# Patient Record
Sex: MALE | Race: OTHER | HISPANIC OR LATINO
[De-identification: names, ages, dates, MRNs, and addresses within clinical notes are randomized per-mention and may not be internally consistent; named-entity substitution may affect disease eponyms.]

---

## 2019-03-27 ENCOUNTER — RESULT REVIEW (OUTPATIENT)
Age: 54
End: 2019-03-27

## 2019-04-23 PROBLEM — Z00.00 ENCOUNTER FOR PREVENTIVE HEALTH EXAMINATION: Status: ACTIVE | Noted: 2019-04-23

## 2019-04-29 ENCOUNTER — APPOINTMENT (OUTPATIENT)
Dept: UROLOGY | Facility: CLINIC | Age: 54
End: 2019-04-29
Payer: COMMERCIAL

## 2019-04-29 VITALS
SYSTOLIC BLOOD PRESSURE: 151 MMHG | HEART RATE: 89 BPM | BODY MASS INDEX: 34.07 KG/M2 | HEIGHT: 69 IN | RESPIRATION RATE: 14 BRPM | DIASTOLIC BLOOD PRESSURE: 102 MMHG | WEIGHT: 230 LBS | OXYGEN SATURATION: 98 %

## 2019-04-29 DIAGNOSIS — R31.29 OTHER MICROSCOPIC HEMATURIA: ICD-10-CM

## 2019-04-29 DIAGNOSIS — N13.8 BENIGN PROSTATIC HYPERPLASIA WITH LOWER URINARY TRACT SYMPMS: ICD-10-CM

## 2019-04-29 DIAGNOSIS — N40.1 BENIGN PROSTATIC HYPERPLASIA WITH LOWER URINARY TRACT SYMPMS: ICD-10-CM

## 2019-04-29 DIAGNOSIS — E78.00 PURE HYPERCHOLESTEROLEMIA, UNSPECIFIED: ICD-10-CM

## 2019-04-29 DIAGNOSIS — F17.200 NICOTINE DEPENDENCE, UNSPECIFIED, UNCOMPLICATED: ICD-10-CM

## 2019-04-29 PROCEDURE — 99204 OFFICE O/P NEW MOD 45 MIN: CPT | Mod: 25

## 2019-04-29 PROCEDURE — 51798 US URINE CAPACITY MEASURE: CPT

## 2019-04-29 RX ORDER — ATORVASTATIN CALCIUM 40 MG/1
40 TABLET, FILM COATED ORAL
Refills: 0 | Status: ACTIVE | COMMUNITY

## 2019-04-29 NOTE — PHYSICAL EXAM
[General Appearance - Well Developed] : well developed [Normal Appearance] : normal appearance [General Appearance - In No Acute Distress] : no acute distress [FreeTextEntry1] : normal peripheral circulation  [] : no respiratory distress [Abdomen Soft] : soft [Abdomen Tenderness] : non-tender [Abdomen Mass (___ Cm)] : no abdominal mass palpated [Costovertebral Angle Tenderness] : no ~M costovertebral angle tenderness [Urethral Meatus] : meatus normal [Penis Abnormality] : normal uncircumcised penis [Scrotum] : the scrotum was normal [Epididymis] : the epididymides were normal [Testes Tenderness] : no tenderness of the testes [Testes Mass (___cm)] : there were no testicular masses [Prostate Tenderness] : the prostate was not tender [No Prostate Nodules] : no prostate nodules [Prostate Size ___ gm] : prostate size [unfilled] gm [Normal Station and Gait] : the gait and station were normal for the patient's age [Skin Color & Pigmentation] : normal skin color and pigmentation [No Focal Deficits] : no focal deficits [Oriented To Time, Place, And Person] : oriented to person, place, and time [No Palpable Adenopathy] : no palpable adenopathy

## 2019-04-29 NOTE — REVIEW OF SYSTEMS
[both] : pain during and after intercourse [denies] : denies pain with orgasm [base] : pain in base of penis [Blood in urine that you can see] : blood visible in urine [Told you have blood in urine on a urine test] : told blood was present in a urine test [Negative] : Heme/Lymph

## 2019-04-29 NOTE — LETTER BODY
[Dear  ___] : Dear  [unfilled], [Consult Letter:] : I had the pleasure of evaluating your patient, [unfilled]. [( Thank you for referring [unfilled] for consultation for _____ )] : Thank you for referring [unfilled] for consultation for [unfilled] [Please see my note below.] : Please see my note below. [Consult Closing:] : Thank you very much for allowing me to participate in the care of this patient.  If you have any questions, please do not hesitate to contact me. [Sincerely,] : Sincerely, [FreeTextEntry3] : Victoriano Lara MD\par  of Urology\par [facility]\par \par Offices:\par [facility] @\par [address], [place] [address]\par and\par [address], [place] [address], [address]\par and\par [address] [address]\par \par TEL: [phone]\par FAX: [phone]

## 2019-04-29 NOTE — HISTORY OF PRESENT ILLNESS
[FreeTextEntry1] : 52 yo male presents for Microhematuria. \par Recently had urine test and was told has microscopic hematuria. \par Denies gross hematuria, no history of kidney stones or recurrent urinary tract infections. \par Smoker- 0.5- 1 PPD for 15 years. \par Occupational exposure- no. \par Reports reasonable stream, urinates every few hours or so during the day. No nocturia.\par Denies hesitancy, straining, intermittency, urgency, incontinence, sense of incomplete emptying.\par Denies dysuria, lower abdominal or flank pain, fever, chills or rigors.\par Normal erections and libido. \par No family history of Prostate cancer. \par \par

## 2019-04-29 NOTE — ASSESSMENT
[FreeTextEntry1] : Reviewed outside records.\par \par \par Benign Prostatic Hyperplasia:\par No bothersome lower urinary tract symptoms and minimal post void residual. \par \par Microhematuria:\par Discussed the differential diagnosis of hematuria including benign and malignant pathology- including but not limited to nephrolithiasis, bladder stone, urinary tract infection, glomerular disease, renal cancer, bladder cancer, prostate cancer. We also discussed the chance that workup will not reveal a source for the bleeding. The patient understands that the hematuria could be from an upper tract (kidney or ureter) or lower tract (bladder, urethra, prostate) and that workup includes imaging and direct visualization of all of these.\par \par Will proceed with work up with Urinalysis with microscopy, Urine culture, Urine cytology, CT Urogram and Cystoscopy. \par \par Return to office after CT scan.

## 2019-04-30 LAB
APPEARANCE: CLEAR
BACTERIA: NEGATIVE
BILIRUBIN URINE: NEGATIVE
BLOOD URINE: NEGATIVE
COLOR: NORMAL
GLUCOSE QUALITATIVE U: NEGATIVE
HYALINE CASTS: 0 /LPF
KETONES URINE: NEGATIVE
LEUKOCYTE ESTERASE URINE: NEGATIVE
MICROSCOPIC-UA: NORMAL
NITRITE URINE: NEGATIVE
PH URINE: 6.5
PROTEIN URINE: NEGATIVE
RED BLOOD CELLS URINE: 3 /HPF
SPECIFIC GRAVITY URINE: 1.01
SQUAMOUS EPITHELIAL CELLS: 0 /HPF
UROBILINOGEN URINE: NORMAL
WHITE BLOOD CELLS URINE: 0 /HPF

## 2019-05-01 LAB
BACTERIA UR CULT: NORMAL
URINE CYTOLOGY: NORMAL

## 2020-10-30 PROBLEM — Z00.00 ENCOUNTER FOR PREVENTIVE HEALTH EXAMINATION: Noted: 2020-10-30

## 2021-01-07 ENCOUNTER — NON-APPOINTMENT (OUTPATIENT)
Age: 56
End: 2021-01-07

## 2021-01-07 ENCOUNTER — APPOINTMENT (OUTPATIENT)
Dept: ELECTROPHYSIOLOGY | Facility: CLINIC | Age: 56
End: 2021-01-07
Payer: COMMERCIAL

## 2021-01-07 VITALS
BODY MASS INDEX: 34.8 KG/M2 | HEART RATE: 106 BPM | SYSTOLIC BLOOD PRESSURE: 146 MMHG | DIASTOLIC BLOOD PRESSURE: 78 MMHG | OXYGEN SATURATION: 97 % | HEIGHT: 69 IN | WEIGHT: 235 LBS | TEMPERATURE: 97.1 F

## 2021-01-07 DIAGNOSIS — I47.2 VENTRICULAR TACHYCARDIA: ICD-10-CM

## 2021-01-07 PROCEDURE — 99072 ADDL SUPL MATRL&STAF TM PHE: CPT

## 2021-01-07 PROCEDURE — 93000 ELECTROCARDIOGRAM COMPLETE: CPT

## 2021-01-07 PROCEDURE — 99244 OFF/OP CNSLTJ NEW/EST MOD 40: CPT

## 2021-01-07 RX ORDER — METFORMIN HYDROCHLORIDE 500 MG/1
500 TABLET, COATED ORAL
Qty: 60 | Refills: 0 | Status: ACTIVE | COMMUNITY

## 2021-01-07 NOTE — REVIEW OF SYSTEMS
[Dizziness] : no dizziness [Convulsions] : no convulsions [Confusion] : no confusion was observed [Anxiety] : no anxiety [Easy Bleeding] : no tendency for easy bleeding [Easy Bruising] : no tendency for easy bruising [Negative] : Integumentary

## 2021-01-07 NOTE — DISCUSSION/SUMMARY
[FreeTextEntry1] : 55 year old gentleman with history of DM, HLD, obesity presenting for evaluation of NSVT. He has been generally asymptomatic, and noted to have sinus tachycardia, and brief NSVT during Holter monitoring. \par In the absence of structural heart disease or CAD, would treat the asymptomatic NSVT conservatively, but beta blockade may be useful for arrhythmia suppression, particularly in light of his sinus tachycardia. \par Will need to follow-up TTE and stress test (from [facility]) to confirm absence of CAD/structural heart disease. \par In addition, we discussed the importance of treatment of sleep apnea, which is likely present. He will need follow-up for his recent sleep study. \par -start Toprol 50gm qd, adjust as tolerated. \par -f/u TTE, stress test from [facility].\par -f/u sleep study\par -further cardiology follow-up with Dr. Guthrie, and EP follow-up as needed. \par

## 2021-01-07 NOTE — PHYSICAL EXAM
[General Appearance - Well Developed] : well developed [Well Groomed] : well groomed [General Appearance - Well Nourished] : well nourished [General Appearance - In No Acute Distress] : no acute distress [FreeTextEntry1] : overweight [Normal Conjunctiva] : the conjunctiva exhibited no abnormalities [Normal Oral Mucosa] : normal oral mucosa [Normal Jugular Venous V Waves Present] : normal jugular venous V waves present [Heart Rate And Rhythm] : heart rate and rhythm were normal [Heart Sounds] : normal S1 and S2 [Murmurs] : no murmurs present [Edema] : no peripheral edema present [] : no respiratory distress [Respiration, Rhythm And Depth] : normal respiratory rhythm and effort [Auscultation Breath Sounds / Voice Sounds] : lungs were clear to auscultation bilaterally [Abdomen Soft] : soft [Abdomen Tenderness] : non-tender [Abnormal Walk] : normal gait [Nail Clubbing] : no clubbing of the fingernails [Cyanosis, Localized] : no localized cyanosis [Oriented To Time, Place, And Person] : oriented to person, place, and time [No Anxiety] : not feeling anxious

## 2021-01-07 NOTE — HISTORY OF PRESENT ILLNESS
[FreeTextEntry1] : 55 year old gentleman with history of DM, HLD, obesity presenting for evaluation of NSVT. \par He has been noted to have sinus tachycardia on evaluation but has denied palpitation. He feels generally well and also denies lightheadedness, chest pain, syncope or dyspnea.\par Holter monitor performed 10/13/20 revealed sinus rhythm with average HR 93 bpm (range  bpm) with rare ventricular ectopy, and 1 run of NSVT lasting 4 beats at 171 bpm. \par A TTE was performed, but results are not currently available. \par He does report episodes of apnea and snoring at night, he recently had a sleep study but does not know the results. \par ECG reveals sinus tachycardia at 107 bpm, with normal conduction intervals\par \par Current meds include metformin, Chantix, Flomax\par

## 2021-05-12 ENCOUNTER — TRANSCRIPTION ENCOUNTER (OUTPATIENT)
Age: 56
End: 2021-05-12

## 2021-06-03 ENCOUNTER — TRANSCRIPTION ENCOUNTER (OUTPATIENT)
Age: 56
End: 2021-06-03

## 2021-09-21 DIAGNOSIS — Z01.818 ENCOUNTER FOR OTHER PREPROCEDURAL EXAMINATION: ICD-10-CM

## 2021-09-21 PROBLEM — Z00.00 ENCOUNTER FOR PREVENTIVE HEALTH EXAMINATION: Noted: 2021-09-21

## 2021-09-24 ENCOUNTER — APPOINTMENT (OUTPATIENT)
Dept: DISASTER EMERGENCY | Facility: CLINIC | Age: 56
End: 2021-09-24

## 2021-09-26 ENCOUNTER — TRANSCRIPTION ENCOUNTER (OUTPATIENT)
Age: 56
End: 2021-09-26

## 2021-09-26 LAB — SARS-COV-2 N GENE NPH QL NAA+PROBE: NOT DETECTED

## 2021-09-27 ENCOUNTER — OUTPATIENT (OUTPATIENT)
Dept: OUTPATIENT SERVICES | Facility: HOSPITAL | Age: 56
LOS: 1 days | End: 2021-09-27
Payer: COMMERCIAL

## 2021-09-27 DIAGNOSIS — M54.16 RADICULOPATHY, LUMBAR REGION: ICD-10-CM

## 2021-09-27 LAB — GLUCOSE BLDC GLUCOMTR-MCNC: 194 MG/DL — HIGH (ref 70–99)

## 2021-09-27 PROCEDURE — 64484 NJX AA&/STRD TFRM EPI L/S EA: CPT

## 2021-09-27 PROCEDURE — 76000 FLUOROSCOPY <1 HR PHYS/QHP: CPT

## 2021-09-27 PROCEDURE — 82962 GLUCOSE BLOOD TEST: CPT

## 2021-09-27 PROCEDURE — 64483 NJX AA&/STRD TFRM EPI L/S 1: CPT | Mod: LT

## 2021-10-22 ENCOUNTER — APPOINTMENT (OUTPATIENT)
Dept: DISASTER EMERGENCY | Facility: CLINIC | Age: 56
End: 2021-10-22

## 2021-10-23 LAB — SARS-COV-2 N GENE NPH QL NAA+PROBE: NOT DETECTED

## 2021-10-24 ENCOUNTER — TRANSCRIPTION ENCOUNTER (OUTPATIENT)
Age: 56
End: 2021-10-24

## 2021-10-25 ENCOUNTER — OUTPATIENT (OUTPATIENT)
Dept: OUTPATIENT SERVICES | Facility: HOSPITAL | Age: 56
LOS: 1 days | End: 2021-10-25
Payer: MEDICAID

## 2021-10-25 DIAGNOSIS — M54.16 RADICULOPATHY, LUMBAR REGION: ICD-10-CM

## 2021-10-25 LAB — GLUCOSE BLDC GLUCOMTR-MCNC: 187 MG/DL — HIGH (ref 70–99)

## 2021-10-25 PROCEDURE — 82962 GLUCOSE BLOOD TEST: CPT

## 2021-10-25 PROCEDURE — 76000 FLUOROSCOPY <1 HR PHYS/QHP: CPT

## 2021-10-25 PROCEDURE — 64484 NJX AA&/STRD TFRM EPI L/S EA: CPT

## 2021-10-25 PROCEDURE — 64483 NJX AA&/STRD TFRM EPI L/S 1: CPT | Mod: LT

## 2022-12-06 ENCOUNTER — APPOINTMENT (OUTPATIENT)
Dept: NEUROLOGY | Facility: CLINIC | Age: 57
End: 2022-12-06

## 2022-12-06 VITALS
WEIGHT: 235 LBS | HEIGHT: 69 IN | DIASTOLIC BLOOD PRESSURE: 98 MMHG | SYSTOLIC BLOOD PRESSURE: 150 MMHG | BODY MASS INDEX: 34.8 KG/M2

## 2022-12-06 DIAGNOSIS — Z86.39 PERSONAL HISTORY OF OTHER ENDOCRINE, NUTRITIONAL AND METABOLIC DISEASE: ICD-10-CM

## 2022-12-06 DIAGNOSIS — Z86.79 PERSONAL HISTORY OF OTHER DISEASES OF THE CIRCULATORY SYSTEM: ICD-10-CM

## 2022-12-06 DIAGNOSIS — E11.9 TYPE 2 DIABETES MELLITUS W/OUT COMPLICATIONS: ICD-10-CM

## 2022-12-06 PROCEDURE — 99204 OFFICE O/P NEW MOD 45 MIN: CPT

## 2022-12-06 RX ORDER — EMPAGLIFLOZIN 25 MG/1
25 TABLET, FILM COATED ORAL
Qty: 90 | Refills: 0 | Status: ACTIVE | COMMUNITY
Start: 2022-09-19

## 2022-12-06 RX ORDER — METFORMIN HYDROCHLORIDE 500 MG/1
500 TABLET, COATED ORAL
Refills: 0 | Status: COMPLETED | COMMUNITY
End: 2022-12-06

## 2022-12-06 RX ORDER — TAMSULOSIN HYDROCHLORIDE 0.4 MG/1
0.4 CAPSULE ORAL
Qty: 90 | Refills: 0 | Status: ACTIVE | COMMUNITY
Start: 2022-09-19

## 2022-12-06 RX ORDER — LOSARTAN POTASSIUM 25 MG/1
25 TABLET, FILM COATED ORAL
Qty: 90 | Refills: 0 | Status: ACTIVE | COMMUNITY
Start: 2022-09-19

## 2022-12-06 NOTE — PHYSICAL EXAM
[General Appearance - Alert] : alert [General Appearance - In No Acute Distress] : in no acute distress [Oriented To Time, Place, And Person] : oriented to person, place, and time [Affect] : the affect was normal [Memory Recent] : recent memory was not impaired [Memory Remote] : remote memory was not impaired [Cranial Nerves Optic (II)] : visual acuity intact bilaterally,  visual fields full to confrontation, pupils equal round and reactive to light [Cranial Nerves Oculomotor (III)] : extraocular motion intact [Cranial Nerves Trigeminal (V)] : facial sensation intact symmetrically [Cranial Nerves Facial (VII)] : face symmetrical [Cranial Nerves Vestibulocochlear (VIII)] : hearing was intact bilaterally [Cranial Nerves Glossopharyngeal (IX)] : tongue and palate midline [Cranial Nerves Accessory (XI - Cranial And Spinal)] : head turning and shoulder shrug symmetric [Cranial Nerves Hypoglossal (XII)] : there was no tongue deviation with protrusion [Sensation Vibration Decrease] : vibration was intact [Abnormal Walk] : normal gait [2+] : Ankle jerk left 2+ [Optic Disc Abnormality] : the optic disc were normal in size and color [Dysarthria] : no dysarthria [Aphasia] : no dysphasia/aphasia [Romberg's Sign] : Romberg's sign was negtive [Coordination - Dysmetria Impaired Finger-to-Nose Bilateral] : not present [Plantar Reflex Right Only] : normal on the right [Plantar Reflex Left Only] : normal on the left [FreeTextEntry6] : There is decreased tone in the right arm throughout.

## 2022-12-06 NOTE — CONSULT LETTER
[Dear  ___] : Dear  [unfilled], [Courtesy Letter:] : I had the pleasure of seeing your patient, [unfilled], in my office today. [Please see my note below.] : Please see my note below. [Consult Closing:] : Thank you very much for allowing me to participate in the care of this patient.  If you have any questions, please do not hesitate to contact me. [Sincerely,] : Sincerely, [DrAnna  ___] : Dr. STEVENS [FreeTextEntry3] : Luis Miller MD.

## 2022-12-06 NOTE — DATA REVIEWED
[de-identified] : X-ray of the right shoulder was performed on 11/28/2022 at [facility]\par The study demonstrated a fracture of the right greater tuberosity.

## 2022-12-06 NOTE — HISTORY OF PRESENT ILLNESS
[FreeTextEntry1] : I saw this patient in the office today.\par \par On 11/22/2022 he fell and injured his right shoulder.\par He went to [facility] where he was told it was dislocated and had a manipulative procedure.\par The pain increased and he ultimately went to the emergency room at [facility].\par X-ray demonstrated fracture and he was referred to an orthopedist.\par He reports that he has had inability to move the right arm since the fall.\par His orthopedist referred him here for evaluation for the nerve damage prior to any surgery.

## 2022-12-06 NOTE — ASSESSMENT
[FreeTextEntry1] : This is a 56 year-old man who sustained an injury to the right shoulder.\par He has had essentially no movement in the right arm since the injury.\par \par Findings to suggest injury to the brachial plexus.\par I will obtain EMG and nerve conduction studies to assess the right upper extremity further.\par If he requires surgery, I would recommend that it be performed without waiting for the results of the EMG.\par \par He will need aggressive physical therapy after the surgery.\par \par Given the degree of weakness, the overall prognosis for motor recovery is guarded.\par \par I will see him back after the EMG testing.

## 2023-01-09 ENCOUNTER — APPOINTMENT (OUTPATIENT)
Dept: NEUROLOGY | Facility: CLINIC | Age: 58
End: 2023-01-09
Payer: MEDICAID

## 2023-01-09 PROCEDURE — 95886 MUSC TEST DONE W/N TEST COMP: CPT

## 2023-01-09 PROCEDURE — 95909 NRV CNDJ TST 5-6 STUDIES: CPT

## 2023-01-10 ENCOUNTER — NON-APPOINTMENT (OUTPATIENT)
Age: 58
End: 2023-01-10

## 2023-02-15 ENCOUNTER — APPOINTMENT (OUTPATIENT)
Dept: NEUROSURGERY | Facility: CLINIC | Age: 58
End: 2023-02-15
Payer: MEDICAID

## 2023-02-15 VITALS
OXYGEN SATURATION: 97 % | WEIGHT: 230 LBS | BODY MASS INDEX: 34.07 KG/M2 | TEMPERATURE: 98.6 F | HEIGHT: 69 IN | SYSTOLIC BLOOD PRESSURE: 149 MMHG | DIASTOLIC BLOOD PRESSURE: 84 MMHG | HEART RATE: 119 BPM

## 2023-02-15 DIAGNOSIS — G54.0 BRACHIAL PLEXUS DISORDERS: ICD-10-CM

## 2023-02-15 PROCEDURE — 99204 OFFICE O/P NEW MOD 45 MIN: CPT

## 2023-02-16 PROBLEM — G54.0 BRACHIAL PLEXOPATHY: Status: ACTIVE | Noted: 2022-12-06

## 2023-02-16 RX ORDER — GABAPENTIN 300 MG/1
300 CAPSULE ORAL 3 TIMES DAILY
Qty: 90 | Refills: 2 | Status: ACTIVE | COMMUNITY
Start: 2023-02-16 | End: 1900-01-01

## 2023-02-16 NOTE — DATA REVIEWED
[de-identified] : EMG: widespread denervation of the right arm consistent with a diffuse brachial plexopathy

## 2023-02-16 NOTE — HISTORY OF PRESENT ILLNESS
[< 3 months] : less than 3 months [FreeTextEntry1] : right arm pain and swelling [de-identified] : JAVIER SIMS is a 57 year old male who presents for neurosurgical evaluation of right arm pain and swelling. He presents with his right arm in a sling. On 11/22/2022 he fell and injured his right shoulder. He went to [facility] where he was told it was dislocated and had a manipulative procedure. The pain increased and he went to the emergency room at [facility]. X-ray demonstrated fracture and he was referred to an orthopedist. His orthopedist referred him to Neurology for the nerve damage prior to any surgery. He reports that he has had inability to move the right arm since the fall, he has done PT with minimal recovery of movement to right arm. Right hand started swelling this past week. He is in constant pain and is having difficulty sleeping. He is not taking any medications for pain. \par

## 2023-02-16 NOTE — ASSESSMENT
[FreeTextEntry1] : Patient with above history and no imaging brought to office. EMG showed widespread denervation of the right arm consistent with a diffuse brachial plexopathy. Referred to Dr. Krishnamurthy for consultation. Gabapentin sent to pharmacy, advised to follow up with pain management. \par \par \par Plan:\par f/u with specialist\par f/u PRN\par Patient knows to call the office if there are any new or worsening symptoms. \par All questions and concerns answered and addressed in detail to patient's complete satisfaction. Patient verbalized understanding and agreed to plan.\par \par

## 2023-02-16 NOTE — PHYSICAL EXAM
[General Appearance - Alert] : alert [Oriented To Time, Place, And Person] : oriented to person, place, and time [Impaired Insight] : insight and judgment were intact [Affect] : the affect was normal [No Visual Abnormalities] : no visible abnormailities [No Tenderness to Palpation] : no spine tenderness on palpation [Normal] : normal [Sclera] : the sclera and conjunctiva were normal [Neck Appearance] : the appearance of the neck was normal [] : no respiratory distress [Involuntary Movements] : no involuntary movements were seen [FreeTextEntry6] : R [FreeTextEntry7] : decreased light touch and pin sensation through right upper extremity  [FreeTextEntry1] : decreased strength right arm 2/5, pitting edema right hand, arm in sling

## 2023-05-09 ENCOUNTER — APPOINTMENT (OUTPATIENT)
Dept: NEUROLOGY | Facility: CLINIC | Age: 58
End: 2023-05-09

## 2024-02-11 ENCOUNTER — NON-APPOINTMENT (OUTPATIENT)
Age: 59
End: 2024-02-11

## 2024-02-12 ENCOUNTER — APPOINTMENT (OUTPATIENT)
Dept: OTOLARYNGOLOGY | Facility: CLINIC | Age: 59
End: 2024-02-12
Payer: MEDICAID

## 2024-02-12 VITALS
OXYGEN SATURATION: 96 % | BODY MASS INDEX: 31.4 KG/M2 | HEART RATE: 108 BPM | HEIGHT: 69 IN | SYSTOLIC BLOOD PRESSURE: 107 MMHG | WEIGHT: 212 LBS | RESPIRATION RATE: 16 BRPM | DIASTOLIC BLOOD PRESSURE: 74 MMHG

## 2024-02-12 DIAGNOSIS — Z87.898 PERSONAL HISTORY OF OTHER SPECIFIED CONDITIONS: ICD-10-CM

## 2024-02-12 DIAGNOSIS — Z86.718 PERSONAL HISTORY OF OTHER VENOUS THROMBOSIS AND EMBOLISM: ICD-10-CM

## 2024-02-12 PROCEDURE — 31231 NASAL ENDOSCOPY DX: CPT | Mod: 52

## 2024-02-12 PROCEDURE — 99204 OFFICE O/P NEW MOD 45 MIN: CPT | Mod: 25

## 2024-02-12 RX ORDER — POTASSIUM CHLORIDE 10 MEQ
10 CAPSULE, EXTENDED RELEASE ORAL
Refills: 0 | Status: ACTIVE | COMMUNITY

## 2024-02-12 RX ORDER — METOPROLOL SUCCINATE 50 MG/1
50 TABLET, EXTENDED RELEASE ORAL DAILY
Qty: 30 | Refills: 2 | Status: COMPLETED | COMMUNITY
Start: 2021-01-07 | End: 2024-02-12

## 2024-02-12 RX ORDER — HYDROCHLOROTHIAZIDE 12.5 MG/1
12.5 TABLET ORAL
Refills: 0 | Status: COMPLETED | COMMUNITY
End: 2024-02-12

## 2024-02-12 RX ORDER — FUROSEMIDE 40 MG/1
40 TABLET ORAL
Refills: 0 | Status: ACTIVE | COMMUNITY

## 2024-02-14 ENCOUNTER — NON-APPOINTMENT (OUTPATIENT)
Age: 59
End: 2024-02-14

## 2024-02-15 ENCOUNTER — OUTPATIENT (OUTPATIENT)
Dept: OUTPATIENT SERVICES | Facility: HOSPITAL | Age: 59
LOS: 1 days | Discharge: HOME | End: 2024-02-15
Payer: MEDICAID

## 2024-02-15 ENCOUNTER — OUTPATIENT (OUTPATIENT)
Dept: OUTPATIENT SERVICES | Facility: HOSPITAL | Age: 59
LOS: 1 days | Discharge: HOME | End: 2024-02-15

## 2024-02-15 DIAGNOSIS — C11.9 MALIGNANT NEOPLASM OF NASOPHARYNX, UNSPECIFIED: ICD-10-CM

## 2024-02-16 ENCOUNTER — RESULT REVIEW (OUTPATIENT)
Age: 59
End: 2024-02-16

## 2024-02-16 ENCOUNTER — APPOINTMENT (OUTPATIENT)
Dept: HEMATOLOGY ONCOLOGY | Facility: CLINIC | Age: 59
End: 2024-02-16
Payer: MEDICAID

## 2024-02-16 VITALS
OXYGEN SATURATION: 97 % | HEART RATE: 113 BPM | HEIGHT: 69 IN | DIASTOLIC BLOOD PRESSURE: 73 MMHG | BODY MASS INDEX: 31.25 KG/M2 | SYSTOLIC BLOOD PRESSURE: 109 MMHG | WEIGHT: 211.01 LBS

## 2024-02-16 LAB
BASOPHILS # BLD AUTO: 0 K/UL — SIGNIFICANT CHANGE UP (ref 0–0.2)
BASOPHILS NFR BLD AUTO: 0.6 % — SIGNIFICANT CHANGE UP (ref 0–2)
EOSINOPHIL # BLD AUTO: 0.1 K/UL — SIGNIFICANT CHANGE UP (ref 0–0.5)
EOSINOPHIL NFR BLD AUTO: 1.1 % — SIGNIFICANT CHANGE UP (ref 0–6)
HCT VFR BLD CALC: 41.8 % — SIGNIFICANT CHANGE UP (ref 39–50)
HGB BLD-MCNC: 13.6 G/DL — SIGNIFICANT CHANGE UP (ref 13–17)
LYMPHOCYTES # BLD AUTO: 0.8 K/UL — LOW (ref 1–3.3)
LYMPHOCYTES # BLD AUTO: 15.7 % — SIGNIFICANT CHANGE UP (ref 13–44)
MCHC RBC-ENTMCNC: 28.5 PG — SIGNIFICANT CHANGE UP (ref 27–34)
MCHC RBC-ENTMCNC: 32.6 G/DL — SIGNIFICANT CHANGE UP (ref 32–36)
MCV RBC AUTO: 87.6 FL — SIGNIFICANT CHANGE UP (ref 80–100)
MONOCYTES # BLD AUTO: 0.4 K/UL — SIGNIFICANT CHANGE UP (ref 0–0.9)
MONOCYTES NFR BLD AUTO: 6.7 % — SIGNIFICANT CHANGE UP (ref 2–14)
NEUTROPHILS # BLD AUTO: 4.1 K/UL — SIGNIFICANT CHANGE UP (ref 1.8–7.4)
NEUTROPHILS NFR BLD AUTO: 76 % — SIGNIFICANT CHANGE UP (ref 43–77)
PLATELET # BLD AUTO: 206 K/UL — SIGNIFICANT CHANGE UP (ref 150–400)
RBC # BLD: 4.77 M/UL — SIGNIFICANT CHANGE UP (ref 4.2–5.8)
RBC # FLD: 15.1 % — HIGH (ref 10.3–14.5)
WBC # BLD: 5.4 K/UL — SIGNIFICANT CHANGE UP (ref 3.8–10.5)
WBC # FLD AUTO: 5.4 K/UL — SIGNIFICANT CHANGE UP (ref 3.8–10.5)

## 2024-02-16 PROCEDURE — 99205 OFFICE O/P NEW HI 60 MIN: CPT

## 2024-02-16 NOTE — PHYSICAL EXAM
[Fully active, able to carry on all pre-disease performance without restriction] : Status 0 - Fully active, able to carry on all pre-disease performance without restriction [Normal] : affect appropriate [de-identified] : cervical lymphadenopathy

## 2024-02-16 NOTE — REVIEW OF SYSTEMS
[Patient Intake Form Reviewed] : Patient intake form was reviewed [Fever] : no fever [Recent Change In Weight] : ~T recent weight change [Eye Pain] : no eye pain [Vision Problems] : no vision problems [Dysphagia] : no dysphagia [Loss of Hearing] : no loss of hearing [Hoarseness] : no hoarseness [Odynophagia] : no odynophagia [Chest Pain] : no chest pain [Lower Ext Edema] : no lower extremity edema [Shortness Of Breath] : no shortness of breath [Cough] : no cough [SOB on Exertion] : no shortness of breath during exertion [Abdominal Pain] : no abdominal pain [Vomiting] : no vomiting [Constipation] : no constipation [Dysuria] : no dysuria [Joint Pain] : no joint pain [Skin Rash] : no skin rash [Easy Bleeding] : no tendency for easy bleeding [Easy Bruising] : no tendency for easy bruising [Swollen Glands] : no swollen glands

## 2024-02-16 NOTE — HISTORY OF PRESENT ILLNESS
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  He sees ENT, Dr. Ibanez for chronic nasopharyngitis. CT on 12/21/23 at  showed soft tissue thickening in nasopharynx and cervical lymph nodes. Pt had a left neck level II FNA on 1/5/24 which showed metastatic nasopharyngeal carcinoma.  MRI at  on 1/30/24 showed metastatic adenopathy involving jugular chains bilaterally with bulky L palatine tonsil.  He saw Dr. Avila Diaz on 2/12/24 and is scheduled to see Dr. Anisha roberts week.  He is scheduled for PET scan on 02/26/24.  He feels ok and is eating a normal diet currently.

## 2024-02-16 NOTE — ASSESSMENT
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  It is EBV positive on path report. Discussed treatment of localized nasopharyngeal carcinoma with likely induction chemo therapy followed by chemoRT. Will await PET scan to further characterize and stage and he will follow up for final treatment recs.  If distant mets, would discss chemo-immunotherapy. WIll follow up on 02/29/24 to discuss the results.

## 2024-02-20 ENCOUNTER — APPOINTMENT (OUTPATIENT)
Dept: RADIATION ONCOLOGY | Facility: CLINIC | Age: 59
End: 2024-02-20
Payer: MEDICAID

## 2024-02-20 ENCOUNTER — RESULT REVIEW (OUTPATIENT)
Age: 59
End: 2024-02-20

## 2024-02-20 ENCOUNTER — NON-APPOINTMENT (OUTPATIENT)
Age: 59
End: 2024-02-20

## 2024-02-20 VITALS
WEIGHT: 212 LBS | HEART RATE: 105 BPM | RESPIRATION RATE: 16 BRPM | DIASTOLIC BLOOD PRESSURE: 87 MMHG | BODY MASS INDEX: 31.4 KG/M2 | OXYGEN SATURATION: 96 % | HEIGHT: 69 IN | SYSTOLIC BLOOD PRESSURE: 138 MMHG

## 2024-02-20 DIAGNOSIS — Z63.5 DISRUPTION OF FAMILY BY SEPARATION AND DIVORCE: ICD-10-CM

## 2024-02-20 PROCEDURE — 99205 OFFICE O/P NEW HI 60 MIN: CPT

## 2024-02-20 PROCEDURE — T1013A: CUSTOM

## 2024-02-20 SDOH — SOCIAL STABILITY - SOCIAL INSECURITY: DISRUPTION OF FAMILY BY SEPARATION AND DIVORCE: Z63.5

## 2024-02-20 NOTE — REASON FOR VISIT
[Time Spent: ____ minutes] : Total time spent using  services: [unfilled] minutes. The patient's primary language is not English thus required  services. [Interpreters_IDNumber] : [id number] [Interpreters_FullName] : Alok [TWNoteComboBox1] : Fijian

## 2024-02-20 NOTE — HISTORY OF PRESENT ILLNESS
[FreeTextEntry1] : 58-year-old gentleman presents today for consultation regarding radiation therapy for nasopharyngeal carcinoma.  His past medical history is significant for cigarette tobacco use, half pack for 15 years.  Former drinker, quit several months ago.  He presented to Dr. Ibanez for evaluation of chronic nasopharyngitis.    12/21/23 CT neck showed soft tissue thickening within the nasopharynx measuring up to 1.6 cm. Pathologically enlarged left greater than right cervical lymph nodes, measuring up to 3.9 cm.  1/5/24 needle biopsy left cervical level 2 lymph node showed metastatic nonkeratinizing nasopharyngeal carcinoma, undifferentiated.   1/30/24 MRI face orbits showed metastatic adenopathy involving the jugular chains bilaterally with a bulky left palatine tonsil with no deep infiltrating mass.   He met with Dr. Avila Diaz on 2/12/24 and Dr. Andrew Frey on 2/16/24.  PET/CT is scheduled for 2/26/24.  He reports noting the left neck mass approximately 2 years ago.  Nontender, without other symptoms or bother.  He reports no significant nasal discharge, change in sense of smell or taste.  No otalgia.  No unintentional weight loss.   He has a history of DVT, and is on Eliquis. 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  He is alone in this [place].

## 2024-02-20 NOTE — PHYSICAL EXAM
[Normal] : oriented to person, place and time, the affect was normal, the mood was normal and not anxious [Sclera] : the sclera and conjunctiva were normal [Extraocular Movements] : extraocular movements were intact [Outer Ear] : the ears and nose were normal in appearance [No Spine Tenderness] : no tenderness to palpation of the vertebral spine [Skin Color & Pigmentation] : normal skin color and pigmentation [de-identified] : 4 cm left level II adenopathy; smaller adenopathy right level II [de-identified] : decreased grasp strength right hand 3rd-5th digits

## 2024-02-20 NOTE — REVIEW OF SYSTEMS
[Negative] : Allergic/Immunologic [Dysphagia: Grade 0] : Dysphagia: Grade 0 [Edema Limbs: Grade 0] : Edema Limbs: Grade 0  [Fatigue: Grade 0] : Fatigue: Grade 0 [Localized Edema: Grade 2 - Moderate localized edema and intervention indicated; limiting instrumental ADL] : Localized Edema: Grade 2 - Moderate localized edema and intervention indicated; limiting instrumental ADL [Neck Edema: Grade 2 - Moderate neck edema; slight obliteration of anatomic landmarks; limiting instrumental ADL] : Neck Edema: Grade 2 - Moderate neck edema; slight obliteration of anatomic landmarks; limiting instrumental ADL [Xerostomia: Grade 0] : Xerostomia: Grade 0 [Dysgeusia: Grade 0] : Dysgeusia: Grade 0

## 2024-02-20 NOTE — END OF VISIT
History of present illness:  89-year-old female with a history of osteoarthritis, especially of the knees.  She also has chondrocalcinosis.  She had previously been followed by Dr. Eng.  I saw her for the 1st time 1 year ago.  She was complaining of pain in both knees but did not feel she was ready for an injection.  She was using Voltaren gel and I told her to use it more frequently.  She also had been taking Tylenol.  She comes in now because of increased aching in her knees for the past week.  It is worse on the right than on the left.  She has noticed some swelling in the knee.  There is no erythema or increased warmth accompanying the swelling.  The pain is worse at the end of the day.  It occasionally wakes her up at night.  She denies any increase morning pain or morning stiffness.  The pain does not radiate down the leg.  It does interfere with activity.  She has been using a walker when she is outside the house and a cane at home.  She has had some pain in the shoulder but no other peripheral joint complaints.  She has had no other new medical problems.  Her diabetes has been stable.    Physical examination:  Musculoskeletal:  Shoulders have pain on range of motion but no tenderness to palpation.  Elbows and wrists are unremarkable.  She has soft tissue swelling of the right 2nd and 3rd PIP and bony hypertrophy.  There is no erythema or increased warmth.  She has marked bony hypertrophy of both knees.  She has pain on range of motion of both knees.  There is no effusion.  Ankles and feet are unremarkable.    Assessment:  She is having a flare of her osteoarthritis    Plans:  I elected to inject 1 knee at a time so she does not get too much cortisone.  I did the right knee today because it is the worst.  Depending on how she response to the injection, she may need to come in and see me next week for me to do the left knee.  She will otherwise return to see me as needed.  
[Time Spent: ___ minutes] : I have spent [unfilled] minutes of time on the encounter.

## 2024-02-20 NOTE — VITALS
[Maximal Pain Intensity: 0/10] : 0/10 [Least Pain Intensity: 0/10] : 0/10 [90: Able to carry normal activity; minor signs or symptoms of disease.] : 90: Able to carry normal activity; minor signs or symptoms of disease.  [Date: ____________] : Patient's last distress assessment performed on [unfilled]. [2 - Distress Level] : Distress Level: 2 [Patient given social work contact information and resource sheet] : Patient was given social work contact information and resource sheet

## 2024-02-22 ENCOUNTER — NON-APPOINTMENT (OUTPATIENT)
Age: 59
End: 2024-02-22

## 2024-02-23 ENCOUNTER — APPOINTMENT (OUTPATIENT)
Dept: NUCLEAR MEDICINE | Facility: CLINIC | Age: 59
End: 2024-02-23

## 2024-02-26 ENCOUNTER — APPOINTMENT (OUTPATIENT)
Dept: NUCLEAR MEDICINE | Facility: CLINIC | Age: 59
End: 2024-02-26
Payer: MEDICAID

## 2024-02-26 ENCOUNTER — OUTPATIENT (OUTPATIENT)
Dept: OUTPATIENT SERVICES | Facility: HOSPITAL | Age: 59
LOS: 1 days | End: 2024-02-26

## 2024-02-26 DIAGNOSIS — C11.9 MALIGNANT NEOPLASM OF NASOPHARYNX, UNSPECIFIED: ICD-10-CM

## 2024-02-26 PROCEDURE — 78815 PET IMAGE W/CT SKULL-THIGH: CPT | Mod: 26,PI

## 2024-02-26 RX ORDER — DIAZEPAM 5 MG/1
5 TABLET ORAL
Qty: 2 | Refills: 0 | Status: ACTIVE | COMMUNITY
Start: 2024-02-22 | End: 1900-01-01

## 2024-02-28 ENCOUNTER — NON-APPOINTMENT (OUTPATIENT)
Age: 59
End: 2024-02-28

## 2024-03-07 ENCOUNTER — APPOINTMENT (OUTPATIENT)
Dept: HEMATOLOGY ONCOLOGY | Facility: CLINIC | Age: 59
End: 2024-03-07
Payer: MEDICAID

## 2024-03-07 VITALS
SYSTOLIC BLOOD PRESSURE: 118 MMHG | HEIGHT: 69 IN | DIASTOLIC BLOOD PRESSURE: 86 MMHG | WEIGHT: 215.38 LBS | TEMPERATURE: 97.9 F | BODY MASS INDEX: 31.9 KG/M2 | OXYGEN SATURATION: 97 % | HEART RATE: 107 BPM

## 2024-03-07 PROCEDURE — 99215 OFFICE O/P EST HI 40 MIN: CPT

## 2024-03-07 RX ORDER — ONDANSETRON 8 MG/1
8 TABLET, ORALLY DISINTEGRATING ORAL EVERY 8 HOURS
Qty: 90 | Refills: 3 | Status: ACTIVE | COMMUNITY
Start: 2024-03-07 | End: 1900-01-01

## 2024-03-07 RX ORDER — OLANZAPINE 5 MG/1
5 TABLET, FILM COATED ORAL
Qty: 30 | Refills: 0 | Status: ACTIVE | COMMUNITY
Start: 2024-03-07 | End: 1900-01-01

## 2024-03-07 NOTE — ASSESSMENT
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  It is EBV positive on path report. Discussed treatment of localized nasopharyngeal carcinoma with likely induction chemo therapy followed by chemoRT. Will await PET scan to further characterize and stage and he will follow up for final treatment recs.  If distant mets, would discss chemo-immunotherapy. WIll follow up on 02/29/24 to discuss the results.   03/07/24: Mr Jenkins returns for follow up. His PET scan from 02/26/24 shows DG-avid soft tissue thickening in nasopharynx corresponds to known malignancy. Mild, symmetric hypermetabolism in the palatine tonsils is nonspecific. Further evaluation may be performed with direct visualization.  FDG-avid bilateral level II, left level III, and left retropharyngeal lymph node metastases.  FDG-avid consolidation at left lung base is indeterminate. Differential diagnosis includes infectious, inflammatory, and neoplastic etiologies. A 1 month follow-up with dedicated CT of chest is recommended for further evaluation. Increased FDG uptake within the iliopsoas anterior to the right femoral head and adjacent to the gluteus medius insertion on the right greater trochanter, likely inflammatory. -Given significant bilateral mari metastases, will plan for 3 cycles of induction chemotherapy with gemcitabine cisplatin followed by chemoRT with cisplatin. Return to initiate chemotherapy

## 2024-03-07 NOTE — REASON FOR VISIT
[Follow-Up Visit] : a follow-up [Initial Consultation] : an initial consultation [FreeTextEntry2] : nasopharyngeal carcinoma Calm/Appropriate

## 2024-03-07 NOTE — REVIEW OF SYSTEMS
[Patient Intake Form Reviewed] : Patient intake form was reviewed [Recent Change In Weight] : ~T recent weight change [Fever] : no fever [Eye Pain] : no eye pain [Vision Problems] : no vision problems [Dysphagia] : no dysphagia [Loss of Hearing] : no loss of hearing [Hoarseness] : no hoarseness [Odynophagia] : no odynophagia [Chest Pain] : no chest pain [Lower Ext Edema] : no lower extremity edema [Shortness Of Breath] : no shortness of breath [Cough] : no cough [SOB on Exertion] : no shortness of breath during exertion [Abdominal Pain] : no abdominal pain [Vomiting] : no vomiting [Constipation] : no constipation [Dysuria] : no dysuria [Joint Pain] : no joint pain [Skin Rash] : no skin rash [Easy Bleeding] : no tendency for easy bleeding [Easy Bruising] : no tendency for easy bruising [Swollen Glands] : no swollen glands

## 2024-03-07 NOTE — HISTORY OF PRESENT ILLNESS
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  He sees ENT, Dr. Ibanez for chronic nasopharyngitis. CT on 12/21/23 at  showed soft tissue thickening in nasopharynx and cervical lymph nodes. Pt had a left neck level II FNA on 1/5/24 which showed metastatic nasopharyngeal carcinoma.  MRI at  on 1/30/24 showed metastatic adenopathy involving jugular chains bilaterally with bulky L palatine tonsil.  He saw Dr. Avila Diaz on 2/12/24 and is scheduled to see Dr. Anisha roberts week.  He is scheduled for PET scan on 02/26/24.  He feels ok and is eating a normal diet currently.   [de-identified] : 03/07/24: Mr Jenkins returns for follow up. His PET scan from 02/26/24 shows DG-avid soft tissue thickening in nasopharynx corresponds to known malignancy. Mild, symmetric hypermetabolism in the palatine tonsils is nonspecific. Further evaluation may be performed with direct visualization.  FDG-avid bilateral level II, left level III, and left retropharyngeal lymph node metastases.  FDG-avid consolidation at left lung base is indeterminate. Differential diagnosis includes infectious, inflammatory, and neoplastic etiologies. A 1 month follow-up with dedicated CT of chest is recommended for further evaluation. Increased FDG uptake within the iliopsoas anterior to the right femoral head and adjacent to the gluteus medius insertion on the right greater trochanter, likely inflammatory.   5. Focus of mildly increased radiotracer activity in right molar region of the maxilla likely is related to dental disease. Please correlate clinically.

## 2024-03-08 ENCOUNTER — NON-APPOINTMENT (OUTPATIENT)
Age: 59
End: 2024-03-08

## 2024-03-08 ENCOUNTER — APPOINTMENT (OUTPATIENT)
Dept: OTOLARYNGOLOGY | Facility: CLINIC | Age: 59
End: 2024-03-08
Payer: MEDICAID

## 2024-03-08 VITALS — OXYGEN SATURATION: 99 % | HEART RATE: 114 BPM | DIASTOLIC BLOOD PRESSURE: 60 MMHG | SYSTOLIC BLOOD PRESSURE: 92 MMHG

## 2024-03-08 VITALS — TEMPERATURE: 99 F

## 2024-03-08 DIAGNOSIS — R91.1 SOLITARY PULMONARY NODULE: ICD-10-CM

## 2024-03-08 PROCEDURE — 31231 NASAL ENDOSCOPY DX: CPT

## 2024-03-08 PROCEDURE — 99214 OFFICE O/P EST MOD 30 MIN: CPT | Mod: 25

## 2024-03-08 NOTE — PHYSICAL EXAM
[Nasal Endoscopy Performed] : nasal endoscopy was performed, see procedure section for findings [de-identified] : Approx. 3 cm L. level II LN, firm, mobile, no TTP. [Normal] : no rashes [FreeTextEntry1] : No concerning lesions in the OC/OPx on inspection or palpation.

## 2024-03-08 NOTE — DATA REVIEWED
[de-identified] : PET/CT Neck independently interpreted - findings of NPC with bilateral LAD, avid left lung consolidation.

## 2024-03-08 NOTE — DATA REVIEWED
[de-identified] : MRI independently interpreted - thickening along Waldeyer's ring, specially along the NPx with bilateral pathologic LAD.

## 2024-03-08 NOTE — HISTORY OF PRESENT ILLNESS
[de-identified] : Referred by Dr. Ibanez for chronic nasopharyngitis. CT on 12/21/23 at  showed soft tissue thickening in nasopharynx and cervical lymph nodes.   Pt had Left neck Level II FNA on 1/5/24 which showed metastatic nasopharyngeal carcinoma.  MRI at  on 1/30/24 showed metastatic adenopathy involving jugular chains bilaterally with bulky L palatine tonsil.  Pt is eating well, denies pain and SOB.  PT has hx of DVT and is on Eliquis. The PCP Dr. Katie Cantor manages the Eliquis.  Pt smokes 1/2 ppd for 15 years.  Former drinker.

## 2024-03-08 NOTE — PROCEDURE
[Mass] : a mass [Flexible Endoscope] : examined with the flexible endoscope [None] : none [FreeTextEntry6] : Fullness in the NPx bilaterally consistent with primary lesion in the NPx.  No obviously concerning lesions in the OPx, HPx or larynx.  VC are mobile, airway patent. [Serial Number: ___] : Serial Number: [unfilled]

## 2024-03-08 NOTE — HISTORY OF PRESENT ILLNESS
[de-identified] : 57 y/o M presents for follow up for chronic nasopharyngitis Recently completed PET/CT FDG done 02/26/24  History of DVT, on Eliquis  History of Left neck Level II FNA on 1/5/24 which showed metastatic nasopharyngeal carcinoma.  MRI at  on 1/30/24 showed metastatic adenopathy involving jugular chains bilaterally with bulky L palatine tonsil.    Pt is eating well, denies pain and SOB.    Denies unintentional weight loss Reports anterior rhinorrhea starting last night. No fevers reported.   Pt smokes 1/2 ppd for 15 years -- reports smoking less that 1/2 ppd today. Former drinker.

## 2024-03-08 NOTE — PROCEDURE
[Flexible Endoscope] : examined with the flexible endoscope [None] : none [Serial Number: ___] : Serial Number: [unfilled] [FreeTextEntry6] : Fullness in the NPx bilaterally consistent with primary lesion in the NPx.  No lesions in the NC, no drainage. [de-identified] : NPC

## 2024-03-08 NOTE — PHYSICAL EXAM
[de-identified] : Approx. 3 cm L. level II LN, firm, mobile, no TTP. [Nasal Endoscopy Performed] : nasal endoscopy was performed, see procedure section for findings [FreeTextEntry1] : No concerning lesions in the OC/OPx on inspection or palpation. [Normal] : orientation to person, place, and time: normal

## 2024-03-18 ENCOUNTER — APPOINTMENT (OUTPATIENT)
Dept: OTOLARYNGOLOGY | Facility: CLINIC | Age: 59
End: 2024-03-18

## 2024-03-18 ENCOUNTER — NON-APPOINTMENT (OUTPATIENT)
Age: 59
End: 2024-03-18

## 2024-03-19 ENCOUNTER — NON-APPOINTMENT (OUTPATIENT)
Age: 59
End: 2024-03-19

## 2024-03-21 LAB
ALBUMIN SERPL ELPH-MCNC: 4.4 G/DL
ALP BLD-CCNC: 103 U/L
ALT SERPL-CCNC: 28 U/L
ANION GAP SERPL CALC-SCNC: 11 MMOL/L
APTT BLD: 37 SEC
AST SERPL-CCNC: 26 U/L
BILIRUB SERPL-MCNC: 0.3 MG/DL
BUN SERPL-MCNC: 9 MG/DL
CALCIUM SERPL-MCNC: 9.7 MG/DL
CHLORIDE SERPL-SCNC: 101 MMOL/L
CO2 SERPL-SCNC: 28 MMOL/L
CREAT SERPL-MCNC: 0.48 MG/DL
EGFR: 120 ML/MIN/1.73M2
GLUCOSE SERPL-MCNC: 132 MG/DL
HAV IGM SER QL: NONREACTIVE
HBV CORE IGG+IGM SER QL: NONREACTIVE
HBV CORE IGM SER QL: NONREACTIVE
HBV SURFACE AG SER QL: NONREACTIVE
HCV AB SER QL: NONREACTIVE
HCV S/CO RATIO: 0.18 S/CO
INR PPP: 1.1 RATIO
MAGNESIUM SERPL-MCNC: 1.7 MG/DL
POTASSIUM SERPL-SCNC: 4.4 MMOL/L
PROT SERPL-MCNC: 8.8 G/DL
PT BLD: 12.5 SEC
SODIUM SERPL-SCNC: 140 MMOL/L

## 2024-03-22 ENCOUNTER — RESULT REVIEW (OUTPATIENT)
Age: 59
End: 2024-03-22

## 2024-03-22 ENCOUNTER — NON-APPOINTMENT (OUTPATIENT)
Age: 59
End: 2024-03-22

## 2024-03-22 ENCOUNTER — APPOINTMENT (OUTPATIENT)
Dept: HEMATOLOGY ONCOLOGY | Facility: CLINIC | Age: 59
End: 2024-03-22

## 2024-03-22 LAB
BASOPHILS # BLD AUTO: 0 K/UL — SIGNIFICANT CHANGE UP (ref 0–0.2)
BASOPHILS NFR BLD AUTO: 0.6 % — SIGNIFICANT CHANGE UP (ref 0–2)
EOSINOPHIL # BLD AUTO: 0.1 K/UL — SIGNIFICANT CHANGE UP (ref 0–0.5)
EOSINOPHIL NFR BLD AUTO: 1.9 % — SIGNIFICANT CHANGE UP (ref 0–6)
HCT VFR BLD CALC: 40.3 % — SIGNIFICANT CHANGE UP (ref 39–50)
HGB BLD-MCNC: 13.3 G/DL — SIGNIFICANT CHANGE UP (ref 13–17)
LYMPHOCYTES # BLD AUTO: 1.4 K/UL — SIGNIFICANT CHANGE UP (ref 1–3.3)
LYMPHOCYTES # BLD AUTO: 23.6 % — SIGNIFICANT CHANGE UP (ref 13–44)
MCHC RBC-ENTMCNC: 29.1 PG — SIGNIFICANT CHANGE UP (ref 27–34)
MCHC RBC-ENTMCNC: 33.1 G/DL — SIGNIFICANT CHANGE UP (ref 32–36)
MCV RBC AUTO: 88 FL — SIGNIFICANT CHANGE UP (ref 80–100)
MONOCYTES # BLD AUTO: 0.4 K/UL — SIGNIFICANT CHANGE UP (ref 0–0.9)
MONOCYTES NFR BLD AUTO: 6.2 % — SIGNIFICANT CHANGE UP (ref 2–14)
NEUTROPHILS # BLD AUTO: 4.1 K/UL — SIGNIFICANT CHANGE UP (ref 1.8–7.4)
NEUTROPHILS NFR BLD AUTO: 67.6 % — SIGNIFICANT CHANGE UP (ref 43–77)
PLATELET # BLD AUTO: 194 K/UL — SIGNIFICANT CHANGE UP (ref 150–400)
RBC # BLD: 4.58 M/UL — SIGNIFICANT CHANGE UP (ref 4.2–5.8)
RBC # FLD: 13.9 % — SIGNIFICANT CHANGE UP (ref 10.3–14.5)
WBC # BLD: 6 K/UL — SIGNIFICANT CHANGE UP (ref 3.8–10.5)
WBC # FLD AUTO: 6 K/UL — SIGNIFICANT CHANGE UP (ref 3.8–10.5)

## 2024-03-25 ENCOUNTER — RESULT REVIEW (OUTPATIENT)
Age: 59
End: 2024-03-25

## 2024-03-25 ENCOUNTER — APPOINTMENT (OUTPATIENT)
Age: 59
End: 2024-03-25

## 2024-03-25 LAB
BASOPHILS # BLD AUTO: 0 K/UL — SIGNIFICANT CHANGE UP (ref 0–0.2)
BASOPHILS NFR BLD AUTO: 0.6 % — SIGNIFICANT CHANGE UP (ref 0–2)
EOSINOPHIL # BLD AUTO: 0.1 K/UL — SIGNIFICANT CHANGE UP (ref 0–0.5)
EOSINOPHIL NFR BLD AUTO: 1.4 % — SIGNIFICANT CHANGE UP (ref 0–6)
HCT VFR BLD CALC: 40 % — SIGNIFICANT CHANGE UP (ref 39–50)
HGB BLD-MCNC: 13.1 G/DL — SIGNIFICANT CHANGE UP (ref 13–17)
LYMPHOCYTES # BLD AUTO: 0.8 K/UL — LOW (ref 1–3.3)
LYMPHOCYTES # BLD AUTO: 20.5 % — SIGNIFICANT CHANGE UP (ref 13–44)
MCHC RBC-ENTMCNC: 28.9 PG — SIGNIFICANT CHANGE UP (ref 27–34)
MCHC RBC-ENTMCNC: 32.7 G/DL — SIGNIFICANT CHANGE UP (ref 32–36)
MCV RBC AUTO: 88.4 FL — SIGNIFICANT CHANGE UP (ref 80–100)
MONOCYTES # BLD AUTO: 0.3 K/UL — SIGNIFICANT CHANGE UP (ref 0–0.9)
MONOCYTES NFR BLD AUTO: 6.7 % — SIGNIFICANT CHANGE UP (ref 2–14)
NEUTROPHILS # BLD AUTO: 2.8 K/UL — SIGNIFICANT CHANGE UP (ref 1.8–7.4)
NEUTROPHILS NFR BLD AUTO: 70.8 % — SIGNIFICANT CHANGE UP (ref 43–77)
PLATELET # BLD AUTO: 199 K/UL — SIGNIFICANT CHANGE UP (ref 150–400)
RBC # BLD: 4.53 M/UL — SIGNIFICANT CHANGE UP (ref 4.2–5.8)
RBC # FLD: 14.3 % — SIGNIFICANT CHANGE UP (ref 10.3–14.5)
WBC # BLD: 4 K/UL — SIGNIFICANT CHANGE UP (ref 3.8–10.5)
WBC # FLD AUTO: 4 K/UL — SIGNIFICANT CHANGE UP (ref 3.8–10.5)

## 2024-03-26 ENCOUNTER — NON-APPOINTMENT (OUTPATIENT)
Age: 59
End: 2024-03-26

## 2024-03-26 ENCOUNTER — APPOINTMENT (OUTPATIENT)
Age: 59
End: 2024-03-26

## 2024-03-26 ENCOUNTER — APPOINTMENT (OUTPATIENT)
Dept: CT IMAGING | Facility: CLINIC | Age: 59
End: 2024-03-26
Payer: MEDICAID

## 2024-03-26 DIAGNOSIS — R11.2 NAUSEA WITH VOMITING, UNSPECIFIED: ICD-10-CM

## 2024-03-26 DIAGNOSIS — Z51.11 ENCOUNTER FOR ANTINEOPLASTIC CHEMOTHERAPY: ICD-10-CM

## 2024-03-26 PROCEDURE — 71260 CT THORAX DX C+: CPT

## 2024-03-27 DIAGNOSIS — E86.0 DEHYDRATION: ICD-10-CM

## 2024-03-29 ENCOUNTER — RESULT REVIEW (OUTPATIENT)
Age: 59
End: 2024-03-29

## 2024-03-29 ENCOUNTER — APPOINTMENT (OUTPATIENT)
Dept: HEMATOLOGY ONCOLOGY | Facility: CLINIC | Age: 59
End: 2024-03-29

## 2024-03-29 LAB
BASOPHILS # BLD AUTO: 0 K/UL — SIGNIFICANT CHANGE UP (ref 0–0.2)
BASOPHILS NFR BLD AUTO: 0.3 % — SIGNIFICANT CHANGE UP (ref 0–2)
EOSINOPHIL # BLD AUTO: 0 K/UL — SIGNIFICANT CHANGE UP (ref 0–0.5)
EOSINOPHIL NFR BLD AUTO: 0.8 % — SIGNIFICANT CHANGE UP (ref 0–6)
HCT VFR BLD CALC: 40 % — SIGNIFICANT CHANGE UP (ref 39–50)
HGB BLD-MCNC: 13.1 G/DL — SIGNIFICANT CHANGE UP (ref 13–17)
LYMPHOCYTES # BLD AUTO: 1 K/UL — SIGNIFICANT CHANGE UP (ref 1–3.3)
LYMPHOCYTES # BLD AUTO: 18.3 % — SIGNIFICANT CHANGE UP (ref 13–44)
MCHC RBC-ENTMCNC: 28.9 PG — SIGNIFICANT CHANGE UP (ref 27–34)
MCHC RBC-ENTMCNC: 32.8 G/DL — SIGNIFICANT CHANGE UP (ref 32–36)
MCV RBC AUTO: 88 FL — SIGNIFICANT CHANGE UP (ref 80–100)
MONOCYTES # BLD AUTO: 0 K/UL — SIGNIFICANT CHANGE UP (ref 0–0.9)
MONOCYTES NFR BLD AUTO: 0.6 % — LOW (ref 2–14)
NEUTROPHILS # BLD AUTO: 4.2 K/UL — SIGNIFICANT CHANGE UP (ref 1.8–7.4)
NEUTROPHILS NFR BLD AUTO: 80 % — HIGH (ref 43–77)
PLATELET # BLD AUTO: 159 K/UL — SIGNIFICANT CHANGE UP (ref 150–400)
RBC # BLD: 4.54 M/UL — SIGNIFICANT CHANGE UP (ref 4.2–5.8)
RBC # FLD: 14.1 % — SIGNIFICANT CHANGE UP (ref 10.3–14.5)
WBC # BLD: 5.2 K/UL — SIGNIFICANT CHANGE UP (ref 3.8–10.5)
WBC # FLD AUTO: 5.2 K/UL — SIGNIFICANT CHANGE UP (ref 3.8–10.5)

## 2024-04-01 ENCOUNTER — APPOINTMENT (OUTPATIENT)
Age: 59
End: 2024-04-01

## 2024-04-01 LAB
ALBUMIN SERPL ELPH-MCNC: 4.1 G/DL
ALBUMIN SERPL ELPH-MCNC: 4.1 G/DL
ALP BLD-CCNC: 84 U/L
ALP BLD-CCNC: 97 U/L
ALT SERPL-CCNC: 16 U/L
ALT SERPL-CCNC: 34 U/L
ANION GAP SERPL CALC-SCNC: 11 MMOL/L
ANION GAP SERPL CALC-SCNC: 13 MMOL/L
AST SERPL-CCNC: 20 U/L
AST SERPL-CCNC: 25 U/L
BILIRUB SERPL-MCNC: 0.4 MG/DL
BILIRUB SERPL-MCNC: <0.2 MG/DL
BUN SERPL-MCNC: 15 MG/DL
BUN SERPL-MCNC: 9 MG/DL
CALCIUM SERPL-MCNC: 9.3 MG/DL
CALCIUM SERPL-MCNC: 9.8 MG/DL
CHLORIDE SERPL-SCNC: 101 MMOL/L
CHLORIDE SERPL-SCNC: 93 MMOL/L
CO2 SERPL-SCNC: 27 MMOL/L
CO2 SERPL-SCNC: 28 MMOL/L
CREAT SERPL-MCNC: 0.41 MG/DL
CREAT SERPL-MCNC: 0.46 MG/DL
EGFR: 122 ML/MIN/1.73M2
EGFR: 126 ML/MIN/1.73M2
GLUCOSE SERPL-MCNC: 153 MG/DL
GLUCOSE SERPL-MCNC: 184 MG/DL
MAGNESIUM SERPL-MCNC: 1.5 MG/DL
MAGNESIUM SERPL-MCNC: 1.8 MG/DL
POTASSIUM SERPL-SCNC: 4.2 MMOL/L
POTASSIUM SERPL-SCNC: 4.2 MMOL/L
PROT SERPL-MCNC: 7.9 G/DL
PROT SERPL-MCNC: 8.2 G/DL
SODIUM SERPL-SCNC: 134 MMOL/L
SODIUM SERPL-SCNC: 139 MMOL/L

## 2024-04-02 ENCOUNTER — APPOINTMENT (OUTPATIENT)
Age: 59
End: 2024-04-02

## 2024-04-03 ENCOUNTER — NON-APPOINTMENT (OUTPATIENT)
Age: 59
End: 2024-04-03

## 2024-04-05 ENCOUNTER — APPOINTMENT (OUTPATIENT)
Dept: HEMATOLOGY ONCOLOGY | Facility: CLINIC | Age: 59
End: 2024-04-05

## 2024-04-05 ENCOUNTER — RESULT REVIEW (OUTPATIENT)
Age: 59
End: 2024-04-05

## 2024-04-05 LAB
ANISOCYTOSIS BLD QL: SLIGHT — SIGNIFICANT CHANGE UP
BASOPHILS # BLD AUTO: 0 K/UL — SIGNIFICANT CHANGE UP (ref 0–0.2)
BASOPHILS NFR BLD AUTO: 0.3 % — SIGNIFICANT CHANGE UP (ref 0–2)
EOSINOPHIL # BLD AUTO: 0.1 K/UL — SIGNIFICANT CHANGE UP (ref 0–0.5)
EOSINOPHIL NFR BLD AUTO: 1.5 % — SIGNIFICANT CHANGE UP (ref 0–6)
HCT VFR BLD CALC: 35.8 % — LOW (ref 39–50)
HGB BLD-MCNC: 12.3 G/DL — LOW (ref 13–17)
LG PLATELETS BLD QL AUTO: SLIGHT — SIGNIFICANT CHANGE UP
LYMPHOCYTES # BLD AUTO: 0.8 K/UL — LOW (ref 1–3.3)
LYMPHOCYTES # BLD AUTO: 22 % — SIGNIFICANT CHANGE UP (ref 13–44)
MCHC RBC-ENTMCNC: 29.7 PG — SIGNIFICANT CHANGE UP (ref 27–34)
MCHC RBC-ENTMCNC: 34.4 G/DL — SIGNIFICANT CHANGE UP (ref 32–36)
MCV RBC AUTO: 86.6 FL — SIGNIFICANT CHANGE UP (ref 80–100)
MICROCYTES BLD QL: SLIGHT — SIGNIFICANT CHANGE UP
MONOCYTES # BLD AUTO: 0 K/UL — SIGNIFICANT CHANGE UP (ref 0–0.9)
MONOCYTES NFR BLD AUTO: 0.5 % — LOW (ref 2–14)
NEUTROPHILS # BLD AUTO: 2.8 K/UL — SIGNIFICANT CHANGE UP (ref 1.8–7.4)
NEUTROPHILS NFR BLD AUTO: 75.7 % — SIGNIFICANT CHANGE UP (ref 43–77)
PLAT MORPH BLD: NORMAL — SIGNIFICANT CHANGE UP
PLATELET # BLD AUTO: 61 K/UL — LOW (ref 150–400)
PLATELET COUNT - ESTIMATE: ABNORMAL
RBC # BLD: 4.14 M/UL — LOW (ref 4.2–5.8)
RBC # FLD: 12.9 % — SIGNIFICANT CHANGE UP (ref 10.3–14.5)
RBC BLD AUTO: NORMAL — SIGNIFICANT CHANGE UP
SPHEROCYTES BLD QL SMEAR: SLIGHT — SIGNIFICANT CHANGE UP
WBC # BLD: 3.7 K/UL — LOW (ref 3.8–10.5)
WBC # FLD AUTO: 3.7 K/UL — LOW (ref 3.8–10.5)

## 2024-04-12 ENCOUNTER — RESULT REVIEW (OUTPATIENT)
Age: 59
End: 2024-04-12

## 2024-04-12 ENCOUNTER — APPOINTMENT (OUTPATIENT)
Dept: HEMATOLOGY ONCOLOGY | Facility: CLINIC | Age: 59
End: 2024-04-12

## 2024-04-12 ENCOUNTER — OUTPATIENT (OUTPATIENT)
Dept: OUTPATIENT SERVICES | Facility: HOSPITAL | Age: 59
LOS: 1 days | Discharge: HOME | End: 2024-04-12

## 2024-04-12 ENCOUNTER — OUTPATIENT (OUTPATIENT)
Dept: OUTPATIENT SERVICES | Facility: HOSPITAL | Age: 59
LOS: 1 days | End: 2024-04-12
Payer: MEDICAID

## 2024-04-12 DIAGNOSIS — C11.9 MALIGNANT NEOPLASM OF NASOPHARYNX, UNSPECIFIED: ICD-10-CM

## 2024-04-12 LAB
ABO RH CONFIRMATION: SIGNIFICANT CHANGE UP
ANISOCYTOSIS BLD QL: SLIGHT — SIGNIFICANT CHANGE UP
BASOPHILS # BLD AUTO: 0 K/UL — SIGNIFICANT CHANGE UP (ref 0–0.2)
BASOPHILS NFR BLD AUTO: 0.7 % — SIGNIFICANT CHANGE UP (ref 0–2)
BLD GP AB SCN SERPL QL: SIGNIFICANT CHANGE UP
EOSINOPHIL # BLD AUTO: 0 K/UL — SIGNIFICANT CHANGE UP (ref 0–0.5)
EOSINOPHIL NFR BLD AUTO: 1.6 % — SIGNIFICANT CHANGE UP (ref 0–6)
HCT VFR BLD CALC: 29.5 % — LOW (ref 39–50)
HGB BLD-MCNC: 10 G/DL — LOW (ref 13–17)
HYPOCHROMIA BLD QL: SLIGHT — SIGNIFICANT CHANGE UP
LG PLATELETS BLD QL AUTO: SLIGHT — SIGNIFICANT CHANGE UP
LYMPHOCYTES # BLD AUTO: 0.6 K/UL — LOW (ref 1–3.3)
LYMPHOCYTES # BLD AUTO: 60 % — HIGH (ref 13–44)
MCHC RBC-ENTMCNC: 29.3 PG — SIGNIFICANT CHANGE UP (ref 27–34)
MCHC RBC-ENTMCNC: 34.1 G/DL — SIGNIFICANT CHANGE UP (ref 32–36)
MCV RBC AUTO: 86.2 FL — SIGNIFICANT CHANGE UP (ref 80–100)
MICROCYTES BLD QL: SLIGHT — SIGNIFICANT CHANGE UP
MONOCYTES # BLD AUTO: 0 K/UL — SIGNIFICANT CHANGE UP (ref 0–0.9)
MONOCYTES NFR BLD AUTO: 3 % — SIGNIFICANT CHANGE UP (ref 2–14)
NEUTROPHILS # BLD AUTO: 0.5 K/UL — LOW (ref 1.8–7.4)
NEUTROPHILS NFR BLD AUTO: 37 % — LOW (ref 43–77)
PLAT MORPH BLD: NORMAL — SIGNIFICANT CHANGE UP
PLATELET # BLD AUTO: 22 K/UL — LOW (ref 150–400)
RBC # BLD: 3.42 M/UL — LOW (ref 4.2–5.8)
RBC # FLD: 12.1 % — SIGNIFICANT CHANGE UP (ref 10.3–14.5)
RBC BLD AUTO: SIGNIFICANT CHANGE UP
WBC # BLD: 1.2 K/UL — LOW (ref 3.8–10.5)
WBC # FLD AUTO: 1.2 K/UL — LOW (ref 3.8–10.5)

## 2024-04-12 RX ORDER — APIXABAN 5 MG/1
5 TABLET, FILM COATED ORAL
Qty: 60 | Refills: 4 | Status: ACTIVE | COMMUNITY
Start: 1900-01-01 | End: 1900-01-01

## 2024-04-15 ENCOUNTER — RESULT REVIEW (OUTPATIENT)
Age: 59
End: 2024-04-15

## 2024-04-15 ENCOUNTER — APPOINTMENT (OUTPATIENT)
Age: 59
End: 2024-04-15

## 2024-04-15 DIAGNOSIS — C11.9 MALIGNANT NEOPLASM OF NASOPHARYNX, UNSPECIFIED: ICD-10-CM

## 2024-04-15 LAB
ANISOCYTOSIS BLD QL: SLIGHT — SIGNIFICANT CHANGE UP
BASOPHILS # BLD AUTO: 0 K/UL — SIGNIFICANT CHANGE UP (ref 0–0.2)
EOSINOPHIL # BLD AUTO: 0 K/UL — SIGNIFICANT CHANGE UP (ref 0–0.5)
HCT VFR BLD CALC: 30.6 % — LOW (ref 39–50)
HGB BLD-MCNC: 10.4 G/DL — LOW (ref 13–17)
HYPOCHROMIA BLD QL: SLIGHT — SIGNIFICANT CHANGE UP
LYMPHOCYTES # BLD AUTO: 0.5 K/UL — LOW (ref 1–3.3)
LYMPHOCYTES # BLD AUTO: 64 % — HIGH (ref 13–44)
MCHC RBC-ENTMCNC: 29.2 PG — SIGNIFICANT CHANGE UP (ref 27–34)
MCHC RBC-ENTMCNC: 33.9 G/DL — SIGNIFICANT CHANGE UP (ref 32–36)
MCV RBC AUTO: 86.2 FL — SIGNIFICANT CHANGE UP (ref 80–100)
MONOCYTES # BLD AUTO: 0.2 K/UL — SIGNIFICANT CHANGE UP (ref 0–0.9)
MONOCYTES NFR BLD AUTO: 15 % — HIGH (ref 2–14)
NEUTROPHILS # BLD AUTO: 0.1 K/UL — LOW (ref 1.8–7.4)
NEUTROPHILS NFR BLD AUTO: 19 % — LOW (ref 43–77)
PLAT MORPH BLD: NORMAL — SIGNIFICANT CHANGE UP
PLATELET # BLD AUTO: 107 K/UL — LOW (ref 150–400)
POIKILOCYTOSIS BLD QL AUTO: SLIGHT — SIGNIFICANT CHANGE UP
POLYCHROMASIA BLD QL SMEAR: SLIGHT — SIGNIFICANT CHANGE UP
RBC # BLD: 3.55 M/UL — LOW (ref 4.2–5.8)
RBC # FLD: 12.6 % — SIGNIFICANT CHANGE UP (ref 10.3–14.5)
RBC BLD AUTO: SIGNIFICANT CHANGE UP
VARIANT LYMPHS # BLD: 2 % — SIGNIFICANT CHANGE UP (ref 0–6)
WBC # BLD: 0.8 K/UL — CRITICAL LOW (ref 3.8–10.5)
WBC # FLD AUTO: 0.8 K/UL — CRITICAL LOW (ref 3.8–10.5)

## 2024-04-15 PROCEDURE — 36415 COLL VENOUS BLD VENIPUNCTURE: CPT

## 2024-04-15 PROCEDURE — 86901 BLOOD TYPING SEROLOGIC RH(D): CPT

## 2024-04-15 PROCEDURE — 86850 RBC ANTIBODY SCREEN: CPT

## 2024-04-15 PROCEDURE — P9100: CPT

## 2024-04-15 PROCEDURE — 86900 BLOOD TYPING SEROLOGIC ABO: CPT

## 2024-04-15 PROCEDURE — P9037: CPT

## 2024-04-16 ENCOUNTER — APPOINTMENT (OUTPATIENT)
Dept: HEMATOLOGY ONCOLOGY | Facility: CLINIC | Age: 59
End: 2024-04-16
Payer: MEDICAID

## 2024-04-16 ENCOUNTER — APPOINTMENT (OUTPATIENT)
Age: 59
End: 2024-04-16

## 2024-04-16 VITALS
HEIGHT: 69 IN | TEMPERATURE: 99.1 F | HEART RATE: 94 BPM | SYSTOLIC BLOOD PRESSURE: 118 MMHG | DIASTOLIC BLOOD PRESSURE: 74 MMHG | OXYGEN SATURATION: 97 % | WEIGHT: 219 LBS | BODY MASS INDEX: 32.44 KG/M2

## 2024-04-16 DIAGNOSIS — C11.9 MALIGNANT NEOPLASM OF NASOPHARYNX, UNSPECIFIED: ICD-10-CM

## 2024-04-16 LAB
ALBUMIN SERPL ELPH-MCNC: 4.1 G/DL — SIGNIFICANT CHANGE UP (ref 3.3–5)
ALP SERPL-CCNC: 96 U/L — SIGNIFICANT CHANGE UP (ref 40–120)
ALT FLD-CCNC: 16 U/L — SIGNIFICANT CHANGE UP (ref 10–45)
ANION GAP SERPL CALC-SCNC: 13 MMOL/L — SIGNIFICANT CHANGE UP (ref 5–17)
AST SERPL-CCNC: 24 U/L — SIGNIFICANT CHANGE UP (ref 10–40)
BILIRUB SERPL-MCNC: 0.3 MG/DL — SIGNIFICANT CHANGE UP (ref 0.2–1.2)
BUN SERPL-MCNC: 14 MG/DL — SIGNIFICANT CHANGE UP (ref 7–23)
CALCIUM SERPL-MCNC: 9.1 MG/DL — SIGNIFICANT CHANGE UP (ref 8.4–10.5)
CHLORIDE SERPL-SCNC: 95 MMOL/L — LOW (ref 96–108)
CO2 SERPL-SCNC: 25 MMOL/L — SIGNIFICANT CHANGE UP (ref 22–31)
CREAT SERPL-MCNC: 0.64 MG/DL — SIGNIFICANT CHANGE UP (ref 0.5–1.3)
EGFR: 110 ML/MIN/1.73M2 — SIGNIFICANT CHANGE UP
GLUCOSE SERPL-MCNC: 143 MG/DL — HIGH (ref 70–99)
POTASSIUM SERPL-MCNC: 4.4 MMOL/L — SIGNIFICANT CHANGE UP (ref 3.5–5.3)
POTASSIUM SERPL-SCNC: 4.4 MMOL/L — SIGNIFICANT CHANGE UP (ref 3.5–5.3)
PROT SERPL-MCNC: 8.3 G/DL — SIGNIFICANT CHANGE UP (ref 6–8.3)
SODIUM SERPL-SCNC: 133 MMOL/L — LOW (ref 135–145)

## 2024-04-16 PROCEDURE — 99215 OFFICE O/P EST HI 40 MIN: CPT

## 2024-04-16 PROCEDURE — 77470 SPECIAL RADIATION TREATMENT: CPT | Mod: 26

## 2024-04-16 NOTE — PHYSICAL EXAM
[Fully active, able to carry on all pre-disease performance without restriction] : Status 0 - Fully active, able to carry on all pre-disease performance without restriction [Normal] : affect appropriate [de-identified] : cervical lymphadenopathy

## 2024-04-16 NOTE — HISTORY OF PRESENT ILLNESS
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  He sees ENT, Dr. Ibanez for chronic nasopharyngitis. CT on 12/21/23 at  showed soft tissue thickening in nasopharynx and cervical lymph nodes. Pt had a left neck level II FNA on 1/5/24 which showed metastatic nasopharyngeal carcinoma.  MRI at  on 1/30/24 showed metastatic adenopathy involving jugular chains bilaterally with bulky L palatine tonsil.  He saw Dr. Avila Diaz on 2/12/24 and is scheduled to see Dr. Anisha roberts week.  He is scheduled for PET scan on 02/26/24.  He feels ok and is eating a normal diet currently.   [de-identified] : 03/07/24: Mr Jenkins returns for follow up. His PET scan from 02/26/24 shows DG-avid soft tissue thickening in nasopharynx corresponds to known malignancy. Mild, symmetric hypermetabolism in the palatine tonsils is nonspecific. Further evaluation may be performed with direct visualization.  FDG-avid bilateral level II, left level III, and left retropharyngeal lymph node metastases.  FDG-avid consolidation at left lung base is indeterminate. Differential diagnosis includes infectious, inflammatory, and neoplastic etiologies. A 1 month follow-up with dedicated CT of chest is recommended for further evaluation. Increased FDG uptake within the iliopsoas anterior to the right femoral head and adjacent to the gluteus medius insertion on the right greater trochanter, likely inflammatory.   5. Focus of mildly increased radiotracer activity in right molar region of the maxilla likely is related to dental disease. Please correlate clinically.  04/16/24: Mr Jenkins returns for follow up s/p 1 cycle of induction chemotherapy.  He had signficant hematologic toxicity associated with cycle 1 with thrombocytopenia to 22 requiring plt transfusion as well as Neutropenia with a WBC of 0.8.  He feels fatigued, but otherwise no specific complaints.

## 2024-04-16 NOTE — ASSESSMENT
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  It is EBV positive on path report. Discussed treatment of localized nasopharyngeal carcinoma with likely induction chemo therapy followed by chemoRT. Will await PET scan to further characterize and stage and he will follow up for final treatment recs.  If distant mets, would discss chemo-immunotherapy. WIll follow up on 02/29/24 to discuss the results.   03/07/24: Mr Jenkins returns for follow up. His PET scan from 02/26/24 shows DG-avid soft tissue thickening in nasopharynx corresponds to known malignancy. Mild, symmetric hypermetabolism in the palatine tonsils is nonspecific. Further evaluation may be performed with direct visualization.  FDG-avid bilateral level II, left level III, and left retropharyngeal lymph node metastases.  FDG-avid consolidation at left lung base is indeterminate. Differential diagnosis includes infectious, inflammatory, and neoplastic etiologies. A 1 month follow-up with dedicated CT of chest is recommended for further evaluation. Increased FDG uptake within the iliopsoas anterior to the right femoral head and adjacent to the gluteus medius insertion on the right greater trochanter, likely inflammatory. -Given significant bilateral mari metastases, will plan for 3 cycles of induction chemotherapy with gemcitabine cisplatin followed by chemoRT with cisplatin. Return to initiate chemotherapy  04/16/24: Mr Jenkins returns for follow up s/p 1 cycle of induction chemotherapy.  He had signficant hematologic toxicity associated with cycle 1 with thrombocytopenia to 22 requiring plt transfusion as well as Neutropenia with a WBC of 0.8.  He feels fatigued, but otherwise no specific complaints. -Given the signficant hematologic toxicity, I have recommended that we transition to chemoRT as continued induction may hinder his definitive treatment w CRT.  Will give neulasta tomorrow and he will see Dr. Lancaster to discuss initiation of CRT

## 2024-04-17 ENCOUNTER — APPOINTMENT (OUTPATIENT)
Dept: RADIATION ONCOLOGY | Facility: CLINIC | Age: 59
End: 2024-04-17
Payer: MEDICAID

## 2024-04-17 ENCOUNTER — APPOINTMENT (OUTPATIENT)
Age: 59
End: 2024-04-17

## 2024-04-17 VITALS
TEMPERATURE: 97 F | DIASTOLIC BLOOD PRESSURE: 71 MMHG | RESPIRATION RATE: 15 BRPM | SYSTOLIC BLOOD PRESSURE: 108 MMHG | OXYGEN SATURATION: 98 % | HEIGHT: 69 IN | BODY MASS INDEX: 31.55 KG/M2 | WEIGHT: 213 LBS | HEART RATE: 93 BPM

## 2024-04-17 PROCEDURE — 99215 OFFICE O/P EST HI 40 MIN: CPT | Mod: 25

## 2024-04-17 PROCEDURE — 77263 THER RADIOLOGY TX PLNG CPLX: CPT

## 2024-04-17 PROCEDURE — 77334 RADIATION TREATMENT AID(S): CPT | Mod: 26

## 2024-04-17 NOTE — REVIEW OF SYSTEMS
[Fatigue] : fatigue [Dysphagia] : dysphagia [Odynophagia] : odynophagia [Negative] : Allergic/Immunologic [Dysphagia: Grade 1 - Symptomatic, able to eat regular diet] : Dysphagia: Grade 1 - Symptomatic, able to eat regular diet [Edema Limbs: Grade 0] : Edema Limbs: Grade 0  [Fatigue: Grade 1 - Fatigue relieved by rest] : Fatigue: Grade 1 - Fatigue relieved by rest [Localized Edema: Grade 1 - Localized to dependent areas, no disability or functional impairment] : Localized Edema: Grade 1 - Localized to dependent areas, no disability or functional impairment [Neck Edema: Grade 1 - Asymptomatic localized neck edema] : Neck Edema: Grade 1 - Asymptomatic localized neck edema [Xerostomia: Grade 1 - Symptomatic (e.g., dry or thick saliva) without significant dietary alteration; unstimulated saliva flow >0.2 ml/min] : Xerostomia: Grade 1 - Symptomatic (e.g., dry or thick saliva) without significant dietary alteration; unstimulated saliva flow >0.2 ml/min [Dysgeusia: Grade 1- Altered taste but no change in diet] : Dysgeusia: Grade 1 - Altered taste but no change in diet

## 2024-04-17 NOTE — VITALS
[Maximal Pain Intensity: 7/10] : 7/10 [Least Pain Intensity: 6/10] : 6/10 [Pain Description/Quality: ___] : Pain description/quality: [unfilled] [Pain Duration: ___] : Pain duration: [unfilled] [Pain Location: ___] : Pain Location: [unfilled] [Pain Interferes with ADLs] : Pain interferes with activities of daily living. [NoTreatment Scheduled] : no treatment scheduled [80: Normal activity with effort; some signs or symptoms of disease.] : 80: Normal activity with effort; some signs or symptoms of disease.

## 2024-04-17 NOTE — PHYSICAL EXAM
[Extraocular Movements] : extraocular movements were intact [Outer Ear] : the ears and nose were normal in appearance [Normal Oral Cavity] : oral cavity was normal [Normal] : no focal deficits [de-identified] : Bilateral adenopathy

## 2024-04-17 NOTE — HISTORY OF PRESENT ILLNESS
[FreeTextEntry1] : 58-year-old gentleman returns today for further discussion regarding radiation therapy for nasopharyngeal carcinoma.  Recall, his past medical history is significant for cigarette tobacco use, half pack for 15 years.  Former drinker, quit several months ago.  He presented to Dr. Ibanez for evaluation of chronic nasopharyngitis.    12/21/23 CT neck showed soft tissue thickening within the nasopharynx measuring up to 1.6 cm. Pathologically enlarged left greater than right cervical lymph nodes, measuring up to 3.9 cm.  1/5/24 needle biopsy left cervical level 2 lymph node showed metastatic nonkeratinizing nasopharyngeal carcinoma, undifferentiated.   1/30/24 MRI face orbits showed metastatic adenopathy involving the jugular chains bilaterally with a bulky left palatine tonsil with no deep infiltrating mass.   He met with Dr. Avila Diaz on 2/12/24 and Dr. Andrew Fischer on 2/16/24.  Interval history: 2/26/24 PET/CT showed: 1. FDG-avid soft tissue thickening in nasopharynx corresponds to known malignancy. Mild, symmetric hypermetabolism in the palatine tonsils is nonspecific. Further evaluation may be performed with direct visualization. 2. FDG-avid bilateral level II, left level III, and left retropharyngeal lymph node metastases. 3. FDG-avid consolidation at left lung base is indeterminate. Differential diagnosis includes infectious, inflammatory, and neoplastic etiologies. A 1 month follow-up with dedicated CT of chest is recommended for further evaluation. 4. Increased FDG uptake within the iliopsoas anterior to the right femoral head and adjacent to the gluteus medius insertion on the right greater trochanter, likely inflammatory. 5. Focus of mildly increased radiotracer activity in right molar region of the maxilla likely is related to dental disease. Please correlate clinically.  He was started on neoadjuvant gemcitabine/cisplatin on 3/25/24.  3/26/24 CT chest showed interval decrease in size of left basilar consolidative opacity with residual 1.2 x 1 cm nodular opacity remaining.  He experienced significant hematologic toxicity following cycle 1 of induction chemotherapy, with neutropenia and thrombocytopenia to 22 requiring platelet transfusion.  Reports left neck mass, slight xerostomia/ dysgeusia, slight odynophagia, slight dysphagia,  throat pain and fatigue  No nasal discharge, change in sense of smell or taste, otalgia  or weight loss.    He has a history of DVT, and is on Eliquis. 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  He is alone in this [place].  Care team: [facility] Andrew Fischer PCP Katie Cantor

## 2024-04-18 DIAGNOSIS — Z51.89 ENCOUNTER FOR OTHER SPECIFIED AFTERCARE: ICD-10-CM

## 2024-04-19 ENCOUNTER — APPOINTMENT (OUTPATIENT)
Dept: HEMATOLOGY ONCOLOGY | Facility: CLINIC | Age: 59
End: 2024-04-19

## 2024-04-19 ENCOUNTER — RESULT REVIEW (OUTPATIENT)
Age: 59
End: 2024-04-19

## 2024-04-19 LAB
ANISOCYTOSIS BLD QL: SLIGHT — SIGNIFICANT CHANGE UP
BASOPHILS # BLD AUTO: 0.1 K/UL — SIGNIFICANT CHANGE UP (ref 0–0.2)
BLASTS # FLD: 2 % — HIGH (ref 0–0)
ELLIPTOCYTES BLD QL SMEAR: SLIGHT — SIGNIFICANT CHANGE UP
EOSINOPHIL # BLD AUTO: 0.1 K/UL — SIGNIFICANT CHANGE UP (ref 0–0.5)
EOSINOPHIL NFR BLD AUTO: 1 % — SIGNIFICANT CHANGE UP (ref 0–6)
HCT VFR BLD CALC: 32 % — LOW (ref 39–50)
HGB BLD-MCNC: 10.9 G/DL — LOW (ref 13–17)
LG PLATELETS BLD QL AUTO: SLIGHT — SIGNIFICANT CHANGE UP
LYMPHOCYTES # BLD AUTO: 0.8 K/UL — LOW (ref 1–3.3)
LYMPHOCYTES # BLD AUTO: 4 % — LOW (ref 13–44)
LYMPHOCYTES # SPEC AUTO: 1 % — HIGH (ref 0–0)
MACROCYTES BLD QL: SLIGHT — SIGNIFICANT CHANGE UP
MCHC RBC-ENTMCNC: 29.8 PG — SIGNIFICANT CHANGE UP (ref 27–34)
MCHC RBC-ENTMCNC: 34.2 G/DL — SIGNIFICANT CHANGE UP (ref 32–36)
MCV RBC AUTO: 87.4 FL — SIGNIFICANT CHANGE UP (ref 80–100)
METAMYELOCYTES # FLD: 4 % — HIGH (ref 0–0)
MICROCYTES BLD QL: SLIGHT — SIGNIFICANT CHANGE UP
MONOCYTES # BLD AUTO: 1.2 K/UL — HIGH (ref 0–0.9)
MONOCYTES NFR BLD AUTO: 8 % — SIGNIFICANT CHANGE UP (ref 2–14)
MYELOCYTES NFR BLD: 3 % — HIGH (ref 0–0)
NEUTROPHILS # BLD AUTO: 12.9 K/UL — HIGH (ref 1.8–7.4)
NEUTROPHILS NFR BLD AUTO: 74 % — SIGNIFICANT CHANGE UP (ref 43–77)
NEUTS BAND # BLD: 3 % — SIGNIFICANT CHANGE UP (ref 0–8)
NRBC # BLD: 1 /100 WBCS — HIGH (ref 0–0)
PLAT MORPH BLD: NORMAL — SIGNIFICANT CHANGE UP
PLATELET # BLD AUTO: 179 K/UL — SIGNIFICANT CHANGE UP (ref 150–400)
POIKILOCYTOSIS BLD QL AUTO: SLIGHT — SIGNIFICANT CHANGE UP
POLYCHROMASIA BLD QL SMEAR: SLIGHT — SIGNIFICANT CHANGE UP
RBC # BLD: 3.66 M/UL — LOW (ref 4.2–5.8)
RBC # FLD: 14.2 % — SIGNIFICANT CHANGE UP (ref 10.3–14.5)
RBC BLD AUTO: SIGNIFICANT CHANGE UP
SMUDGE CELLS # BLD: PRESENT — SIGNIFICANT CHANGE UP
TOXIC GRANULES BLD QL SMEAR: PRESENT — SIGNIFICANT CHANGE UP
WBC # BLD: 15.9 K/UL — HIGH (ref 3.8–10.5)
WBC # FLD AUTO: 15.9 K/UL — HIGH (ref 3.8–10.5)

## 2024-04-21 DIAGNOSIS — R11.2 NAUSEA WITH VOMITING, UNSPECIFIED: ICD-10-CM

## 2024-04-22 ENCOUNTER — APPOINTMENT (OUTPATIENT)
Age: 59
End: 2024-04-22

## 2024-04-22 ENCOUNTER — NON-APPOINTMENT (OUTPATIENT)
Age: 59
End: 2024-04-22

## 2024-04-23 ENCOUNTER — APPOINTMENT (OUTPATIENT)
Age: 59
End: 2024-04-23

## 2024-04-23 LAB — MANUAL DIF COMMENT BLD-IMP: SIGNIFICANT CHANGE UP

## 2024-04-29 ENCOUNTER — APPOINTMENT (OUTPATIENT)
Dept: OTOLARYNGOLOGY | Facility: CLINIC | Age: 59
End: 2024-04-29

## 2024-04-29 PROCEDURE — 77300 RADIATION THERAPY DOSE PLAN: CPT | Mod: 26

## 2024-04-29 PROCEDURE — 77338 DESIGN MLC DEVICE FOR IMRT: CPT | Mod: 26

## 2024-04-29 PROCEDURE — 77301 RADIOTHERAPY DOSE PLAN IMRT: CPT | Mod: 26

## 2024-04-29 NOTE — PROCEDURE
[None] : none [Flexible Endoscope] : examined with the flexible endoscope [Serial Number: ___] : Serial Number: [unfilled] [FreeTextEntry6] : Fullness in the NPx bilaterally consistent with primary lesion in the NPx.  No lesions in the NC, no drainage. [de-identified] : NPC

## 2024-04-29 NOTE — DATA REVIEWED
[de-identified] : PET/CT Neck independently interpreted - findings of NPC with bilateral LAD, avid left lung consolidation.

## 2024-04-29 NOTE — HISTORY OF PRESENT ILLNESS
[de-identified] : 59 y/o M  History of DVT, on Eliquis and here to f/u NPC.  PET/CT FDG done 02/26/24  History of Left neck Level II FNA on 1/5/24 which showed metastatic nasopharyngeal carcinoma.  MRI at  on 1/30/24 showed metastatic adenopathy involving jugular chains bilaterally with bulky L palatine tonsil.   Pt is on CRT with Olivia and Dr. Fischer.  last chest CT Interval decrease in size of left basilar consolidative opacity with residual 1.2 x 1 cm nodular opacity remaining. Recommend CT chest follow-up in 3 months to ensure complete clearing  Pt is eating well, denies pain and SOB.    Denies unintentional weight loss Reports anterior rhinorrhea starting last night. No fevers reported.   Pt smokes 1/2 ppd for 15 years -- reports smoking less that 1/2 ppd today. Former drinker.

## 2024-04-29 NOTE — PHYSICAL EXAM
[Nasal Endoscopy Performed] : nasal endoscopy was performed, see procedure section for findings [Normal] : no rashes [de-identified] : Approx. 3 cm L. level II LN, firm, mobile, no TTP. [FreeTextEntry1] : No concerning lesions in the OC/OPx on inspection or palpation.

## 2024-04-29 NOTE — HISTORY OF PRESENT ILLNESS
[de-identified] : 59 y/o M  History of DVT, on Eliquis and here to f/u NPC.  PET/CT FDG done 02/26/24  History of Left neck Level II FNA on 1/5/24 which showed metastatic nasopharyngeal carcinoma.  MRI at  on 1/30/24 showed metastatic adenopathy involving jugular chains bilaterally with bulky L palatine tonsil.   Pt is on CRT with Olivia and Dr. Fischer.  last chest CT Interval decrease in size of left basilar consolidative opacity with residual 1.2 x 1 cm nodular opacity remaining. Recommend CT chest follow-up in 3 months to ensure complete clearing  Pt is eating well, denies pain and SOB.    Denies unintentional weight loss Reports anterior rhinorrhea starting last night. No fevers reported.   Pt smokes 1/2 ppd for 15 years -- reports smoking less that 1/2 ppd today. Former drinker.

## 2024-04-29 NOTE — DATA REVIEWED
[de-identified] : PET/CT Neck independently interpreted - findings of NPC with bilateral LAD, avid left lung consolidation.

## 2024-04-29 NOTE — PHYSICAL EXAM
[Nasal Endoscopy Performed] : nasal endoscopy was performed, see procedure section for findings [Normal] : no rashes [de-identified] : Approx. 3 cm L. level II LN, firm, mobile, no TTP. [FreeTextEntry1] : No concerning lesions in the OC/OPx on inspection or palpation.

## 2024-04-29 NOTE — PROCEDURE
[None] : none [Flexible Endoscope] : examined with the flexible endoscope [Serial Number: ___] : Serial Number: [unfilled] [FreeTextEntry6] : Fullness in the NPx bilaterally consistent with primary lesion in the NPx.  No lesions in the NC, no drainage. [de-identified] : NPC

## 2024-05-03 ENCOUNTER — RESULT REVIEW (OUTPATIENT)
Age: 59
End: 2024-05-03

## 2024-05-03 ENCOUNTER — APPOINTMENT (OUTPATIENT)
Dept: HEMATOLOGY ONCOLOGY | Facility: CLINIC | Age: 59
End: 2024-05-03

## 2024-05-03 LAB
BASOPHILS # BLD AUTO: 0 K/UL — SIGNIFICANT CHANGE UP (ref 0–0.2)
BASOPHILS NFR BLD AUTO: 0.8 % — SIGNIFICANT CHANGE UP (ref 0–2)
EOSINOPHIL # BLD AUTO: 0 K/UL — SIGNIFICANT CHANGE UP (ref 0–0.5)
EOSINOPHIL NFR BLD AUTO: 0.2 % — SIGNIFICANT CHANGE UP (ref 0–6)
HCT VFR BLD CALC: 28.9 % — LOW (ref 39–50)
HGB BLD-MCNC: 9.9 G/DL — LOW (ref 13–17)
LYMPHOCYTES # BLD AUTO: 1.1 K/UL — SIGNIFICANT CHANGE UP (ref 1–3.3)
LYMPHOCYTES # BLD AUTO: 23.1 % — SIGNIFICANT CHANGE UP (ref 13–44)
MCHC RBC-ENTMCNC: 29.7 PG — SIGNIFICANT CHANGE UP (ref 27–34)
MCHC RBC-ENTMCNC: 34.3 G/DL — SIGNIFICANT CHANGE UP (ref 32–36)
MCV RBC AUTO: 86.6 FL — SIGNIFICANT CHANGE UP (ref 80–100)
MONOCYTES # BLD AUTO: 0.7 K/UL — SIGNIFICANT CHANGE UP (ref 0–0.9)
MONOCYTES NFR BLD AUTO: 15.1 % — HIGH (ref 2–14)
NEUTROPHILS # BLD AUTO: 2.8 K/UL — SIGNIFICANT CHANGE UP (ref 1.8–7.4)
NEUTROPHILS NFR BLD AUTO: 60.8 % — SIGNIFICANT CHANGE UP (ref 43–77)
PLATELET # BLD AUTO: 266 K/UL — SIGNIFICANT CHANGE UP (ref 150–400)
RBC # BLD: 3.34 M/UL — LOW (ref 4.2–5.8)
RBC # FLD: 14.7 % — HIGH (ref 10.3–14.5)
WBC # BLD: 4.6 K/UL — SIGNIFICANT CHANGE UP (ref 3.8–10.5)
WBC # FLD AUTO: 4.6 K/UL — SIGNIFICANT CHANGE UP (ref 3.8–10.5)

## 2024-05-05 ENCOUNTER — NON-APPOINTMENT (OUTPATIENT)
Age: 59
End: 2024-05-05

## 2024-05-06 ENCOUNTER — APPOINTMENT (OUTPATIENT)
Age: 59
End: 2024-05-06

## 2024-05-06 PROCEDURE — 77427 RADIATION TX MANAGEMENT X5: CPT

## 2024-05-06 PROCEDURE — 77387B: CUSTOM | Mod: 26

## 2024-05-07 ENCOUNTER — APPOINTMENT (OUTPATIENT)
Dept: RADIATION ONCOLOGY | Facility: CLINIC | Age: 59
End: 2024-05-07

## 2024-05-07 DIAGNOSIS — E86.0 DEHYDRATION: ICD-10-CM

## 2024-05-07 DIAGNOSIS — Z51.11 ENCOUNTER FOR ANTINEOPLASTIC CHEMOTHERAPY: ICD-10-CM

## 2024-05-07 PROCEDURE — 77387B: CUSTOM | Mod: 26

## 2024-05-08 PROCEDURE — 77387B: CUSTOM | Mod: 26

## 2024-05-09 ENCOUNTER — NON-APPOINTMENT (OUTPATIENT)
Age: 59
End: 2024-05-09

## 2024-05-09 VITALS
RESPIRATION RATE: 16 BRPM | WEIGHT: 220 LBS | HEIGHT: 69 IN | DIASTOLIC BLOOD PRESSURE: 92 MMHG | HEART RATE: 100 BPM | OXYGEN SATURATION: 98 % | BODY MASS INDEX: 32.58 KG/M2 | SYSTOLIC BLOOD PRESSURE: 152 MMHG | TEMPERATURE: 98.1 F

## 2024-05-09 PROCEDURE — 77387B: CUSTOM | Mod: 26

## 2024-05-09 NOTE — REVIEW OF SYSTEMS
[Dysphagia: Grade 0] : Dysphagia: Grade 0 [Edema Limbs: Grade 0] : Edema Limbs: Grade 0  [Fatigue: Grade 0] : Fatigue: Grade 0 [Localized Edema: Grade 0] : Localized Edema: Grade 0  [Neck Edema: Grade 0] : Neck Edema: Grade 0 [Xerostomia: Grade 1 - Symptomatic (e.g., dry or thick saliva) without significant dietary alteration; unstimulated saliva flow >0.2 ml/min] : Xerostomia: Grade 1 - Symptomatic (e.g., dry or thick saliva) without significant dietary alteration; unstimulated saliva flow >0.2 ml/min [Dysgeusia: Grade 0] : Dysgeusia: Grade 0 [Dyspnea: Grade 0] : Dyspnea: Grade 0 [Skin Hyperpigmentation: Grade 0] : Skin Hyperpigmentation: Grade 0

## 2024-05-09 NOTE — PHYSICAL EXAM
[Sclera] : the sclera and conjunctiva were normal [Extraocular Movements] : extraocular movements were intact [Outer Ear] : the ears and nose were normal in appearance [Normal Oral Cavity] : oral cavity was normal [Normal] : no respiratory distress, lungs were clear to auscultation bilaterally

## 2024-05-09 NOTE — DISEASE MANAGEMENT
[Clinical] : TNM Stage: c [III] : III [FreeTextEntry4] : NPC [TTNM] : 1 [NTNM] : 2 [MTNM] : 0 [de-identified] : 202 [de-identified] : 6774 [de-identified] : nasopharynx

## 2024-05-10 ENCOUNTER — RESULT REVIEW (OUTPATIENT)
Age: 59
End: 2024-05-10

## 2024-05-10 ENCOUNTER — APPOINTMENT (OUTPATIENT)
Dept: HEMATOLOGY ONCOLOGY | Facility: CLINIC | Age: 59
End: 2024-05-10

## 2024-05-10 ENCOUNTER — NON-APPOINTMENT (OUTPATIENT)
Age: 59
End: 2024-05-10

## 2024-05-10 LAB
BASOPHILS # BLD AUTO: 0 K/UL — SIGNIFICANT CHANGE UP (ref 0–0.2)
BASOPHILS NFR BLD AUTO: 0.2 % — SIGNIFICANT CHANGE UP (ref 0–2)
EOSINOPHIL # BLD AUTO: 0.1 K/UL — SIGNIFICANT CHANGE UP (ref 0–0.5)
EOSINOPHIL NFR BLD AUTO: 1 % — SIGNIFICANT CHANGE UP (ref 0–6)
HCT VFR BLD CALC: 29.8 % — LOW (ref 39–50)
HGB BLD-MCNC: 10.1 G/DL — LOW (ref 13–17)
LYMPHOCYTES # BLD AUTO: 0.8 K/UL — LOW (ref 1–3.3)
LYMPHOCYTES # BLD AUTO: 15.2 % — SIGNIFICANT CHANGE UP (ref 13–44)
MCHC RBC-ENTMCNC: 30.2 PG — SIGNIFICANT CHANGE UP (ref 27–34)
MCHC RBC-ENTMCNC: 34 G/DL — SIGNIFICANT CHANGE UP (ref 32–36)
MCV RBC AUTO: 89.1 FL — SIGNIFICANT CHANGE UP (ref 80–100)
MONOCYTES # BLD AUTO: 0.5 K/UL — SIGNIFICANT CHANGE UP (ref 0–0.9)
MONOCYTES NFR BLD AUTO: 9.2 % — SIGNIFICANT CHANGE UP (ref 2–14)
NEUTROPHILS # BLD AUTO: 3.9 K/UL — SIGNIFICANT CHANGE UP (ref 1.8–7.4)
NEUTROPHILS NFR BLD AUTO: 74.4 % — SIGNIFICANT CHANGE UP (ref 43–77)
PLATELET # BLD AUTO: 158 K/UL — SIGNIFICANT CHANGE UP (ref 150–400)
RBC # BLD: 3.35 M/UL — LOW (ref 4.2–5.8)
RBC # FLD: 15.6 % — HIGH (ref 10.3–14.5)
WBC # BLD: 5.3 K/UL — SIGNIFICANT CHANGE UP (ref 3.8–10.5)
WBC # FLD AUTO: 5.3 K/UL — SIGNIFICANT CHANGE UP (ref 3.8–10.5)

## 2024-05-10 PROCEDURE — 77387B: CUSTOM | Mod: 26

## 2024-05-10 NOTE — PHYSICAL EXAM
[Sclera] : the sclera and conjunctiva were normal [Extraocular Movements] : extraocular movements were intact [Outer Ear] : the ears and nose were normal in appearance [Normal Oral Cavity] : oral cavity was normal [Normal] : oriented to person, place and time, the affect was normal, the mood was normal and not anxious

## 2024-05-13 ENCOUNTER — APPOINTMENT (OUTPATIENT)
Age: 59
End: 2024-05-13

## 2024-05-13 ENCOUNTER — APPOINTMENT (OUTPATIENT)
Dept: HEMATOLOGY ONCOLOGY | Facility: CLINIC | Age: 59
End: 2024-05-13

## 2024-05-13 PROCEDURE — 77427 RADIATION TX MANAGEMENT X5: CPT

## 2024-05-13 PROCEDURE — 77387B: CUSTOM | Mod: 26

## 2024-05-14 PROCEDURE — 77387B: CUSTOM | Mod: 26

## 2024-05-15 PROCEDURE — 77387B: CUSTOM | Mod: 26

## 2024-05-16 PROCEDURE — 77014: CPT | Mod: 26

## 2024-05-17 ENCOUNTER — APPOINTMENT (OUTPATIENT)
Dept: HEMATOLOGY ONCOLOGY | Facility: CLINIC | Age: 59
End: 2024-05-17
Payer: MEDICAID

## 2024-05-17 ENCOUNTER — RESULT REVIEW (OUTPATIENT)
Age: 59
End: 2024-05-17

## 2024-05-17 ENCOUNTER — NON-APPOINTMENT (OUTPATIENT)
Age: 59
End: 2024-05-17

## 2024-05-17 VITALS
HEIGHT: 69 IN | SYSTOLIC BLOOD PRESSURE: 131 MMHG | HEART RATE: 107 BPM | WEIGHT: 216.06 LBS | DIASTOLIC BLOOD PRESSURE: 85 MMHG | BODY MASS INDEX: 32 KG/M2 | OXYGEN SATURATION: 99 %

## 2024-05-17 VITALS
HEART RATE: 106 BPM | DIASTOLIC BLOOD PRESSURE: 90 MMHG | SYSTOLIC BLOOD PRESSURE: 151 MMHG | HEIGHT: 69 IN | OXYGEN SATURATION: 96 % | WEIGHT: 217 LBS | BODY MASS INDEX: 32.14 KG/M2 | RESPIRATION RATE: 16 BRPM

## 2024-05-17 LAB
BASOPHILS # BLD AUTO: 0 K/UL — SIGNIFICANT CHANGE UP (ref 0–0.2)
BASOPHILS NFR BLD AUTO: 0.3 % — SIGNIFICANT CHANGE UP (ref 0–2)
EOSINOPHIL # BLD AUTO: 0.1 K/UL — SIGNIFICANT CHANGE UP (ref 0–0.5)
EOSINOPHIL NFR BLD AUTO: 1 % — SIGNIFICANT CHANGE UP (ref 0–6)
HCT VFR BLD CALC: 29.4 % — LOW (ref 39–50)
HGB BLD-MCNC: 9.9 G/DL — LOW (ref 13–17)
LYMPHOCYTES # BLD AUTO: 0.3 K/UL — LOW (ref 1–3.3)
LYMPHOCYTES # BLD AUTO: 4.8 % — LOW (ref 13–44)
MCHC RBC-ENTMCNC: 30.2 PG — SIGNIFICANT CHANGE UP (ref 27–34)
MCHC RBC-ENTMCNC: 33.6 G/DL — SIGNIFICANT CHANGE UP (ref 32–36)
MCV RBC AUTO: 89.9 FL — SIGNIFICANT CHANGE UP (ref 80–100)
MONOCYTES # BLD AUTO: 0.5 K/UL — SIGNIFICANT CHANGE UP (ref 0–0.9)
MONOCYTES NFR BLD AUTO: 7.5 % — SIGNIFICANT CHANGE UP (ref 2–14)
NEUTROPHILS # BLD AUTO: 5.4 K/UL — SIGNIFICANT CHANGE UP (ref 1.8–7.4)
NEUTROPHILS NFR BLD AUTO: 86.3 % — HIGH (ref 43–77)
PLATELET # BLD AUTO: 163 K/UL — SIGNIFICANT CHANGE UP (ref 150–400)
RBC # BLD: 3.27 M/UL — LOW (ref 4.2–5.8)
RBC # FLD: 15.6 % — HIGH (ref 10.3–14.5)
WBC # BLD: 6.3 K/UL — SIGNIFICANT CHANGE UP (ref 3.8–10.5)
WBC # FLD AUTO: 6.3 K/UL — SIGNIFICANT CHANGE UP (ref 3.8–10.5)

## 2024-05-17 PROCEDURE — 99214 OFFICE O/P EST MOD 30 MIN: CPT

## 2024-05-17 PROCEDURE — 77387B: CUSTOM | Mod: 26

## 2024-05-17 NOTE — DISEASE MANAGEMENT
[Clinical] : TNM Stage: c [III] : III [FreeTextEntry4] : NPC [TTNM] : 1 [NTNM] : 2 [MTNM] : 0 [de-identified] : 2000 [de-identified] : 2501 [de-identified] : nasopharynx

## 2024-05-17 NOTE — REVIEW OF SYSTEMS
[Dysphagia: Grade 0] : Dysphagia: Grade 0 [Edema Limbs: Grade 0] : Edema Limbs: Grade 0  [Fatigue: Grade 0] : Fatigue: Grade 0 [Localized Edema: Grade 0] : Localized Edema: Grade 0  [Neck Edema: Grade 0] : Neck Edema: Grade 0 [Xerostomia: Grade 1 - Symptomatic (e.g., dry or thick saliva) without significant dietary alteration; unstimulated saliva flow >0.2 ml/min] : Xerostomia: Grade 1 - Symptomatic (e.g., dry or thick saliva) without significant dietary alteration; unstimulated saliva flow >0.2 ml/min [Dysgeusia: Grade 0] : Dysgeusia: Grade 0 [Dyspnea: Grade 0] : Dyspnea: Grade 0 [Skin Hyperpigmentation: Grade 0] : Skin Hyperpigmentation: Grade 0 [Dermatitis Radiation: Grade 0] : Dermatitis Radiation: Grade 0

## 2024-05-18 LAB
ALBUMIN SERPL ELPH-MCNC: 3.9 G/DL
ALBUMIN SERPL ELPH-MCNC: 3.9 G/DL
ALBUMIN SERPL ELPH-MCNC: 4 G/DL
ALP BLD-CCNC: 101 U/L
ALP BLD-CCNC: 104 U/L
ALP BLD-CCNC: 113 U/L
ALP BLD-CCNC: 127 U/L
ALP BLD-CCNC: 93 U/L
ALT SERPL-CCNC: 11 U/L
ALT SERPL-CCNC: 12 U/L
ALT SERPL-CCNC: 18 U/L
ALT SERPL-CCNC: 19 U/L
ALT SERPL-CCNC: 20 U/L
ANION GAP SERPL CALC-SCNC: 10 MMOL/L
ANION GAP SERPL CALC-SCNC: 11 MMOL/L
ANION GAP SERPL CALC-SCNC: 11 MMOL/L
ANION GAP SERPL CALC-SCNC: 12 MMOL/L
ANION GAP SERPL CALC-SCNC: 14 MMOL/L
AST SERPL-CCNC: 13 U/L
AST SERPL-CCNC: 15 U/L
AST SERPL-CCNC: 16 U/L
AST SERPL-CCNC: 24 U/L
AST SERPL-CCNC: 28 U/L
BILIRUB SERPL-MCNC: 0.2 MG/DL
BILIRUB SERPL-MCNC: 0.3 MG/DL
BILIRUB SERPL-MCNC: <0.2 MG/DL
BUN SERPL-MCNC: 10 MG/DL
BUN SERPL-MCNC: 11 MG/DL
BUN SERPL-MCNC: 12 MG/DL
BUN SERPL-MCNC: 12 MG/DL
BUN SERPL-MCNC: 9 MG/DL
CALCIUM SERPL-MCNC: 8.8 MG/DL
CALCIUM SERPL-MCNC: 8.8 MG/DL
CALCIUM SERPL-MCNC: 9.4 MG/DL
CALCIUM SERPL-MCNC: 9.5 MG/DL
CALCIUM SERPL-MCNC: 9.5 MG/DL
CHLORIDE SERPL-SCNC: 100 MMOL/L
CHLORIDE SERPL-SCNC: 102 MMOL/L
CHLORIDE SERPL-SCNC: 95 MMOL/L
CHLORIDE SERPL-SCNC: 98 MMOL/L
CHLORIDE SERPL-SCNC: 99 MMOL/L
CO2 SERPL-SCNC: 25 MMOL/L
CO2 SERPL-SCNC: 25 MMOL/L
CO2 SERPL-SCNC: 27 MMOL/L
CO2 SERPL-SCNC: 27 MMOL/L
CO2 SERPL-SCNC: 28 MMOL/L
CREAT SERPL-MCNC: 0.49 MG/DL
CREAT SERPL-MCNC: 0.56 MG/DL
CREAT SERPL-MCNC: 0.57 MG/DL
CREAT SERPL-MCNC: 0.69 MG/DL
CREAT SERPL-MCNC: 0.72 MG/DL
EGFR: 107 ML/MIN/1.73M2
EGFR: 108 ML/MIN/1.73M2
EGFR: 114 ML/MIN/1.73M2
EGFR: 115 ML/MIN/1.73M2
EGFR: 120 ML/MIN/1.73M2
GLUCOSE SERPL-MCNC: 124 MG/DL
GLUCOSE SERPL-MCNC: 135 MG/DL
GLUCOSE SERPL-MCNC: 141 MG/DL
GLUCOSE SERPL-MCNC: 182 MG/DL
GLUCOSE SERPL-MCNC: 214 MG/DL
MAGNESIUM SERPL-MCNC: 0.8 MG/DL
MAGNESIUM SERPL-MCNC: 1.1 MG/DL
MAGNESIUM SERPL-MCNC: 1.4 MG/DL
MAGNESIUM SERPL-MCNC: 1.5 MG/DL
MAGNESIUM SERPL-MCNC: 1.6 MG/DL
POTASSIUM SERPL-SCNC: 4.2 MMOL/L
POTASSIUM SERPL-SCNC: 4.3 MMOL/L
POTASSIUM SERPL-SCNC: 4.3 MMOL/L
POTASSIUM SERPL-SCNC: 4.5 MMOL/L
POTASSIUM SERPL-SCNC: 5.3 MMOL/L
PROT SERPL-MCNC: 7.4 G/DL
PROT SERPL-MCNC: 7.9 G/DL
PROT SERPL-MCNC: 7.9 G/DL
PROT SERPL-MCNC: 8.1 G/DL
PROT SERPL-MCNC: 8.2 G/DL
SODIUM SERPL-SCNC: 134 MMOL/L
SODIUM SERPL-SCNC: 135 MMOL/L
SODIUM SERPL-SCNC: 138 MMOL/L
SODIUM SERPL-SCNC: 138 MMOL/L
SODIUM SERPL-SCNC: 140 MMOL/L

## 2024-05-18 RX ORDER — OMEGA-3/DHA/EPA/FISH OIL 300-1000MG
400 CAPSULE ORAL DAILY
Qty: 5 | Refills: 0 | Status: ACTIVE | COMMUNITY
Start: 2024-05-18 | End: 1900-01-01

## 2024-05-20 ENCOUNTER — APPOINTMENT (OUTPATIENT)
Age: 59
End: 2024-05-20

## 2024-05-20 ENCOUNTER — RESULT REVIEW (OUTPATIENT)
Age: 59
End: 2024-05-20

## 2024-05-20 PROCEDURE — 77387B: CUSTOM | Mod: 26

## 2024-05-20 PROCEDURE — 77427 RADIATION TX MANAGEMENT X5: CPT

## 2024-05-20 RX ORDER — SUCRALFATE 1 G/10ML
1 SUSPENSION ORAL 4 TIMES DAILY
Qty: 1 | Refills: 9 | Status: ACTIVE | COMMUNITY
Start: 2024-05-20 | End: 1900-01-01

## 2024-05-20 NOTE — REVIEW OF SYSTEMS
Patient resting in bed and chair throughout night.  A&Ox3, VSS, denies any SOB.  Patient had complaints of pain, prn pain medication given and effective.  Patient to d/c home today with RADHA drain.  Will continue to monitor.     [Patient Intake Form Reviewed] : Patient intake form was reviewed [Recent Change In Weight] : ~T recent weight change [Fever] : no fever [Eye Pain] : no eye pain [Vision Problems] : no vision problems [Dysphagia] : no dysphagia [Loss of Hearing] : no loss of hearing [Hoarseness] : no hoarseness [Odynophagia] : no odynophagia [Chest Pain] : no chest pain [Lower Ext Edema] : no lower extremity edema [Shortness Of Breath] : no shortness of breath [Cough] : no cough [SOB on Exertion] : no shortness of breath during exertion [Abdominal Pain] : no abdominal pain [Vomiting] : no vomiting [Constipation] : no constipation [Dysuria] : no dysuria [Joint Pain] : no joint pain [Skin Rash] : no skin rash [Easy Bleeding] : no tendency for easy bleeding [Easy Bruising] : no tendency for easy bruising [Swollen Glands] : no swollen glands

## 2024-05-20 NOTE — HISTORY OF PRESENT ILLNESS
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  He sees ENT, Dr. Ibanez for chronic nasopharyngitis. CT on 12/21/23 at  showed soft tissue thickening in nasopharynx and cervical lymph nodes. Pt had a left neck level II FNA on 1/5/24 which showed metastatic nasopharyngeal carcinoma.  MRI at  on 1/30/24 showed metastatic adenopathy involving jugular chains bilaterally with bulky L palatine tonsil.  He saw Dr. Avila Diaz on 2/12/24 and is scheduled to see Dr. Anisha roberts week.  He is scheduled for PET scan on 02/26/24.  He feels ok and is eating a normal diet currently.   [de-identified] : Patient presents on C2D5 CRT with weekly Cisplatin for metastatic nasopharyngeal carcinoma.  He is s/p just one cycle of induction chemotherapy with gemcitabine and cisplatin which was discontinued for significant hematologic toxicity, thrombocytopenia to 22 requiring PLT transfusion as well as neutropenia with a WBC of 0.8. + Intermittent LUE pain/neuropathy, seems like cervical radiculopathy. + Constipation. + Mild odynophagia, no dysphagia. + Xerostomia. + Mild tinnitus, R ear only. No N/V. No erythema around neck. No thick oral secretions.

## 2024-05-20 NOTE — ASSESSMENT
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  It is EBV positive on path report.  -Dr Fischer discussed treatment of localized nasopharyngeal carcinoma with induction chemo therapy followed by chemoRT.  -PET from 02/26/24 shows FDG-avid soft tissue thickening in nasopharynx corresponds to known malignancy. Mild, symmetric hypermetabolism in the palatine tonsils is nonspecific. Further evaluation may be performed with direct visualization.  FDG-avid bilateral level II, left level III, and left retropharyngeal lymph node metastases.  FDG-avid consolidation at left lung base is indeterminate. Differential diagnosis includes infectious, inflammatory, and neoplastic etiologies. Increased FDG uptake within the iliopsoas anterior to the right femoral head and adjacent to the gluteus medius insertion on the right greater trochanter, likely inflammatory. -Given significant bilateral mari metastases, initial plan was for 3 cycles of induction chemotherapy with gemcitabine and cisplatin followed by chemoRT with cisplatin. -unfortunately he had significant hematologic toxicity associated with cycle 1 with thrombocytopenia to 22 requiring PLT transfusion as well as neutropenia with a WBC of 0.8.   -Given the signficant hematologic toxicity, Dr. Fischer recommended that we transition to chemoRT as continued induction may hinder his definitive treatment w CRT.   -received neulasta x 1 and started CRT with weekly Cisplatin on 5/6/24 -today is C2D5, expected side effects all mild so far -referred to Dr. Perez for what sounds like cervical radiculopathy  -RTC Qday for RT, Qweek for Cisplatin and Q10-14 days for MD/PA follow up

## 2024-05-20 NOTE — REASON FOR VISIT
[Follow-Up Visit] : a follow-up [Initial Consultation] : an initial consultation [FreeTextEntry2] : nasopharyngeal carcinoma

## 2024-05-20 NOTE — PHYSICAL EXAM
[Fully active, able to carry on all pre-disease performance without restriction] : Status 0 - Fully active, able to carry on all pre-disease performance without restriction [Normal] : affect appropriate [de-identified] : cervical lymphadenopathy

## 2024-05-21 ENCOUNTER — RESULT REVIEW (OUTPATIENT)
Age: 59
End: 2024-05-21

## 2024-05-21 ENCOUNTER — APPOINTMENT (OUTPATIENT)
Dept: HEMATOLOGY ONCOLOGY | Facility: CLINIC | Age: 59
End: 2024-05-21

## 2024-05-21 ENCOUNTER — APPOINTMENT (OUTPATIENT)
Age: 59
End: 2024-05-21

## 2024-05-21 LAB
ALBUMIN SERPL ELPH-MCNC: 3.9 G/DL — SIGNIFICANT CHANGE UP (ref 3.3–5)
ALP SERPL-CCNC: 81 U/L — SIGNIFICANT CHANGE UP (ref 40–120)
ALT FLD-CCNC: 15 U/L — SIGNIFICANT CHANGE UP (ref 10–45)
ANION GAP SERPL CALC-SCNC: 14 MMOL/L — SIGNIFICANT CHANGE UP (ref 5–17)
AST SERPL-CCNC: 30 U/L — SIGNIFICANT CHANGE UP (ref 10–40)
BILIRUB SERPL-MCNC: 0.4 MG/DL — SIGNIFICANT CHANGE UP (ref 0.2–1.2)
BUN SERPL-MCNC: 15 MG/DL — SIGNIFICANT CHANGE UP (ref 7–23)
CALCIUM SERPL-MCNC: 9.3 MG/DL — SIGNIFICANT CHANGE UP (ref 8.4–10.5)
CHLORIDE SERPL-SCNC: 93 MMOL/L — LOW (ref 96–108)
CO2 SERPL-SCNC: 26 MMOL/L — SIGNIFICANT CHANGE UP (ref 22–31)
CREAT SERPL-MCNC: 0.63 MG/DL — SIGNIFICANT CHANGE UP (ref 0.5–1.3)
EGFR: 111 ML/MIN/1.73M2 — SIGNIFICANT CHANGE UP
GLUCOSE SERPL-MCNC: 193 MG/DL — HIGH (ref 70–99)
POTASSIUM SERPL-MCNC: 4.5 MMOL/L — SIGNIFICANT CHANGE UP (ref 3.5–5.3)
POTASSIUM SERPL-SCNC: 4.5 MMOL/L — SIGNIFICANT CHANGE UP (ref 3.5–5.3)
PROT SERPL-MCNC: 8.3 G/DL — SIGNIFICANT CHANGE UP (ref 6–8.3)
SODIUM SERPL-SCNC: 133 MMOL/L — LOW (ref 135–145)

## 2024-05-21 PROCEDURE — 77387B: CUSTOM | Mod: 26

## 2024-05-22 ENCOUNTER — NON-APPOINTMENT (OUTPATIENT)
Age: 59
End: 2024-05-22

## 2024-05-22 VITALS
HEIGHT: 69 IN | BODY MASS INDEX: 31.7 KG/M2 | WEIGHT: 214 LBS | OXYGEN SATURATION: 99 % | RESPIRATION RATE: 16 BRPM | TEMPERATURE: 97.4 F | SYSTOLIC BLOOD PRESSURE: 133 MMHG | HEART RATE: 98 BPM | DIASTOLIC BLOOD PRESSURE: 92 MMHG

## 2024-05-22 LAB
ALBUMIN SERPL ELPH-MCNC: 3.8 G/DL — SIGNIFICANT CHANGE UP (ref 3.3–5)
ALP SERPL-CCNC: 72 U/L — SIGNIFICANT CHANGE UP (ref 40–120)
ALT FLD-CCNC: 12 U/L — SIGNIFICANT CHANGE UP (ref 10–45)
ANION GAP SERPL CALC-SCNC: 12 MMOL/L — SIGNIFICANT CHANGE UP (ref 5–17)
AST SERPL-CCNC: 14 U/L — SIGNIFICANT CHANGE UP (ref 10–40)
BILIRUB SERPL-MCNC: 0.2 MG/DL — SIGNIFICANT CHANGE UP (ref 0.2–1.2)
BUN SERPL-MCNC: 24 MG/DL — HIGH (ref 7–23)
CALCIUM SERPL-MCNC: 9.1 MG/DL — SIGNIFICANT CHANGE UP (ref 8.4–10.5)
CHLORIDE SERPL-SCNC: 100 MMOL/L — SIGNIFICANT CHANGE UP (ref 96–108)
CO2 SERPL-SCNC: 26 MMOL/L — SIGNIFICANT CHANGE UP (ref 22–31)
CREAT SERPL-MCNC: 0.7 MG/DL — SIGNIFICANT CHANGE UP (ref 0.5–1.3)
EGFR: 107 ML/MIN/1.73M2 — SIGNIFICANT CHANGE UP
GLUCOSE SERPL-MCNC: 179 MG/DL — HIGH (ref 70–99)
MAGNESIUM SERPL-MCNC: 1.5 MG/DL — LOW (ref 1.6–2.6)
POTASSIUM SERPL-MCNC: 4.8 MMOL/L — SIGNIFICANT CHANGE UP (ref 3.5–5.3)
POTASSIUM SERPL-SCNC: 4.8 MMOL/L — SIGNIFICANT CHANGE UP (ref 3.5–5.3)
PROT SERPL-MCNC: 7.8 G/DL — SIGNIFICANT CHANGE UP (ref 6–8.3)
SODIUM SERPL-SCNC: 138 MMOL/L — SIGNIFICANT CHANGE UP (ref 135–145)

## 2024-05-22 PROCEDURE — 77387B: CUSTOM | Mod: 26

## 2024-05-22 RX ORDER — TRAMADOL HYDROCHLORIDE 50 MG/1
50 TABLET, COATED ORAL
Qty: 120 | Refills: 0 | Status: ACTIVE | COMMUNITY
Start: 2024-04-18 | End: 1900-01-01

## 2024-05-22 NOTE — PHYSICAL EXAM
[Sclera] : the sclera and conjunctiva were normal [Extraocular Movements] : extraocular movements were intact [Outer Ear] : the ears and nose were normal in appearance [Normal Oral Cavity] : oral cavity was normal [Normal] : the ears, nose and oropharynx were normal in appearance [de-identified] : erythema of the posterior opx; no oral sores

## 2024-05-22 NOTE — DISEASE MANAGEMENT
[Clinical] : TNM Stage: c [III] : III [FreeTextEntry4] : NPC [TTNM] : 1 [NTNM] : 2 [MTNM] : 0 [de-identified] : 5478 [de-identified] : 9700 [de-identified] : nasopharynx

## 2024-05-22 NOTE — VITALS
[Maximal Pain Intensity: 1/10] : 1/10 [Least Pain Intensity: 1/10] : 1/10 [Pain Description/Quality: ___] : Pain description/quality: [unfilled] [Pain Duration: ___] : Pain duration: [unfilled] [Pain Location: ___] : Pain Location: [unfilled] [NoTreatment Scheduled] : no treatment scheduled [Maximal Pain Intensity: 10/10] : 10/10 [Least Pain Intensity: 0/10] : 0/10 [Adjuvant (neuroleptics)] : adjuvant (neuroleptics) [Pain Interferes with ADLs] : Pain does not interfere with activities of daily living [80: Normal activity with effort; some signs or symptoms of disease.] : 80: Normal activity with effort; some signs or symptoms of disease.

## 2024-05-23 PROCEDURE — 77014: CPT | Mod: 26

## 2024-05-24 ENCOUNTER — RESULT REVIEW (OUTPATIENT)
Age: 59
End: 2024-05-24

## 2024-05-24 ENCOUNTER — APPOINTMENT (OUTPATIENT)
Dept: HEMATOLOGY ONCOLOGY | Facility: CLINIC | Age: 59
End: 2024-05-24

## 2024-05-24 LAB
BASOPHILS # BLD AUTO: 0 K/UL — SIGNIFICANT CHANGE UP (ref 0–0.2)
BASOPHILS NFR BLD AUTO: 0.2 % — SIGNIFICANT CHANGE UP (ref 0–2)
EOSINOPHIL # BLD AUTO: 0.1 K/UL — SIGNIFICANT CHANGE UP (ref 0–0.5)
EOSINOPHIL NFR BLD AUTO: 1.1 % — SIGNIFICANT CHANGE UP (ref 0–6)
HCT VFR BLD CALC: 30 % — LOW (ref 39–50)
HGB BLD-MCNC: 10.2 G/DL — LOW (ref 13–17)
LYMPHOCYTES # BLD AUTO: 0.4 K/UL — LOW (ref 1–3.3)
LYMPHOCYTES # BLD AUTO: 7.9 % — LOW (ref 13–44)
MCHC RBC-ENTMCNC: 31.4 PG — SIGNIFICANT CHANGE UP (ref 27–34)
MCHC RBC-ENTMCNC: 33.9 G/DL — SIGNIFICANT CHANGE UP (ref 32–36)
MCV RBC AUTO: 92.6 FL — SIGNIFICANT CHANGE UP (ref 80–100)
MONOCYTES # BLD AUTO: 0.3 K/UL — SIGNIFICANT CHANGE UP (ref 0–0.9)
MONOCYTES NFR BLD AUTO: 7.2 % — SIGNIFICANT CHANGE UP (ref 2–14)
NEUTROPHILS # BLD AUTO: 4.1 K/UL — SIGNIFICANT CHANGE UP (ref 1.8–7.4)
NEUTROPHILS NFR BLD AUTO: 83.5 % — HIGH (ref 43–77)
PLATELET # BLD AUTO: 133 K/UL — LOW (ref 150–400)
RBC # BLD: 3.25 M/UL — LOW (ref 4.2–5.8)
RBC # FLD: 16.1 % — HIGH (ref 10.3–14.5)
WBC # BLD: 4.9 K/UL — SIGNIFICANT CHANGE UP (ref 3.8–10.5)
WBC # FLD AUTO: 4.9 K/UL — SIGNIFICANT CHANGE UP (ref 3.8–10.5)

## 2024-05-24 PROCEDURE — 77387B: CUSTOM | Mod: 26

## 2024-05-28 ENCOUNTER — APPOINTMENT (OUTPATIENT)
Age: 59
End: 2024-05-28

## 2024-05-28 ENCOUNTER — APPOINTMENT (OUTPATIENT)
Dept: PHYSICAL MEDICINE AND REHAB | Facility: CLINIC | Age: 59
End: 2024-05-28

## 2024-05-28 DIAGNOSIS — B37.0 CANDIDAL STOMATITIS: ICD-10-CM

## 2024-05-28 PROCEDURE — 77387B: CUSTOM | Mod: 26

## 2024-05-28 PROCEDURE — 77427 RADIATION TX MANAGEMENT X5: CPT

## 2024-05-28 RX ORDER — NYSTATIN 100000 [USP'U]/ML
100000 SUSPENSION ORAL 4 TIMES DAILY
Qty: 1 | Refills: 0 | Status: ACTIVE | COMMUNITY
Start: 2024-05-28 | End: 1900-01-01

## 2024-05-28 RX ORDER — GABAPENTIN 300 MG/1
300 CAPSULE ORAL TWICE DAILY
Qty: 60 | Refills: 0 | Status: ACTIVE | COMMUNITY
Start: 2024-05-28 | End: 1900-01-01

## 2024-05-29 LAB
ALBUMIN SERPL ELPH-MCNC: 3.9 G/DL
ALP BLD-CCNC: 100 U/L
ALT SERPL-CCNC: 12 U/L
ANION GAP SERPL CALC-SCNC: 13 MMOL/L
AST SERPL-CCNC: 10 U/L
BILIRUB SERPL-MCNC: 0.2 MG/DL
BUN SERPL-MCNC: 14 MG/DL
CALCIUM SERPL-MCNC: 9 MG/DL
CHLORIDE SERPL-SCNC: 98 MMOL/L
CO2 SERPL-SCNC: 26 MMOL/L
CREAT SERPL-MCNC: 0.6 MG/DL
EGFR: 113 ML/MIN/1.73M2
GLUCOSE SERPL-MCNC: 190 MG/DL
MAGNESIUM SERPL-MCNC: 1.1 MG/DL
POTASSIUM SERPL-SCNC: 5 MMOL/L
PROT SERPL-MCNC: 7.6 G/DL
SODIUM SERPL-SCNC: 137 MMOL/L

## 2024-05-29 PROCEDURE — 77387B: CUSTOM | Mod: 26

## 2024-05-30 ENCOUNTER — NON-APPOINTMENT (OUTPATIENT)
Age: 59
End: 2024-05-30

## 2024-05-30 VITALS
BODY MASS INDEX: 31.45 KG/M2 | DIASTOLIC BLOOD PRESSURE: 78 MMHG | OXYGEN SATURATION: 100 % | HEART RATE: 98 BPM | WEIGHT: 213 LBS | RESPIRATION RATE: 16 BRPM | SYSTOLIC BLOOD PRESSURE: 128 MMHG

## 2024-05-30 PROCEDURE — 77387B: CUSTOM | Mod: 26

## 2024-05-30 NOTE — HISTORY OF PRESENT ILLNESS
[FreeTextEntry1] : Mr. Jenkins is a 58-year-old male with history of DVT on Eliquis and metastatic nasopharyngeal carcinoma (NPC, EBV positive). Started on induction chemotherapy but had severe hematologic side effects now undergoing chemoradiation to nasopharynx with weekly Cisplatin.  He was referred by Dr. Fischer for neck pain possibly secondary to cervical radiculopathy.   He has left arm/shoulder pain and he is on tramadol 50 mg QID PRN. He was previously on Gabapentin 300 mg TID which was _____________. He is on Olanzapine 5 mg daily PRN nausea and Zofran 8 mg q8hr PRN nausea.

## 2024-05-30 NOTE — ASSESSMENT
[FreeTextEntry1] : 58-year-old male here for evaluation.  #Neck pain -Multifactorial including_________________ -Reviewed radiation oncology:  Planned Dose: 7000. Treatment Site: nasopharynx -Reviewed hematology oncology: weekly cisplatin -Reviewed ENT: s/p left neck Level II FNA on 1/5/24 which showed metastatic nasopharyngeal carcinoma   #  -Follow-up in____________

## 2024-05-30 NOTE — PHYSICAL EXAM
[Sclera] : the sclera and conjunctiva were normal [Extraocular Movements] : extraocular movements were intact [Normal] : oriented to person, place and time, the affect was normal, the mood was normal and not anxious [de-identified] : moderate erythema of the posterior opx; no thrush.

## 2024-05-30 NOTE — DISEASE MANAGEMENT
[Clinical] : TNM Stage: c [III] : III [FreeTextEntry4] : NPC [TTNM] : 1 [NTNM] : 2 [MTNM] : 0 [de-identified] : 0352 [de-identified] : 5041 [de-identified] : nasopharynx

## 2024-05-30 NOTE — DATA REVIEWED
[FreeTextEntry1] : May 2024 labs: AST (SGOT) 10 U/L	 	 ALT(SGPT) 12 U/L	 	 eGFR 113 mL/min/1.73m2 WBC 4.9	 HGB 10.2	 	 NEUT# 4.1  March 2024 CT chest: Interval decrease in size of left basilar consolidative opacity with residual 1.2 x 1 cm nodular opacity remaining.  Recommend CT chest follow-up in 3 months to ensure complete clearing.  February 2024 PET: 1. FDG-avid soft tissue thickening in nasopharynx corresponds to known malignancy. Mild, symmetric hypermetabolism in the palatine tonsils is nonspecific. Further evaluation may be performed with direct visualization.  2. FDG-avid bilateral level II, left level III, and left retropharyngeal lymph node metastases.  3. FDG-avid consolidation at left lung base is indeterminate. Differential diagnosis includes infectious, inflammatory, and neoplastic etiologies. A 1 month follow-up with dedicated CT of chest is recommended for further evaluation.  4. Increased FDG uptake within the iliopsoas anterior to the right femoral head and adjacent to the gluteus medius insertion on the right greater trochanter, likely inflammatory.  5. Focus of mildly increased radiotracer activity in right molar region of the maxilla likely is related to dental disease. Please correlate clinically.  January 2024 MRI orbit face with IV contrast: Metastatic adenopathy involving the jugular chains bilaterally unchanged with a bulky left palatine tonsil with no deep infiltrating mass. Possibility of a superficial low volume palatine malignancy is not excluded.

## 2024-05-30 NOTE — PHYSICAL EXAM
[FreeTextEntry1] : Gen: Patient is A&O x 3, NAD HEENT: EOMI, hearing grossly normal Resp: regular, non - labored CV: pulses regular Skin: no rashes, erythema Lymph: no clubbing, cyanosis, edema Inspection: no instability ROM: full throughout Palpation:no tenderness to palpation Sensation: intact to light touch Reflexes: 1+ and symmetric throughout Strength: 5/5 throughout Special tests: _ Gait: normal, non-antalgic

## 2024-05-30 NOTE — VITALS
[Maximal Pain Intensity: 3/10] : 3/10 [Least Pain Intensity: 1/10] : 1/10 [Pain Description/Quality: ___] : Pain description/quality: [unfilled] [Pain Duration: ___] : Pain duration: [unfilled] [Pain Location: ___] : Pain Location: [unfilled] [Pain Interferes with ADLs] : Pain interferes with activities of daily living. [NoTreatment Scheduled] : no treatment scheduled [Adjuvant (neuroleptics)] : adjuvant (neuroleptics) [80: Normal activity with effort; some signs or symptoms of disease.] : 80: Normal activity with effort; some signs or symptoms of disease.

## 2024-05-30 NOTE — REVIEW OF SYSTEMS
[Dysphagia: Grade 0] : Dysphagia: Grade 0 [Edema Limbs: Grade 0] : Edema Limbs: Grade 0  [Fatigue: Grade 0] : Fatigue: Grade 0 [Localized Edema: Grade 0] : Localized Edema: Grade 0  [Neck Edema: Grade 0] : Neck Edema: Grade 0 [Xerostomia: Grade 1 - Symptomatic (e.g., dry or thick saliva) without significant dietary alteration; unstimulated saliva flow >0.2 ml/min] : Xerostomia: Grade 1 - Symptomatic (e.g., dry or thick saliva) without significant dietary alteration; unstimulated saliva flow >0.2 ml/min [Dysgeusia: Grade 0] : Dysgeusia: Grade 0 [Dyspnea: Grade 0] : Dyspnea: Grade 0 [Dermatitis Radiation: Grade 0] : Dermatitis Radiation: Grade 0 [Skin Hyperpigmentation: Grade 0] : Skin Hyperpigmentation: Grade 0

## 2024-05-31 ENCOUNTER — RESULT REVIEW (OUTPATIENT)
Age: 59
End: 2024-05-31

## 2024-05-31 ENCOUNTER — APPOINTMENT (OUTPATIENT)
Dept: HEMATOLOGY ONCOLOGY | Facility: CLINIC | Age: 59
End: 2024-05-31
Payer: MEDICAID

## 2024-05-31 VITALS
WEIGHT: 207.06 LBS | HEART RATE: 97 BPM | OXYGEN SATURATION: 100 % | HEIGHT: 69 IN | DIASTOLIC BLOOD PRESSURE: 82 MMHG | BODY MASS INDEX: 30.67 KG/M2 | SYSTOLIC BLOOD PRESSURE: 127 MMHG

## 2024-05-31 LAB
BASOPHILS # BLD AUTO: 0 K/UL — SIGNIFICANT CHANGE UP (ref 0–0.2)
BASOPHILS NFR BLD AUTO: 0.1 % — SIGNIFICANT CHANGE UP (ref 0–2)
EOSINOPHIL # BLD AUTO: 0 K/UL — SIGNIFICANT CHANGE UP (ref 0–0.5)
EOSINOPHIL NFR BLD AUTO: 0.6 % — SIGNIFICANT CHANGE UP (ref 0–6)
HCT VFR BLD CALC: 29.1 % — LOW (ref 39–50)
HGB BLD-MCNC: 9.9 G/DL — LOW (ref 13–17)
LYMPHOCYTES # BLD AUTO: 0.3 K/UL — LOW (ref 1–3.3)
LYMPHOCYTES # BLD AUTO: 6.6 % — LOW (ref 13–44)
MCHC RBC-ENTMCNC: 31.8 PG — SIGNIFICANT CHANGE UP (ref 27–34)
MCHC RBC-ENTMCNC: 34.1 G/DL — SIGNIFICANT CHANGE UP (ref 32–36)
MCV RBC AUTO: 93.4 FL — SIGNIFICANT CHANGE UP (ref 80–100)
MONOCYTES # BLD AUTO: 0.3 K/UL — SIGNIFICANT CHANGE UP (ref 0–0.9)
MONOCYTES NFR BLD AUTO: 7.2 % — SIGNIFICANT CHANGE UP (ref 2–14)
NEUTROPHILS # BLD AUTO: 3.7 K/UL — SIGNIFICANT CHANGE UP (ref 1.8–7.4)
NEUTROPHILS NFR BLD AUTO: 85.4 % — HIGH (ref 43–77)
PLATELET # BLD AUTO: 87 K/UL — LOW (ref 150–400)
RBC # BLD: 3.11 M/UL — LOW (ref 4.2–5.8)
RBC # FLD: 16.4 % — HIGH (ref 10.3–14.5)
WBC # BLD: 4.3 K/UL — SIGNIFICANT CHANGE UP (ref 3.8–10.5)
WBC # FLD AUTO: 4.3 K/UL — SIGNIFICANT CHANGE UP (ref 3.8–10.5)

## 2024-05-31 PROCEDURE — 77387B: CUSTOM | Mod: 26

## 2024-05-31 PROCEDURE — 99214 OFFICE O/P EST MOD 30 MIN: CPT

## 2024-05-31 NOTE — HISTORY OF PRESENT ILLNESS
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  He sees ENT, Dr. Ibanez for chronic nasopharyngitis. CT on 12/21/23 at  showed soft tissue thickening in nasopharynx and cervical lymph nodes. Pt had a left neck level II FNA on 1/5/24 which showed metastatic nasopharyngeal carcinoma.  MRI at  on 1/30/24 showed metastatic adenopathy involving jugular chains bilaterally with bulky L palatine tonsil.  He saw Dr. Avila Diaz on 2/12/24 and is scheduled to see Dr. Anisha roberts week.  He is scheduled for PET scan on 02/26/24.  He feels ok and is eating a normal diet currently.   [de-identified] : Patient presents on C2D5 CRT with weekly Cisplatin for metastatic nasopharyngeal carcinoma.  He is s/p just one cycle of induction chemotherapy with gemcitabine and cisplatin which was discontinued for significant hematologic toxicity, thrombocytopenia to 22 requiring PLT transfusion as well as neutropenia with a WBC of 0.8. + Intermittent LUE pain/neuropathy, seems like cervical radiculopathy. + Constipation. + Mild odynophagia, no dysphagia. + Xerostomia. + Mild tinnitus, R ear only. No N/V. No erythema around neck. No thick oral secretions.   05/31/24: Mr Jenkins returns for follow up.  He is tolerating CRT relatively well and though he has decreased appetite due to taste, he is taking shakes and maintaining his weight. Odynophagia has improved with lidocaine and carafate and gabapentin.  Continue cisplatin w RT, follow up in one week [facility]  [facility] OP [address]  Scheduled Appointment: 12/27/2022    Maureen Bass  [facility]  VASCULAR [address]  Scheduled Appointment: 01/09/2023    Eliane Stewart  [facility]  HE[facility] [address]  Scheduled Appointment: 01/12/2023

## 2024-05-31 NOTE — PHYSICAL EXAM
[Fully active, able to carry on all pre-disease performance without restriction] : Status 0 - Fully active, able to carry on all pre-disease performance without restriction [Normal] : affect appropriate [de-identified] : cervical lymphadenopathy

## 2024-05-31 NOTE — ASSESSMENT
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  It is EBV positive on path report.  -Dr Fischer discussed treatment of localized nasopharyngeal carcinoma with induction chemo therapy followed by chemoRT.  -PET from 02/26/24 shows FDG-avid soft tissue thickening in nasopharynx corresponds to known malignancy. Mild, symmetric hypermetabolism in the palatine tonsils is nonspecific. Further evaluation may be performed with direct visualization.  FDG-avid bilateral level II, left level III, and left retropharyngeal lymph node metastases.  FDG-avid consolidation at left lung base is indeterminate. Differential diagnosis includes infectious, inflammatory, and neoplastic etiologies. Increased FDG uptake within the iliopsoas anterior to the right femoral head and adjacent to the gluteus medius insertion on the right greater trochanter, likely inflammatory. -Given significant bilateral mari metastases, initial plan was for 3 cycles of induction chemotherapy with gemcitabine and cisplatin followed by chemoRT with cisplatin. -unfortunately he had significant hematologic toxicity associated with cycle 1 with thrombocytopenia to 22 requiring PLT transfusion as well as neutropenia with a WBC of 0.8.   -Given the signficant hematologic toxicity, Dr. Fischer recommended that we transition to chemoRT as continued induction may hinder his definitive treatment w CRT.   -received neulasta x 1 and started CRT with weekly Cisplatin on 5/6/24 -today is C2D5, expected side effects all mild so far -referred to Dr. Perez for what sounds like cervical radiculopathy  05/31/24: Mr Jenkins returns for follow up.  He is tolerating CRT relatively well and though he has decreased appetite due to taste, he is taking shakes and maintaining his weight. Odynophagia has improved with lidocaine and carafate and gabapentin.  Continue cisplatin w RT, follow up in one week

## 2024-06-03 ENCOUNTER — APPOINTMENT (OUTPATIENT)
Age: 59
End: 2024-06-03

## 2024-06-03 ENCOUNTER — RESULT REVIEW (OUTPATIENT)
Age: 59
End: 2024-06-03

## 2024-06-03 ENCOUNTER — APPOINTMENT (OUTPATIENT)
Dept: OTOLARYNGOLOGY | Facility: CLINIC | Age: 59
End: 2024-06-03

## 2024-06-03 LAB
BASOPHILS # BLD AUTO: 0 K/UL — SIGNIFICANT CHANGE UP (ref 0–0.2)
BASOPHILS NFR BLD AUTO: 0.3 % — SIGNIFICANT CHANGE UP (ref 0–2)
EOSINOPHIL # BLD AUTO: 0 K/UL — SIGNIFICANT CHANGE UP (ref 0–0.5)
EOSINOPHIL NFR BLD AUTO: 0.9 % — SIGNIFICANT CHANGE UP (ref 0–6)
HCT VFR BLD CALC: 27.2 % — LOW (ref 39–50)
HGB BLD-MCNC: 9.5 G/DL — LOW (ref 13–17)
LYMPHOCYTES # BLD AUTO: 0.3 K/UL — LOW (ref 1–3.3)
LYMPHOCYTES # BLD AUTO: 6.1 % — LOW (ref 13–44)
MCHC RBC-ENTMCNC: 31.6 PG — SIGNIFICANT CHANGE UP (ref 27–34)
MCHC RBC-ENTMCNC: 34.9 G/DL — SIGNIFICANT CHANGE UP (ref 32–36)
MCV RBC AUTO: 90.6 FL — SIGNIFICANT CHANGE UP (ref 80–100)
MONOCYTES # BLD AUTO: 0.4 K/UL — SIGNIFICANT CHANGE UP (ref 0–0.9)
MONOCYTES NFR BLD AUTO: 8.7 % — SIGNIFICANT CHANGE UP (ref 2–14)
NEUTROPHILS # BLD AUTO: 3.6 K/UL — SIGNIFICANT CHANGE UP (ref 1.8–7.4)
NEUTROPHILS NFR BLD AUTO: 84 % — HIGH (ref 43–77)
PLATELET # BLD AUTO: 80 K/UL — LOW (ref 150–400)
RBC # BLD: 3 M/UL — LOW (ref 4.2–5.8)
RBC # FLD: 14.7 % — HIGH (ref 10.3–14.5)
WBC # BLD: 4.3 K/UL — SIGNIFICANT CHANGE UP (ref 3.8–10.5)
WBC # FLD AUTO: 4.3 K/UL — SIGNIFICANT CHANGE UP (ref 3.8–10.5)

## 2024-06-03 PROCEDURE — 77387B: CUSTOM | Mod: 26

## 2024-06-03 NOTE — HISTORY OF PRESENT ILLNESS
[de-identified] : 59 y/o M History of DVT, on Eliquis  presents for follow up NPC - mid tx MRI at  on 1/30/24 showed metastatic adenopathy involving jugular chains bilaterally with bulky L palatine tonsil PET/CT FDG done 02/26/24  Left neck Level II FNA on 1/5/24 which showed metastatic nasopharyngeal carcinoma.  PT is on chemoRT with Dr. maza and Dr. Frey. pt is tolerated well   Pt is eating well, denies pain and SOB.    Denies unintentional weight loss Reports anterior rhinorrhea starting last night. No fevers reported.   Pt smokes 1/2 ppd for 15 years -- reports smoking less that 1/2 ppd today. Former drinker.

## 2024-06-03 NOTE — DATA REVIEWED
[de-identified] : PET/CT Neck independently interpreted - findings of NPC with bilateral LAD, avid left lung consolidation.

## 2024-06-03 NOTE — PHYSICAL EXAM
[Nasal Endoscopy Performed] : nasal endoscopy was performed, see procedure section for findings [Normal] : no rashes [de-identified] : Approx. 3 cm L. level II LN, firm, mobile, no TTP. [FreeTextEntry1] : No concerning lesions in the OC/OPx on inspection or palpation.

## 2024-06-03 NOTE — PROCEDURE
[None] : none [Flexible Endoscope] : examined with the flexible endoscope [Serial Number: ___] : Serial Number: [unfilled] [FreeTextEntry6] : Fullness in the NPx bilaterally consistent with primary lesion in the NPx.  No lesions in the NC, no drainage. [de-identified] : NPC

## 2024-06-04 ENCOUNTER — NON-APPOINTMENT (OUTPATIENT)
Age: 59
End: 2024-06-04

## 2024-06-04 VITALS
OXYGEN SATURATION: 96 % | TEMPERATURE: 97.3 F | WEIGHT: 206 LBS | BODY MASS INDEX: 30.51 KG/M2 | RESPIRATION RATE: 16 BRPM | HEIGHT: 69 IN | SYSTOLIC BLOOD PRESSURE: 132 MMHG | HEART RATE: 123 BPM | DIASTOLIC BLOOD PRESSURE: 85 MMHG

## 2024-06-04 LAB
ALBUMIN SERPL ELPH-MCNC: 3.8 G/DL — SIGNIFICANT CHANGE UP (ref 3.3–5)
ALP SERPL-CCNC: 75 U/L — SIGNIFICANT CHANGE UP (ref 40–120)
ALT FLD-CCNC: 11 U/L — SIGNIFICANT CHANGE UP (ref 10–45)
ANION GAP SERPL CALC-SCNC: 12 MMOL/L — SIGNIFICANT CHANGE UP (ref 5–17)
AST SERPL-CCNC: 17 U/L — SIGNIFICANT CHANGE UP (ref 10–40)
BILIRUB SERPL-MCNC: 0.3 MG/DL — SIGNIFICANT CHANGE UP (ref 0.2–1.2)
BUN SERPL-MCNC: 24 MG/DL — HIGH (ref 7–23)
CALCIUM SERPL-MCNC: 9.3 MG/DL — SIGNIFICANT CHANGE UP (ref 8.4–10.5)
CHLORIDE SERPL-SCNC: 93 MMOL/L — LOW (ref 96–108)
CO2 SERPL-SCNC: 28 MMOL/L — SIGNIFICANT CHANGE UP (ref 22–31)
CREAT SERPL-MCNC: 0.69 MG/DL — SIGNIFICANT CHANGE UP (ref 0.5–1.3)
EGFR: 108 ML/MIN/1.73M2 — SIGNIFICANT CHANGE UP
GLUCOSE SERPL-MCNC: 168 MG/DL — HIGH (ref 70–99)
MAGNESIUM SERPL-MCNC: 1 MG/DL — CRITICAL LOW (ref 1.6–2.6)
POTASSIUM SERPL-MCNC: 4.4 MMOL/L — SIGNIFICANT CHANGE UP (ref 3.5–5.3)
POTASSIUM SERPL-SCNC: 4.4 MMOL/L — SIGNIFICANT CHANGE UP (ref 3.5–5.3)
PROT SERPL-MCNC: 8 G/DL — SIGNIFICANT CHANGE UP (ref 6–8.3)
SODIUM SERPL-SCNC: 132 MMOL/L — LOW (ref 135–145)

## 2024-06-04 PROCEDURE — 77387B: CUSTOM | Mod: 26

## 2024-06-04 PROCEDURE — 77427 RADIATION TX MANAGEMENT X5: CPT

## 2024-06-04 RX ORDER — SILVER SULFADIAZINE 10 MG/G
1 CREAM TOPICAL
Qty: 1 | Refills: 5 | Status: ACTIVE | COMMUNITY
Start: 2024-06-04 | End: 1900-01-01

## 2024-06-04 NOTE — PHYSICAL EXAM
[Sclera] : the sclera and conjunctiva were normal [Extraocular Movements] : extraocular movements were intact [Heart Sounds] : normal S1 and S2 [Normal] : no palpable adenopathy [de-identified] : tachycardic  [de-identified] : moderate erythema of the posterior opx; no thrush. [de-identified] : grade 1-2 dermatitis

## 2024-06-04 NOTE — REVIEW OF SYSTEMS
[Dysphagia: Grade 0] : Dysphagia: Grade 0 [Edema Limbs: Grade 0] : Edema Limbs: Grade 0  [Fatigue: Grade 0] : Fatigue: Grade 0 [Localized Edema: Grade 0] : Localized Edema: Grade 0  [Neck Edema: Grade 0] : Neck Edema: Grade 0 [Xerostomia: Grade 1 - Symptomatic (e.g., dry or thick saliva) without significant dietary alteration; unstimulated saliva flow >0.2 ml/min] : Xerostomia: Grade 1 - Symptomatic (e.g., dry or thick saliva) without significant dietary alteration; unstimulated saliva flow >0.2 ml/min [Dysgeusia: Grade 0] : Dysgeusia: Grade 0 [Dyspnea: Grade 0] : Dyspnea: Grade 0 [Skin Hyperpigmentation: Grade 0] : Skin Hyperpigmentation: Grade 0 [Dermatitis Radiation: Grade 0] : Dermatitis Radiation: Grade 0 [Skin Hyperpigmentation: Grade 1 - Hyperpigmentation covering <10% BSA; no psychosocial impact] : Skin Hyperpigmentation: Grade 1 - Hyperpigmentation covering <10% BSA; no psychosocial impact [Dermatitis Radiation: Grade 1 - Faint erythema or dry desquamation] : Dermatitis Radiation: Grade 1 - Faint erythema or dry desquamation [Tinnitus - Grade 0] : Tinnitus - Grade 0 [Mucositis Oral: Grade 1 - Asymptomatic or mild symptoms; intervention not indicated] : Mucositis Oral: Grade 1 - Asymptomatic or mild symptoms; intervention not indicated [Oral Pain: Grade 2 - Moderate pain; limiting instrumental ADL] : Oral Pain: Grade 2 - Moderate pain; limiting instrumental ADL [Dysgeusia: Grade 1- Altered taste but no change in diet] : Dysgeusia: Grade 1 - Altered taste but no change in diet

## 2024-06-04 NOTE — DISEASE MANAGEMENT
[Clinical] : TNM Stage: c [III] : III [FreeTextEntry4] : NPC [TTNM] : 1 [NTNM] : 2 [MTNM] : 0 [de-identified] : 8959 [de-identified] : 8616 [de-identified] : nasopharynx

## 2024-06-05 PROCEDURE — 77387B: CUSTOM | Mod: 26

## 2024-06-06 ENCOUNTER — NON-APPOINTMENT (OUTPATIENT)
Age: 59
End: 2024-06-06

## 2024-06-06 PROCEDURE — 77014: CPT | Mod: 26

## 2024-06-07 ENCOUNTER — APPOINTMENT (OUTPATIENT)
Dept: HEMATOLOGY ONCOLOGY | Facility: CLINIC | Age: 59
End: 2024-06-07
Payer: MEDICAID

## 2024-06-07 ENCOUNTER — RESULT REVIEW (OUTPATIENT)
Age: 59
End: 2024-06-07

## 2024-06-07 ENCOUNTER — OUTPATIENT (OUTPATIENT)
Dept: OUTPATIENT SERVICES | Facility: HOSPITAL | Age: 59
LOS: 1 days | End: 2024-06-07
Payer: MEDICAID

## 2024-06-07 VITALS
DIASTOLIC BLOOD PRESSURE: 73 MMHG | WEIGHT: 205.13 LBS | OXYGEN SATURATION: 97 % | HEIGHT: 69 IN | BODY MASS INDEX: 30.38 KG/M2 | SYSTOLIC BLOOD PRESSURE: 103 MMHG | HEART RATE: 100 BPM | TEMPERATURE: 97.6 F

## 2024-06-07 DIAGNOSIS — D64.9 ANEMIA, UNSPECIFIED: ICD-10-CM

## 2024-06-07 LAB
BASOPHILS # BLD AUTO: 0 K/UL — SIGNIFICANT CHANGE UP (ref 0–0.2)
BASOPHILS NFR BLD AUTO: 0.3 % — SIGNIFICANT CHANGE UP (ref 0–2)
BLD GP AB SCN SERPL QL: SIGNIFICANT CHANGE UP
EOSINOPHIL # BLD AUTO: 0 K/UL — SIGNIFICANT CHANGE UP (ref 0–0.5)
EOSINOPHIL NFR BLD AUTO: 0.1 % — SIGNIFICANT CHANGE UP (ref 0–6)
HCT VFR BLD CALC: 27.3 % — LOW (ref 39–50)
HGB BLD-MCNC: 9.3 G/DL — LOW (ref 13–17)
LYMPHOCYTES # BLD AUTO: 0.3 K/UL — LOW (ref 1–3.3)
LYMPHOCYTES # BLD AUTO: 5.7 % — LOW (ref 13–44)
MCHC RBC-ENTMCNC: 31.4 PG — SIGNIFICANT CHANGE UP (ref 27–34)
MCHC RBC-ENTMCNC: 34.2 G/DL — SIGNIFICANT CHANGE UP (ref 32–36)
MCV RBC AUTO: 91.8 FL — SIGNIFICANT CHANGE UP (ref 80–100)
MONOCYTES # BLD AUTO: 0.3 K/UL — SIGNIFICANT CHANGE UP (ref 0–0.9)
MONOCYTES NFR BLD AUTO: 7.3 % — SIGNIFICANT CHANGE UP (ref 2–14)
NEUTROPHILS # BLD AUTO: 3.9 K/UL — SIGNIFICANT CHANGE UP (ref 1.8–7.4)
NEUTROPHILS NFR BLD AUTO: 86.5 % — HIGH (ref 43–77)
PLATELET # BLD AUTO: 63 K/UL — LOW (ref 150–400)
RBC # BLD: 2.97 M/UL — LOW (ref 4.2–5.8)
RBC # FLD: 15.4 % — HIGH (ref 10.3–14.5)
WBC # BLD: 4.5 K/UL — SIGNIFICANT CHANGE UP (ref 3.8–10.5)
WBC # FLD AUTO: 4.5 K/UL — SIGNIFICANT CHANGE UP (ref 3.8–10.5)

## 2024-06-07 PROCEDURE — 99214 OFFICE O/P EST MOD 30 MIN: CPT

## 2024-06-07 PROCEDURE — 77387B: CUSTOM | Mod: 26

## 2024-06-07 NOTE — PHYSICAL EXAM
[Fully active, able to carry on all pre-disease performance without restriction] : Status 0 - Fully active, able to carry on all pre-disease performance without restriction [Normal] : affect appropriate [de-identified] : cervical lymphadenopathy

## 2024-06-07 NOTE — ASSESSMENT
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  It is EBV positive on path report.  -Dr Fischer discussed treatment of localized nasopharyngeal carcinoma with induction chemo therapy followed by chemoRT.  -PET from 02/26/24 shows FDG-avid soft tissue thickening in nasopharynx corresponds to known malignancy. Mild, symmetric hypermetabolism in the palatine tonsils is nonspecific. Further evaluation may be performed with direct visualization.  FDG-avid bilateral level II, left level III, and left retropharyngeal lymph node metastases.  FDG-avid consolidation at left lung base is indeterminate. Differential diagnosis includes infectious, inflammatory, and neoplastic etiologies. Increased FDG uptake within the iliopsoas anterior to the right femoral head and adjacent to the gluteus medius insertion on the right greater trochanter, likely inflammatory. -Given significant bilateral mari metastases, initial plan was for 3 cycles of induction chemotherapy with gemcitabine and cisplatin followed by chemoRT with cisplatin. -unfortunately he had significant hematologic toxicity associated with cycle 1 with thrombocytopenia to 22 requiring PLT transfusion as well as neutropenia with a WBC of 0.8.   -Given the signficant hematologic toxicity, Dr. Fischer recommended that we transition to chemoRT as continued induction may hinder his definitive treatment w CRT.   -received neulasta x 1 and started CRT with weekly Cisplatin on 5/6/24 -today is C5D5 -discussed need to increase calories but overall patient tolerating treatment relatively well  -referred to Dr. Perez for what sounds like cervical radiculopathy but patient was a No show to the appointment -RTC Qday for RT, Qweek for Cisplatin and Q10-14 days for MD/PA follow up -RT scheduled to finish on 6/24, C7 of 7 Cisplatin scheduled for 6/17

## 2024-06-07 NOTE — HISTORY OF PRESENT ILLNESS
[de-identified] : Patient presents on C5D5 CRT with weekly Cisplatin for metastatic nasopharyngeal carcinoma.  He is s/p just one cycle of induction chemotherapy with gemcitabine and cisplatin which was discontinued for significant hematologic toxicity, thrombocytopenia to 22 requiring PLT transfusion as well as neutropenia with a WBC of 0.8. + Fatigue. + Decreased appetite and dysgeusia causing weight loss, ~15 lbs since the start of treatment. + RUQ pain, likely gas. + Thick oral/nasal secretions. + Xerostomia. + Mild odynophagia, no dysphagia. + Intermittent LUE pain/neuropathy, seems like cervical radiculopathy.  Tinnitus, R ear only. No N/V. No erythema around neck. No fevers. 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  He sees ENT, Dr. Ibanez for chronic nasopharyngitis. CT on 12/21/23 at  showed soft tissue thickening in nasopharynx and cervical lymph nodes. Pt had a left neck level II FNA on 1/5/24 which showed metastatic nasopharyngeal carcinoma.  MRI at  on 1/30/24 showed metastatic adenopathy involving jugular chains bilaterally with bulky L palatine tonsil.  He saw Dr. Avila Diaz on 2/12/24 and is scheduled to see Dr. Anisha roberts week.  He is scheduled for PET scan on 02/26/24.  He feels ok and is eating a normal diet currently.

## 2024-06-10 ENCOUNTER — APPOINTMENT (OUTPATIENT)
Age: 59
End: 2024-06-10

## 2024-06-10 ENCOUNTER — RESULT REVIEW (OUTPATIENT)
Age: 59
End: 2024-06-10

## 2024-06-10 LAB
BASOPHILS # BLD AUTO: 0 K/UL — SIGNIFICANT CHANGE UP (ref 0–0.2)
BASOPHILS NFR BLD AUTO: 0.3 % — SIGNIFICANT CHANGE UP (ref 0–2)
EOSINOPHIL # BLD AUTO: 0 K/UL — SIGNIFICANT CHANGE UP (ref 0–0.5)
EOSINOPHIL NFR BLD AUTO: 0.4 % — SIGNIFICANT CHANGE UP (ref 0–6)
HCT VFR BLD CALC: 26 % — LOW (ref 39–50)
HGB BLD-MCNC: 9 G/DL — LOW (ref 13–17)
LYMPHOCYTES # BLD AUTO: 0.1 K/UL — LOW (ref 1–3.3)
LYMPHOCYTES # BLD AUTO: 4 % — LOW (ref 13–44)
MCHC RBC-ENTMCNC: 31.9 PG — SIGNIFICANT CHANGE UP (ref 27–34)
MCHC RBC-ENTMCNC: 34.6 G/DL — SIGNIFICANT CHANGE UP (ref 32–36)
MCV RBC AUTO: 92.1 FL — SIGNIFICANT CHANGE UP (ref 80–100)
MONOCYTES # BLD AUTO: 0.3 K/UL — SIGNIFICANT CHANGE UP (ref 0–0.9)
MONOCYTES NFR BLD AUTO: 8.7 % — SIGNIFICANT CHANGE UP (ref 2–14)
NEUTROPHILS # BLD AUTO: 2.7 K/UL — SIGNIFICANT CHANGE UP (ref 1.8–7.4)
NEUTROPHILS NFR BLD AUTO: 86.6 % — HIGH (ref 43–77)
PLATELET # BLD AUTO: 63 K/UL — LOW (ref 150–400)
RBC # BLD: 2.83 M/UL — LOW (ref 4.2–5.8)
RBC # FLD: 14.7 % — HIGH (ref 10.3–14.5)
WBC # BLD: 3.1 K/UL — LOW (ref 3.8–10.5)
WBC # FLD AUTO: 3.1 K/UL — LOW (ref 3.8–10.5)

## 2024-06-10 PROCEDURE — 77014: CPT | Mod: 26

## 2024-06-11 ENCOUNTER — OUTPATIENT (OUTPATIENT)
Dept: OUTPATIENT SERVICES | Facility: HOSPITAL | Age: 59
LOS: 1 days | Discharge: HOME | End: 2024-06-11

## 2024-06-11 DIAGNOSIS — C11.9 MALIGNANT NEOPLASM OF NASOPHARYNX, UNSPECIFIED: ICD-10-CM

## 2024-06-11 PROCEDURE — 77427 RADIATION TX MANAGEMENT X5: CPT

## 2024-06-11 PROCEDURE — 77387B: CUSTOM | Mod: 26

## 2024-06-12 ENCOUNTER — NON-APPOINTMENT (OUTPATIENT)
Age: 59
End: 2024-06-12

## 2024-06-12 LAB
ALBUMIN SERPL ELPH-MCNC: 3.9 G/DL
ALP BLD-CCNC: 76 U/L
ALT SERPL-CCNC: 8 U/L
ANION GAP SERPL CALC-SCNC: 11 MMOL/L
AST SERPL-CCNC: 10 U/L
BILIRUB SERPL-MCNC: 0.4 MG/DL
BUN SERPL-MCNC: 24 MG/DL
CALCIUM SERPL-MCNC: 8.5 MG/DL
CHLORIDE SERPL-SCNC: 95 MMOL/L
CO2 SERPL-SCNC: 28 MMOL/L
CREAT SERPL-MCNC: 0.72 MG/DL
EGFR: 107 ML/MIN/1.73M2
GLUCOSE SERPL-MCNC: 190 MG/DL
MAGNESIUM SERPL-MCNC: 0.9 MG/DL
POTASSIUM SERPL-SCNC: 4.4 MMOL/L
PROT SERPL-MCNC: 7.3 G/DL
SODIUM SERPL-SCNC: 134 MMOL/L

## 2024-06-12 PROCEDURE — 77387B: CUSTOM | Mod: 26

## 2024-06-13 PROCEDURE — 77387B: CUSTOM | Mod: 26

## 2024-06-14 ENCOUNTER — NON-APPOINTMENT (OUTPATIENT)
Age: 59
End: 2024-06-14

## 2024-06-14 ENCOUNTER — APPOINTMENT (OUTPATIENT)
Dept: HEMATOLOGY ONCOLOGY | Facility: CLINIC | Age: 59
End: 2024-06-14

## 2024-06-14 ENCOUNTER — RESULT REVIEW (OUTPATIENT)
Age: 59
End: 2024-06-14

## 2024-06-14 VITALS
HEART RATE: 100 BPM | WEIGHT: 198 LBS | BODY MASS INDEX: 29.24 KG/M2 | OXYGEN SATURATION: 97 % | SYSTOLIC BLOOD PRESSURE: 123 MMHG | RESPIRATION RATE: 18 BRPM | DIASTOLIC BLOOD PRESSURE: 77 MMHG

## 2024-06-14 LAB
BASOPHILS # BLD AUTO: 0 K/UL — SIGNIFICANT CHANGE UP (ref 0–0.2)
BASOPHILS NFR BLD AUTO: 0.2 % — SIGNIFICANT CHANGE UP (ref 0–2)
EOSINOPHIL # BLD AUTO: 0 K/UL — SIGNIFICANT CHANGE UP (ref 0–0.5)
EOSINOPHIL NFR BLD AUTO: 0.6 % — SIGNIFICANT CHANGE UP (ref 0–6)
HCT VFR BLD CALC: 27 % — LOW (ref 39–50)
HGB BLD-MCNC: 9.1 G/DL — LOW (ref 13–17)
LYMPHOCYTES # BLD AUTO: 0.2 K/UL — LOW (ref 1–3.3)
LYMPHOCYTES # BLD AUTO: 6.8 % — LOW (ref 13–44)
MCHC RBC-ENTMCNC: 31.5 PG — SIGNIFICANT CHANGE UP (ref 27–34)
MCHC RBC-ENTMCNC: 33.8 G/DL — SIGNIFICANT CHANGE UP (ref 32–36)
MCV RBC AUTO: 93.4 FL — SIGNIFICANT CHANGE UP (ref 80–100)
MONOCYTES # BLD AUTO: 0.2 K/UL — SIGNIFICANT CHANGE UP (ref 0–0.9)
MONOCYTES NFR BLD AUTO: 8.2 % — SIGNIFICANT CHANGE UP (ref 2–14)
NEUTROPHILS # BLD AUTO: 2.3 K/UL — SIGNIFICANT CHANGE UP (ref 1.8–7.4)
NEUTROPHILS NFR BLD AUTO: 84.2 % — HIGH (ref 43–77)
PLATELET # BLD AUTO: 64 K/UL — LOW (ref 150–400)
RBC # BLD: 2.89 M/UL — LOW (ref 4.2–5.8)
RBC # FLD: 15 % — HIGH (ref 10.3–14.5)
WBC # BLD: 2.7 K/UL — LOW (ref 3.8–10.5)
WBC # FLD AUTO: 2.7 K/UL — LOW (ref 3.8–10.5)

## 2024-06-14 PROCEDURE — 77387B: CUSTOM | Mod: 26

## 2024-06-14 NOTE — REVIEW OF SYSTEMS
[Dysgeusia: Grade 2 - Altered taste with change in diet (e.g., oral supplements); noxious or unpleasant taste; loss of taste] : Dysgeusia: Grade 2 - Altered taste with change in diet (e.g., oral supplements); noxious or unpleasant taste; loss of taste

## 2024-06-14 NOTE — PHYSICAL EXAM
[de-identified] : moderate erythema of the posterior opx; no thrush. [de-identified] : tachycardic  [de-identified] : grade 1-2 dermatitis

## 2024-06-14 NOTE — DISEASE MANAGEMENT
[FreeTextEntry4] : NPC [TTNM] : 1 [NTNM] : 2 [MTNM] : 0 [de-identified] : 8322 [de-identified] : 6402 [de-identified] : nasopharynx

## 2024-06-17 ENCOUNTER — RESULT REVIEW (OUTPATIENT)
Age: 59
End: 2024-06-17

## 2024-06-17 ENCOUNTER — APPOINTMENT (OUTPATIENT)
Age: 59
End: 2024-06-17

## 2024-06-17 LAB
BASOPHILS # BLD AUTO: 0 K/UL — SIGNIFICANT CHANGE UP (ref 0–0.2)
BASOPHILS NFR BLD AUTO: 0.9 % — SIGNIFICANT CHANGE UP (ref 0–2)
EOSINOPHIL # BLD AUTO: 0 K/UL — SIGNIFICANT CHANGE UP (ref 0–0.5)
EOSINOPHIL NFR BLD AUTO: 0 % — SIGNIFICANT CHANGE UP (ref 0–6)
HCT VFR BLD CALC: 26.7 % — LOW (ref 39–50)
HGB BLD-MCNC: 9.1 G/DL — LOW (ref 13–17)
LYMPHOCYTES # BLD AUTO: 0.2 K/UL — LOW (ref 1–3.3)
LYMPHOCYTES # BLD AUTO: 6.8 % — LOW (ref 13–44)
MCHC RBC-ENTMCNC: 31.8 PG — SIGNIFICANT CHANGE UP (ref 27–34)
MCHC RBC-ENTMCNC: 34 G/DL — SIGNIFICANT CHANGE UP (ref 32–36)
MCV RBC AUTO: 93.6 FL — SIGNIFICANT CHANGE UP (ref 80–100)
MONOCYTES # BLD AUTO: 0.3 K/UL — SIGNIFICANT CHANGE UP (ref 0–0.9)
MONOCYTES NFR BLD AUTO: 10.4 % — SIGNIFICANT CHANGE UP (ref 2–14)
NEUTROPHILS # BLD AUTO: 2.1 K/UL — SIGNIFICANT CHANGE UP (ref 1.8–7.4)
NEUTROPHILS NFR BLD AUTO: 81.9 % — HIGH (ref 43–77)
PLATELET # BLD AUTO: 53 K/UL — LOW (ref 150–400)
RBC # BLD: 2.85 M/UL — LOW (ref 4.2–5.8)
RBC # FLD: 14.9 % — HIGH (ref 10.3–14.5)
WBC # BLD: 2.6 K/UL — LOW (ref 3.8–10.5)
WBC # FLD AUTO: 2.6 K/UL — LOW (ref 3.8–10.5)

## 2024-06-17 PROCEDURE — 77387B: CUSTOM | Mod: 26

## 2024-06-17 RX ORDER — OXYCODONE 5 MG/1
5 TABLET ORAL EVERY 6 HOURS
Qty: 20 | Refills: 0 | Status: ACTIVE | COMMUNITY
Start: 2024-06-17 | End: 1900-01-01

## 2024-06-17 NOTE — REASON FOR VISIT
Routine On-Treatment] : a routine on-treatment visit for [Head and Neck Cancer] : head and neck cancer

## 2024-06-18 ENCOUNTER — APPOINTMENT (OUTPATIENT)
Dept: HEMATOLOGY ONCOLOGY | Facility: CLINIC | Age: 59
End: 2024-06-18
Payer: MEDICAID

## 2024-06-18 ENCOUNTER — APPOINTMENT (OUTPATIENT)
Age: 59
End: 2024-06-18

## 2024-06-18 VITALS
DIASTOLIC BLOOD PRESSURE: 81 MMHG | HEIGHT: 69 IN | HEART RATE: 100 BPM | WEIGHT: 200.95 LBS | TEMPERATURE: 97.4 F | SYSTOLIC BLOOD PRESSURE: 137 MMHG | OXYGEN SATURATION: 98 % | BODY MASS INDEX: 29.76 KG/M2 | RESPIRATION RATE: 18 BRPM

## 2024-06-18 DIAGNOSIS — Z51.11 ENCOUNTER FOR ANTINEOPLASTIC CHEMOTHERAPY: ICD-10-CM

## 2024-06-18 DIAGNOSIS — E86.0 DEHYDRATION: ICD-10-CM

## 2024-06-18 DIAGNOSIS — R11.2 NAUSEA WITH VOMITING, UNSPECIFIED: ICD-10-CM

## 2024-06-18 DIAGNOSIS — R52 PAIN, UNSPECIFIED: ICD-10-CM

## 2024-06-18 PROCEDURE — 86850 RBC ANTIBODY SCREEN: CPT

## 2024-06-18 PROCEDURE — 86901 BLOOD TYPING SEROLOGIC RH(D): CPT

## 2024-06-18 PROCEDURE — 36415 COLL VENOUS BLD VENIPUNCTURE: CPT

## 2024-06-18 PROCEDURE — 86900 BLOOD TYPING SEROLOGIC ABO: CPT

## 2024-06-18 PROCEDURE — 77427 RADIATION TX MANAGEMENT X5: CPT

## 2024-06-18 PROCEDURE — P9100: CPT

## 2024-06-18 PROCEDURE — P9037: CPT

## 2024-06-18 PROCEDURE — 77387B: CUSTOM | Mod: 26

## 2024-06-18 PROCEDURE — 99214 OFFICE O/P EST MOD 30 MIN: CPT

## 2024-06-18 NOTE — ASSESSMENT
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  It is EBV positive on path report.  -Dr Fischer discussed treatment of localized nasopharyngeal carcinoma with induction chemo therapy followed by chemoRT.  -PET from 02/26/24 shows FDG-avid soft tissue thickening in nasopharynx corresponds to known malignancy. Mild, symmetric hypermetabolism in the palatine tonsils is nonspecific. Further evaluation may be performed with direct visualization.  FDG-avid bilateral level II, left level III, and left retropharyngeal lymph node metastases.  FDG-avid consolidation at left lung base is indeterminate. Differential diagnosis includes infectious, inflammatory, and neoplastic etiologies. Increased FDG uptake within the iliopsoas anterior to the right femoral head and adjacent to the gluteus medius insertion on the right greater trochanter, likely inflammatory. -Given significant bilateral mari metastases, initial plan was for 3 cycles of induction chemotherapy with gemcitabine and cisplatin followed by chemoRT with cisplatin. -unfortunately he had significant hematologic toxicity associated with cycle 1 with thrombocytopenia to 22 requiring PLT transfusion as well as neutropenia with a WBC of 0.8.   -Given the significant hematologic toxicity, Dr. Fischer recommended that we transition to chemoRT as continued induction may hinder his definitive treatment w CRT.   -received neulasta x 1 and started CRT with weekly Cisplatin on 5/6/24 -today is C7D1, 7 of 7 -scheduled to complete RT on 6/21 -again discussed need to increase calories but overall patient has not lost too much weight -referred to Dr. Perez for what sounds like cervical radiculopathy but patient was a No show to the appointment and symptoms are currently resolved  -RTC in 2 weeks to assess post treatment sequela

## 2024-06-18 NOTE — HISTORY OF PRESENT ILLNESS
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  He sees ENT, Dr. Ibanez for chronic nasopharyngitis. CT on 12/21/23 at  showed soft tissue thickening in nasopharynx and cervical lymph nodes. Pt had a left neck level II FNA on 1/5/24 which showed metastatic nasopharyngeal carcinoma.  MRI at  on 1/30/24 showed metastatic adenopathy involving jugular chains bilaterally with bulky L palatine tonsil.  He saw Dr. Avila Diaz on 2/12/24 and is scheduled to see Dr. Anisha roberts week.  He is scheduled for PET scan on 02/26/24.  He feels ok and is eating a normal diet currently.   [de-identified] : Patient presents on C7D1 CRT with weekly Cisplatin for metastatic nasopharyngeal carcinoma.  He is s/p just one cycle of induction chemotherapy with gemcitabine and cisplatin which was discontinued for significant hematologic toxicity, thrombocytopenia to 22 requiring PLT transfusion as well as neutropenia with a WBC of 0.8. + Increased odynophagia, now severe. + Erythema right neck. + Decreased appetite and dysgeusia.  + Weight loss, ~15-20 lbs since the start of treatment. + Fatigue, worse. + Xerostomia, worse.  Intermittent LUE pain/neuropathy, seems like cervical radiculopathy, currently resolved. Thick oral/nasal secretions resolved. Tinnitus, R ear only, resolved. No N/V. No fevers.

## 2024-06-18 NOTE — PHYSICAL EXAM
[Fully active, able to carry on all pre-disease performance without restriction] : Status 0 - Fully active, able to carry on all pre-disease performance without restriction [Normal] : affect appropriate [de-identified] : cervical lymphadenopathy

## 2024-06-19 ENCOUNTER — NON-APPOINTMENT (OUTPATIENT)
Age: 59
End: 2024-06-19

## 2024-06-19 DIAGNOSIS — Z51.89 ENCOUNTER FOR OTHER SPECIFIED AFTERCARE: ICD-10-CM

## 2024-06-19 LAB
ALBUMIN SERPL ELPH-MCNC: 3.9 G/DL
ALP BLD-CCNC: 81 U/L
ALT SERPL-CCNC: 9 U/L
ANION GAP SERPL CALC-SCNC: 11 MMOL/L
AST SERPL-CCNC: 12 U/L
BILIRUB SERPL-MCNC: 0.2 MG/DL
BUN SERPL-MCNC: 37 MG/DL
CALCIUM SERPL-MCNC: 9.3 MG/DL
CHLORIDE SERPL-SCNC: 98 MMOL/L
CO2 SERPL-SCNC: 29 MMOL/L
CREAT SERPL-MCNC: 0.75 MG/DL
EGFR: 105 ML/MIN/1.73M2
GLUCOSE SERPL-MCNC: 186 MG/DL
MAGNESIUM SERPL-MCNC: 1 MG/DL
POTASSIUM SERPL-SCNC: 4.5 MMOL/L
PROT SERPL-MCNC: 7.7 G/DL
SODIUM SERPL-SCNC: 138 MMOL/L

## 2024-06-19 PROCEDURE — 77387B: CUSTOM | Mod: 26

## 2024-06-20 VITALS
HEART RATE: 94 BPM | HEIGHT: 69 IN | RESPIRATION RATE: 16 BRPM | WEIGHT: 196 LBS | DIASTOLIC BLOOD PRESSURE: 83 MMHG | SYSTOLIC BLOOD PRESSURE: 143 MMHG | OXYGEN SATURATION: 96 % | TEMPERATURE: 97.5 F | BODY MASS INDEX: 29.03 KG/M2

## 2024-06-20 PROCEDURE — 77387B: CUSTOM | Mod: 26

## 2024-06-20 RX ORDER — LIDOCAINE HYDROCHLORIDE 20 MG/ML
2 SOLUTION ORAL; TOPICAL
Qty: 1 | Refills: 4 | Status: ACTIVE | COMMUNITY
Start: 2024-05-20 | End: 1900-01-01

## 2024-06-20 NOTE — REVIEW OF SYSTEMS
[Dysphagia: Grade 0] : Dysphagia: Grade 0 [Edema Limbs: Grade 0] : Edema Limbs: Grade 0  [Fatigue: Grade 0] : Fatigue: Grade 0 [Localized Edema: Grade 0] : Localized Edema: Grade 0  [Neck Edema: Grade 0] : Neck Edema: Grade 0 [Tinnitus - Grade 0] : Tinnitus - Grade 0 [Mucositis Oral: Grade 1 - Asymptomatic or mild symptoms; intervention not indicated] : Mucositis Oral: Grade 1 - Asymptomatic or mild symptoms; intervention not indicated [Xerostomia: Grade 1 - Symptomatic (e.g., dry or thick saliva) without significant dietary alteration; unstimulated saliva flow >0.2 ml/min] : Xerostomia: Grade 1 - Symptomatic (e.g., dry or thick saliva) without significant dietary alteration; unstimulated saliva flow >0.2 ml/min [Oral Pain: Grade 2 - Moderate pain; limiting instrumental ADL] : Oral Pain: Grade 2 - Moderate pain; limiting instrumental ADL [Dysgeusia: Grade 2 - Altered taste with change in diet (e.g., oral supplements); noxious or unpleasant taste; loss of taste] : Dysgeusia: Grade 2 - Altered taste with change in diet (e.g., oral supplements); noxious or unpleasant taste; loss of taste [Dyspnea: Grade 0] : Dyspnea: Grade 0 [Skin Hyperpigmentation: Grade 1 - Hyperpigmentation covering <10% BSA; no psychosocial impact] : Skin Hyperpigmentation: Grade 1 - Hyperpigmentation covering <10% BSA; no psychosocial impact [Dermatitis Radiation: Grade 1 - Faint erythema or dry desquamation] : Dermatitis Radiation: Grade 1 - Faint erythema or dry desquamation [Dysphagia: Grade 1 - Symptomatic, able to eat regular diet] : Dysphagia: Grade 1 - Symptomatic, able to eat regular diet [Fatigue: Grade 1 - Fatigue relieved by rest] : Fatigue: Grade 1 - Fatigue relieved by rest [Skin Hyperpigmentation: Grade 2 - Hyperpigmentation covering >10% BSA; associated psychosocial impact] : Skin Hyperpigmentation: Grade 2 - Hyperpigmentation covering >10% BSA; associated psychosocial impact [Dermatitis Radiation: Grade 2 - Moderate to brisk erythema; patchy moist desquamation, mostly confined to skin folds and creases; moderate edema] : Dermatitis Radiation: Grade 2 - Moderate to brisk erythema; patchy moist desquamation, mostly confined to skin folds and creases; moderate edema

## 2024-06-20 NOTE — PHYSICAL EXAM
[Sclera] : the sclera and conjunctiva were normal [Extraocular Movements] : extraocular movements were intact [Heart Sounds] : normal S1 and S2 [Normal] : oriented to person, place and time, the affect was normal, the mood was normal and not anxious [de-identified] : moderate erythema of the posterior opx; no thrush. [de-identified] : tachycardic  [de-identified] : moderate hyperpigmentation, dry/moist desquamation R>L neck

## 2024-06-20 NOTE — DISEASE MANAGEMENT
[Clinical] : TNM Stage: c [III] : III [FreeTextEntry4] : NPC [TTNM] : 1 [NTNM] : 2 [MTNM] : 0 [de-identified] : 7066 [de-identified] : 4903 [de-identified] : nasopharynx

## 2024-06-20 NOTE — VITALS
[Maximal Pain Intensity: 2/10] : 2/10 [Least Pain Intensity: 1/10] : 1/10 [Pain Description/Quality: ___] : Pain description/quality: [unfilled] [Pain Duration: ___] : Pain duration: [unfilled] [Pain Location: ___] : Pain Location: [unfilled] [Pain Interferes with ADLs] : Pain interferes with activities of daily living. [NoTreatment Scheduled] : no treatment scheduled [Adjuvant (neuroleptics)] : adjuvant (neuroleptics) [80: Normal activity with effort; some signs or symptoms of disease.] : 80: Normal activity with effort; some signs or symptoms of disease.  [Maximal Pain Intensity: 6/10] : 6/10 [Least Pain Intensity: 6/10] : 6/10

## 2024-06-21 ENCOUNTER — APPOINTMENT (OUTPATIENT)
Dept: HEMATOLOGY ONCOLOGY | Facility: CLINIC | Age: 59
End: 2024-06-21

## 2024-06-21 PROCEDURE — 77387B: CUSTOM | Mod: 26

## 2024-06-24 ENCOUNTER — RESULT REVIEW (OUTPATIENT)
Age: 59
End: 2024-06-24

## 2024-06-24 ENCOUNTER — APPOINTMENT (OUTPATIENT)
Dept: HEMATOLOGY ONCOLOGY | Facility: CLINIC | Age: 59
End: 2024-06-24

## 2024-06-24 ENCOUNTER — APPOINTMENT (OUTPATIENT)
Age: 59
End: 2024-06-24

## 2024-06-24 LAB
BASOPHILS # BLD AUTO: 0 K/UL — SIGNIFICANT CHANGE UP (ref 0–0.2)
BASOPHILS NFR BLD AUTO: 0.9 % — SIGNIFICANT CHANGE UP (ref 0–2)
EOSINOPHIL # BLD AUTO: 0 K/UL — SIGNIFICANT CHANGE UP (ref 0–0.5)
EOSINOPHIL NFR BLD AUTO: 0.7 % — SIGNIFICANT CHANGE UP (ref 0–6)
HCT VFR BLD CALC: 25.5 % — LOW (ref 39–50)
HGB BLD-MCNC: 8.7 G/DL — LOW (ref 13–17)
LYMPHOCYTES # BLD AUTO: 0.2 K/UL — LOW (ref 1–3.3)
LYMPHOCYTES # BLD AUTO: 10.4 % — LOW (ref 13–44)
MCHC RBC-ENTMCNC: 32.2 PG — SIGNIFICANT CHANGE UP (ref 27–34)
MCHC RBC-ENTMCNC: 34.2 G/DL — SIGNIFICANT CHANGE UP (ref 32–36)
MCV RBC AUTO: 94.1 FL — SIGNIFICANT CHANGE UP (ref 80–100)
MONOCYTES # BLD AUTO: 0.3 K/UL — SIGNIFICANT CHANGE UP (ref 0–0.9)
MONOCYTES NFR BLD AUTO: 11.5 % — SIGNIFICANT CHANGE UP (ref 2–14)
NEUTROPHILS # BLD AUTO: 1.8 K/UL — SIGNIFICANT CHANGE UP (ref 1.8–7.4)
NEUTROPHILS NFR BLD AUTO: 76.5 % — SIGNIFICANT CHANGE UP (ref 43–77)
PLATELET # BLD AUTO: 28 K/UL — LOW (ref 150–400)
RBC # BLD: 2.71 M/UL — LOW (ref 4.2–5.8)
RBC # FLD: 14.7 % — HIGH (ref 10.3–14.5)
WBC # BLD: 2.3 K/UL — LOW (ref 3.8–10.5)
WBC # FLD AUTO: 2.3 K/UL — LOW (ref 3.8–10.5)

## 2024-06-24 PROCEDURE — 77387B: CUSTOM | Mod: 26

## 2024-06-24 RX ORDER — OXYCODONE 10 MG/1
10 TABLET ORAL
Qty: 40 | Refills: 0 | Status: ACTIVE | COMMUNITY
Start: 2024-06-24 | End: 1900-01-01

## 2024-06-25 LAB
ALBUMIN SERPL ELPH-MCNC: 3.9 G/DL
ALP BLD-CCNC: 97 U/L
ALT SERPL-CCNC: 11 U/L
ANION GAP SERPL CALC-SCNC: 10 MMOL/L
AST SERPL-CCNC: 14 U/L
BILIRUB SERPL-MCNC: 0.3 MG/DL
BUN SERPL-MCNC: 23 MG/DL
CALCIUM SERPL-MCNC: 9.2 MG/DL
CHLORIDE SERPL-SCNC: 98 MMOL/L
CO2 SERPL-SCNC: 29 MMOL/L
CREAT SERPL-MCNC: 0.85 MG/DL
EGFR: 101 ML/MIN/1.73M2
GLUCOSE SERPL-MCNC: 148 MG/DL
MAGNESIUM SERPL-MCNC: 1 MG/DL
POTASSIUM SERPL-SCNC: 4.4 MMOL/L
PROT SERPL-MCNC: 8 G/DL
SODIUM SERPL-SCNC: 138 MMOL/L

## 2024-06-26 ENCOUNTER — APPOINTMENT (OUTPATIENT)
Age: 59
End: 2024-06-26

## 2024-07-01 ENCOUNTER — APPOINTMENT (OUTPATIENT)
Dept: OTOLARYNGOLOGY | Facility: CLINIC | Age: 59
End: 2024-07-01
Payer: MEDICAID

## 2024-07-01 ENCOUNTER — INPATIENT (INPATIENT)
Facility: HOSPITAL | Age: 59
LOS: 8 days | Discharge: HOME | End: 2024-07-10
Attending: OTOLARYNGOLOGY | Admitting: OTOLARYNGOLOGY
Payer: MEDICAID

## 2024-07-01 VITALS
OXYGEN SATURATION: 96 % | WEIGHT: 190.04 LBS | TEMPERATURE: 98 F | DIASTOLIC BLOOD PRESSURE: 69 MMHG | HEIGHT: 69 IN | SYSTOLIC BLOOD PRESSURE: 130 MMHG | RESPIRATION RATE: 18 BRPM | HEART RATE: 100 BPM

## 2024-07-01 VITALS — DIASTOLIC BLOOD PRESSURE: 71 MMHG | SYSTOLIC BLOOD PRESSURE: 110 MMHG | HEART RATE: 101 BPM

## 2024-07-01 DIAGNOSIS — E78.5 HYPERLIPIDEMIA, UNSPECIFIED: ICD-10-CM

## 2024-07-01 DIAGNOSIS — R53.1 WEAKNESS: ICD-10-CM

## 2024-07-01 DIAGNOSIS — I10 ESSENTIAL (PRIMARY) HYPERTENSION: ICD-10-CM

## 2024-07-01 DIAGNOSIS — R55 SYNCOPE AND COLLAPSE: ICD-10-CM

## 2024-07-01 DIAGNOSIS — E11.9 TYPE 2 DIABETES MELLITUS WITHOUT COMPLICATIONS: ICD-10-CM

## 2024-07-01 DIAGNOSIS — Z86.718 PERSONAL HISTORY OF OTHER VENOUS THROMBOSIS AND EMBOLISM: ICD-10-CM

## 2024-07-01 DIAGNOSIS — C11.9 MALIGNANT NEOPLASM OF NASOPHARYNX, UNSPECIFIED: ICD-10-CM

## 2024-07-01 PROBLEM — E46 MALNUTRITION, CALORIE: Status: ACTIVE | Noted: 2024-07-01

## 2024-07-01 PROBLEM — E86.0 DEHYDRATION: Status: ACTIVE | Noted: 2024-07-01

## 2024-07-01 LAB
ADD ON TEST-SPECIMEN IN LAB: SIGNIFICANT CHANGE UP
ALBUMIN SERPL ELPH-MCNC: 3.6 G/DL — SIGNIFICANT CHANGE UP (ref 3.3–5)
ALP SERPL-CCNC: 88 U/L — SIGNIFICANT CHANGE UP (ref 40–120)
ALT FLD-CCNC: 10 U/L — SIGNIFICANT CHANGE UP (ref 4–41)
ANION GAP SERPL CALC-SCNC: 9 MMOL/L — SIGNIFICANT CHANGE UP (ref 7–14)
ANISOCYTOSIS BLD QL: SLIGHT — SIGNIFICANT CHANGE UP
AST SERPL-CCNC: 10 U/L — SIGNIFICANT CHANGE UP (ref 4–40)
BASOPHILS # BLD AUTO: 0 K/UL — SIGNIFICANT CHANGE UP (ref 0–0.2)
BASOPHILS NFR BLD AUTO: 0 % — SIGNIFICANT CHANGE UP (ref 0–2)
BILIRUB SERPL-MCNC: 0.3 MG/DL — SIGNIFICANT CHANGE UP (ref 0.2–1.2)
BUN SERPL-MCNC: 24 MG/DL — HIGH (ref 7–23)
CALCIUM SERPL-MCNC: 9.2 MG/DL — SIGNIFICANT CHANGE UP (ref 8.4–10.5)
CHLORIDE SERPL-SCNC: 97 MMOL/L — LOW (ref 98–107)
CO2 SERPL-SCNC: 30 MMOL/L — SIGNIFICANT CHANGE UP (ref 22–31)
CREAT SERPL-MCNC: 0.82 MG/DL — SIGNIFICANT CHANGE UP (ref 0.5–1.3)
EGFR: 102 ML/MIN/1.73M2 — SIGNIFICANT CHANGE UP
EOSINOPHIL # BLD AUTO: 0 K/UL — SIGNIFICANT CHANGE UP (ref 0–0.5)
EOSINOPHIL NFR BLD AUTO: 0 % — SIGNIFICANT CHANGE UP (ref 0–6)
GLUCOSE BLDC GLUCOMTR-MCNC: 108 MG/DL — HIGH (ref 70–99)
GLUCOSE SERPL-MCNC: 175 MG/DL — HIGH (ref 70–99)
HCT VFR BLD CALC: 23.2 % — LOW (ref 39–50)
HGB BLD-MCNC: 8 G/DL — LOW (ref 13–17)
HYPOCHROMIA BLD QL: SLIGHT — SIGNIFICANT CHANGE UP
IANC: 1.58 K/UL — LOW (ref 1.8–7.4)
LYMPHOCYTES # BLD AUTO: 0.07 K/UL — LOW (ref 1–3.3)
LYMPHOCYTES # BLD AUTO: 3.5 % — LOW (ref 13–44)
MACROCYTES BLD QL: SLIGHT — SIGNIFICANT CHANGE UP
MAGNESIUM SERPL-MCNC: 1.1 MG/DL — LOW (ref 1.6–2.6)
MCHC RBC-ENTMCNC: 32.5 PG — SIGNIFICANT CHANGE UP (ref 27–34)
MCHC RBC-ENTMCNC: 34.5 GM/DL — SIGNIFICANT CHANGE UP (ref 32–36)
MCV RBC AUTO: 94.3 FL — SIGNIFICANT CHANGE UP (ref 80–100)
MONOCYTES # BLD AUTO: 0.24 K/UL — SIGNIFICANT CHANGE UP (ref 0–0.9)
MONOCYTES NFR BLD AUTO: 12.3 % — SIGNIFICANT CHANGE UP (ref 2–14)
NEUTROPHILS # BLD AUTO: 1.67 K/UL — LOW (ref 1.8–7.4)
NEUTROPHILS NFR BLD AUTO: 84.2 % — HIGH (ref 43–77)
OVALOCYTES BLD QL SMEAR: SLIGHT — SIGNIFICANT CHANGE UP
PHOSPHATE SERPL-MCNC: 2.7 MG/DL — SIGNIFICANT CHANGE UP (ref 2.5–4.5)
PLAT MORPH BLD: NORMAL — SIGNIFICANT CHANGE UP
PLATELET # BLD AUTO: 21 K/UL — LOW (ref 150–400)
PLATELET COUNT - ESTIMATE: ABNORMAL
POIKILOCYTOSIS BLD QL AUTO: SLIGHT — SIGNIFICANT CHANGE UP
POLYCHROMASIA BLD QL SMEAR: SLIGHT — SIGNIFICANT CHANGE UP
POTASSIUM SERPL-MCNC: 4.8 MMOL/L — SIGNIFICANT CHANGE UP (ref 3.5–5.3)
POTASSIUM SERPL-SCNC: 4.8 MMOL/L — SIGNIFICANT CHANGE UP (ref 3.5–5.3)
PROT SERPL-MCNC: 8.4 G/DL — HIGH (ref 6–8.3)
RBC # BLD: 2.46 M/UL — LOW (ref 4.2–5.8)
RBC # FLD: 15.2 % — HIGH (ref 10.3–14.5)
RBC BLD AUTO: ABNORMAL
SODIUM SERPL-SCNC: 136 MMOL/L — SIGNIFICANT CHANGE UP (ref 135–145)
TROPONIN T, HIGH SENSITIVITY RESULT: 20 NG/L — SIGNIFICANT CHANGE UP
TROPONIN T, HIGH SENSITIVITY RESULT: 20 NG/L — SIGNIFICANT CHANGE UP
WBC # BLD: 1.98 K/UL — LOW (ref 3.8–10.5)
WBC # FLD AUTO: 1.98 K/UL — LOW (ref 3.8–10.5)

## 2024-07-01 PROCEDURE — 71046 X-RAY EXAM CHEST 2 VIEWS: CPT | Mod: 26

## 2024-07-01 PROCEDURE — 99214 OFFICE O/P EST MOD 30 MIN: CPT | Mod: 25

## 2024-07-01 PROCEDURE — 71045 X-RAY EXAM CHEST 1 VIEW: CPT | Mod: 26,59

## 2024-07-01 PROCEDURE — 31231 NASAL ENDOSCOPY DX: CPT

## 2024-07-01 PROCEDURE — 99285 EMERGENCY DEPT VISIT HI MDM: CPT

## 2024-07-01 PROCEDURE — 99223 1ST HOSP IP/OBS HIGH 75: CPT

## 2024-07-01 RX ORDER — APIXABAN 5 MG/1
2.5 TABLET, FILM COATED ORAL EVERY 12 HOURS
Refills: 0 | Status: DISCONTINUED | OUTPATIENT
Start: 2024-07-01 | End: 2024-07-02

## 2024-07-01 RX ORDER — GLUCAGON HYDROCHLORIDE 1 MG/ML
1 INJECTION, POWDER, FOR SOLUTION INTRAMUSCULAR; INTRAVENOUS; SUBCUTANEOUS ONCE
Refills: 0 | Status: DISCONTINUED | OUTPATIENT
Start: 2024-07-01 | End: 2024-07-10

## 2024-07-01 RX ORDER — DEXTROSE MONOHYDRATE 100 MG/ML
125 INJECTION, SOLUTION INTRAVENOUS ONCE
Refills: 0 | Status: DISCONTINUED | OUTPATIENT
Start: 2024-07-01 | End: 2024-07-10

## 2024-07-01 RX ORDER — NALOXONE HYDROCHLORIDE 1 MG/ML
0.4 INJECTION PARENTERAL ONCE
Refills: 0 | Status: DISCONTINUED | OUTPATIENT
Start: 2024-07-01 | End: 2024-07-10

## 2024-07-01 RX ORDER — ATORVASTATIN CALCIUM 20 MG/1
40 TABLET, FILM COATED ORAL AT BEDTIME
Refills: 0 | Status: DISCONTINUED | OUTPATIENT
Start: 2024-07-01 | End: 2024-07-10

## 2024-07-01 RX ORDER — OXYCODONE HYDROCHLORIDE 100 MG/5ML
10 SOLUTION ORAL EVERY 4 HOURS
Refills: 0 | Status: DISCONTINUED | OUTPATIENT
Start: 2024-07-01 | End: 2024-07-08

## 2024-07-01 RX ORDER — OXYCODONE HYDROCHLORIDE 100 MG/5ML
7.5 SOLUTION ORAL EVERY 4 HOURS
Refills: 0 | Status: DISCONTINUED | OUTPATIENT
Start: 2024-07-01 | End: 2024-07-08

## 2024-07-01 RX ORDER — ONDANSETRON HYDROCHLORIDE 2 MG/ML
8 INJECTION INTRAMUSCULAR; INTRAVENOUS EVERY 8 HOURS
Refills: 0 | Status: DISCONTINUED | OUTPATIENT
Start: 2024-07-01 | End: 2024-07-10

## 2024-07-01 RX ORDER — ACETAMINOPHEN 325 MG
650 TABLET ORAL EVERY 6 HOURS
Refills: 0 | Status: DISCONTINUED | OUTPATIENT
Start: 2024-07-01 | End: 2024-07-05

## 2024-07-01 RX ORDER — MAGNESIUM SULFATE 100 %
2 POWDER (GRAM) MISCELLANEOUS ONCE
Refills: 0 | Status: COMPLETED | OUTPATIENT
Start: 2024-07-01 | End: 2024-07-01

## 2024-07-01 RX ORDER — LOSARTAN POTASSIUM 100 MG/1
25 TABLET, FILM COATED ORAL DAILY
Refills: 0 | Status: DISCONTINUED | OUTPATIENT
Start: 2024-07-01 | End: 2024-07-10

## 2024-07-01 RX ORDER — SENNOSIDES 8.6 MG
2 TABLET ORAL AT BEDTIME
Refills: 0 | Status: DISCONTINUED | OUTPATIENT
Start: 2024-07-01 | End: 2024-07-10

## 2024-07-01 RX ORDER — INSULIN LISPRO 100 [IU]/ML
INJECTION, SOLUTION SUBCUTANEOUS
Refills: 0 | Status: DISCONTINUED | OUTPATIENT
Start: 2024-07-01 | End: 2024-07-10

## 2024-07-01 RX ORDER — DEXTROSE MONOHYDRATE AND SODIUM CHLORIDE 5; .3 G/100ML; G/100ML
1000 INJECTION, SOLUTION INTRAVENOUS
Refills: 0 | Status: DISCONTINUED | OUTPATIENT
Start: 2024-07-01 | End: 2024-07-10

## 2024-07-01 RX ORDER — OXYCODONE HYDROCHLORIDE 100 MG/5ML
5 SOLUTION ORAL ONCE
Refills: 0 | Status: DISCONTINUED | OUTPATIENT
Start: 2024-07-01 | End: 2024-07-01

## 2024-07-01 RX ORDER — DIPHENHYDRAMINE HYDROCHLORIDE AND LIDOCAINE HYDROCHLORIDE AND ALUMINUM HYDROXIDE AND MAGNESIUM HYDRO
15 KIT THREE TIMES A DAY
Refills: 0 | Status: DISCONTINUED | OUTPATIENT
Start: 2024-07-01 | End: 2024-07-10

## 2024-07-01 RX ORDER — DEXTROSE MONOHYDRATE AND SODIUM CHLORIDE 5; .3 G/100ML; G/100ML
1000 INJECTION, SOLUTION INTRAVENOUS ONCE
Refills: 0 | Status: COMPLETED | OUTPATIENT
Start: 2024-07-01 | End: 2024-07-01

## 2024-07-01 RX ORDER — OLANZAPINE 2.5 MG/1
5 TABLET, FILM COATED ORAL DAILY
Refills: 0 | Status: DISCONTINUED | OUTPATIENT
Start: 2024-07-01 | End: 2024-07-10

## 2024-07-01 RX ORDER — BISACODYL 5 MG
5 TABLET, DELAYED RELEASE (ENTERIC COATED) ORAL DAILY
Refills: 0 | Status: DISCONTINUED | OUTPATIENT
Start: 2024-07-01 | End: 2024-07-10

## 2024-07-01 RX ORDER — DEXTROSE MONOHYDRATE AND SODIUM CHLORIDE 5; .3 G/100ML; G/100ML
1000 INJECTION, SOLUTION INTRAVENOUS
Refills: 0 | Status: DISCONTINUED | OUTPATIENT
Start: 2024-07-01 | End: 2024-07-01

## 2024-07-01 RX ORDER — POLYETHYLENE GLYCOL 3350 1 G/G
17 POWDER ORAL DAILY
Refills: 0 | Status: DISCONTINUED | OUTPATIENT
Start: 2024-07-01 | End: 2024-07-10

## 2024-07-01 RX ORDER — MAGNESIUM OXIDE 400 MG/1
400 TABLET ORAL DAILY
Refills: 0 | Status: DISCONTINUED | OUTPATIENT
Start: 2024-07-01 | End: 2024-07-03

## 2024-07-01 RX ORDER — INSULIN LISPRO 100 [IU]/ML
INJECTION, SOLUTION SUBCUTANEOUS AT BEDTIME
Refills: 0 | Status: DISCONTINUED | OUTPATIENT
Start: 2024-07-01 | End: 2024-07-10

## 2024-07-01 RX ORDER — FUROSEMIDE 10 MG/ML
1 INJECTION, SOLUTION INTRAMUSCULAR; INTRAVENOUS
Refills: 0 | DISCHARGE

## 2024-07-01 RX ORDER — DEXTROSE 30 % IN WATER 30 %
25 VIAL (ML) INTRAVENOUS ONCE
Refills: 0 | Status: DISCONTINUED | OUTPATIENT
Start: 2024-07-01 | End: 2024-07-10

## 2024-07-01 RX ORDER — DEXTROSE 30 % IN WATER 30 %
15 VIAL (ML) INTRAVENOUS ONCE
Refills: 0 | Status: DISCONTINUED | OUTPATIENT
Start: 2024-07-01 | End: 2024-07-10

## 2024-07-01 RX ORDER — DEXTROSE 30 % IN WATER 30 %
12.5 VIAL (ML) INTRAVENOUS ONCE
Refills: 0 | Status: DISCONTINUED | OUTPATIENT
Start: 2024-07-01 | End: 2024-07-10

## 2024-07-01 RX ADMIN — OXYCODONE HYDROCHLORIDE 5 MILLIGRAM(S): 100 SOLUTION ORAL at 18:52

## 2024-07-01 RX ADMIN — ATORVASTATIN CALCIUM 40 MILLIGRAM(S): 20 TABLET, FILM COATED ORAL at 22:00

## 2024-07-01 RX ADMIN — OXYCODONE HYDROCHLORIDE 10 MILLIGRAM(S): 100 SOLUTION ORAL at 22:00

## 2024-07-01 RX ADMIN — OXYCODONE HYDROCHLORIDE 10 MILLIGRAM(S): 100 SOLUTION ORAL at 23:00

## 2024-07-01 RX ADMIN — Medication 25 GRAM(S): at 19:48

## 2024-07-01 RX ADMIN — OXYCODONE HYDROCHLORIDE 5 MILLIGRAM(S): 100 SOLUTION ORAL at 17:02

## 2024-07-01 RX ADMIN — DEXTROSE MONOHYDRATE AND SODIUM CHLORIDE 1000 MILLILITER(S): 5; .3 INJECTION, SOLUTION INTRAVENOUS at 16:30

## 2024-07-01 RX ADMIN — Medication 2 TABLET(S): at 22:00

## 2024-07-01 NOTE — ED PROVIDER NOTE - CLINICAL SUMMARY MEDICAL DECISION MAKING FREE TEXT BOX
Kasey: Throat cancer. Today, at Onc., felt weak/pre-syncopal. No change in chronic neck pain. No F/chills. Check Hb and electrolytes, EKG and trop. IVF.

## 2024-07-01 NOTE — H&P ADULT - PROBLEM SELECTOR PLAN 1
- admit to Dr. Diaz ENT   - IVF, NGT (confirm with CXR) + PO diet as tolerates  - med consult - hx DVT and DMII stopped taking all meds x 3 weeks  - tele floor d/t syncope   - start magic mouth wash

## 2024-07-01 NOTE — H&P ADULT - NSHPLABSRESULTS_GEN_ALL_CORE
CBC:            8.0    1.98  )-----------( 21       ( 07-01-24 @ 14:30 )             23.2         Chem:         ( 07-01-24 @ 14:30 )    136  |  97<L>  |  24<H>  ----------------------------<  175<H>  4.8   |  30  |  0.82        Liver Functions: ( 07-01-24 @ 14:30 )  Alb: 3.6 g/dL / Pro: 8.4 g/dL / ALK PHOS: 88 U/L / ALT: 10 U/L / AST: 10 U/L / GGT: x              Type & Screen:

## 2024-07-01 NOTE — H&P ADULT - NSHPPHYSICALEXAM_GEN_ALL_CORE
T(C): 37.3 (07-01-24 @ 16:25), Max: 37.3 (07-01-24 @ 16:25)  HR: 94 (07-01-24 @ 16:25) (94 - 100)  BP: 119/91 (07-01-24 @ 16:25) (108/77 - 130/69)  RR: 18 (07-01-24 @ 16:25) (18 - 18)  SpO2: 100% (07-01-24 @ 16:25) (96% - 100%)        T(C): 37.3 (07-01-24 @ 16:25), Max: 37.3 (07-01-24 @ 16:25)  HR: 94 (07-01-24 @ 16:25) (94 - 100)  BP: 119/91 (07-01-24 @ 16:25) (108/77 - 130/69)  RR: 18 (07-01-24 @ 16:25) (18 - 18)  SpO2: 100% (07-01-24 @ 16:25) (96% - 100%)     PHYSICAL EXAM:  Gen: NAD  Skin: No rashes, bruises, or lesions  Head: Normocephalic, Atraumatic  Face: no edema, erythema, or fluctuance.   Eyes: no scleral injection  Nose: Nares bilaterally patent, no discharge  Mouth: No stridor, no drooling, no trismus.  Mucosa moist, OP clear, tonsillar fullness noted b/l   Neck: Flat, post RT changes noted to skin   Resp: breathing easily, no stridor  CV: no peripheral edema/cyanosis  GI: nondistended

## 2024-07-01 NOTE — PATIENT PROFILE ADULT - FALL HARM RISK - RISK INTERVENTIONS

## 2024-07-01 NOTE — CONSULT NOTE ADULT - PROBLEM SELECTOR RECOMMENDATION 3
Chronic stable  Hx of RLE DVT 2023  Restart Eliquis   Monitor for signs/symptoms of bleeding given low plt count

## 2024-07-01 NOTE — ED PROVIDER NOTE - OBJECTIVE STATEMENT
Kasey: Throat cancer. Today, at Onc., felt weak/pre-syncopal. No change in chronic neck pain. No F/chills. Sent by ENT Onc Emily for admission.

## 2024-07-01 NOTE — ED ADULT NURSE NOTE - OBJECTIVE STATEMENT
Break RN: Pt is a 59 y/o Male, A&Ox4, ambulatory with a PHx of DM and s/p chemotherapy and radiation for cancer of neck (pt unable to state name of cancer). Pt presents to the ED with c/o worsening neck and throat pain despite taking pain medications. Pt reports decreased PO intake due to pain. Pt also reports stopping diabetes medication due to pain swallowing pills and not eating. Pt also endorses weight loss of unknown amount. Pt has redness and discoloration to left side of neck and chest. Pt completing full sentences and denies any difficulty breathing or swallowing. Respirations even and unlabored, chest rise equal b/l. Sinus rhythm noted on cardiac monitor. VS as noted in flow sheets. Pt denies chest pain, SOB, fever, cough, chills, abdominal pain, N/V/D, h/a, dizziness, numbness/tingling or any urinary symptoms at this time. 20g IVL placed in left forearm. Labs collected and sent. Fluids administered as ordered, see MAR. No acute distress noted. Safety maintained throughout.

## 2024-07-01 NOTE — ED ADULT NURSE REASSESSMENT NOTE - NS ED NURSE REASSESS COMMENT FT1
Handoff receive pt in bed, awake, A*Ox4, NAD, pt in bed with NGT to right nare, burn marks from radication on chest, respirations even unlabored,  stretcher in lowest position, 20g in right forearm no signs of infiltration,  call bell in reach,

## 2024-07-01 NOTE — CONSULT NOTE ADULT - PROBLEM SELECTOR RECOMMENDATION 5
Chronic moderate exacerbation     Hold home metformin 1g BID Jardiance 25mg daily   LDCS with diabetic diet  A1c pending

## 2024-07-01 NOTE — H&P ADULT - HISTORY OF PRESENT ILLNESS
HPI:   57 y/o M  pmhx DMII (on metformin last took x 3 weeks ago)  and DVT (11/2023 - on Eliquis last took x 3 weeks ago), s/p Left neck Level II FNA 1/5/24 showed metastatic nasopharyngeal carcinoma, MRI at  on 1/30/24 showed metastatic adenopathy involving jugular chains bilaterally with bulky L palatine tonsil.      completed CXRT 6/24/2024  sent to the ED from ENT clinic d/t syncopal episode at 2pm (7/1/2024). ENT consulted. Patient seen and examined. Patient explained decreased PO intake x 2 weeks - was taking 4-5 ensures with each meal but yesterday only had 1/2 of one ensure. Patient c/o pain to neck and throat. Patient explained syncopal episode, was at clinic and got up to use bathroom, was accompined by brother- patient brother caught him - no LOC no head hit (per patient) then patient sent to ED

## 2024-07-01 NOTE — CONSULT NOTE ADULT - SUBJECTIVE AND OBJECTIVE BOX
58 yr old male with a pmh of HTN, HLD, T2DM not on insulin, DVT (11/2023 - on Eliquis), s/p Left neck Level II FNA 1/5/24 showed metastatic nasopharyngeal carcinoma, MRI at  on 1/30/24 showed metastatic adenopathy involving jugular chains bilaterally with bulky L palatine tonsil, completed CXRT 6/24/2024- has not taken his medications for 2-3 weeks due to difficulty swallowing, who presents with a presyncopal episode. PT reports he was at his ENT office f/u appt today and after using the bathroom to urinate he felt lightheaded and his family caught him before he fell. Pt reports decreased PO intake due to pain with swallowing.   Denies  headache, chest pain, palpitations, SOB, abdominal pain, joint pain, diarrhea/constipation, urinary symptoms.       T(C): 37.3 (07-01-24 @ 16:25), Max: 37.3 (07-01-24 @ 16:25)  HR: 94 (07-01-24 @ 16:25) (94 - 100)  BP: 119/91 (07-01-24 @ 16:25) (108/77 - 130/69)  RR: 18 (07-01-24 @ 16:25) (18 - 18)  SpO2: 100% (07-01-24 @ 16:25) (96% - 100%)  Wt(kg): --  I&O's Summary      REVIEW OF SYSTEMS:    CONSTITUTIONAL: No weakness, fevers or chills +near syncope  EYES/ENT: No visual changes;  + dysphagia; No sore throat; No rhinorrhea; No sinus pain/pressure  NECK: No pain or stiffness  RESPIRATORY: No cough, wheezing, hemoptysis; No shortness of breath  CARDIOVASCULAR: No chest pain or palpitations; No lower extremity edema  GASTROINTESTINAL: No abdominal or epigastric pain. No nausea, vomiting, or hematemesis; No diarrhea or constipation. No melena or hematochezia.  GENITOURINARY: No dysuria, frequency or hematuria  NEUROLOGICAL: No numbness or weakness  MSK: ambulates without assistance   SKIN: No itching, burning, rashes, or lesions   All other review of systems is negative unless indicated above.    PHYSICAL EXAM:  GENERAL: NAD, well-developed, well-nourished  HEAD:  Atraumatic, Normocephalic  EYES: EOMI, PERRL, conjunctiva and sclera clear  NECK: Supple, No JVD  CHEST/LUNG: Clear to auscultation bilaterally; No wheezes, rales or rhonchi  HEART: Regular rate and rhythm; No murmurs, rubs, or gallops, (+)S1, S2  ABDOMEN: Soft, Nontender, Nondistended; Normal Bowel sounds   EXTREMITIES:  2+ Peripheral Pulses, No clubbing, cyanosis, or edema  PSYCH: normal mood and affect  NEUROLOGY: AAOx3, CN 2-12 grossly intact, 5/5 strength in all extremities, no facial droop/slurred speech, gait not assessed   SKIN: No rashes or lesions    LABS:                        8.0    1.98  )-----------( 21       ( 01 Jul 2024 14:30 )             23.2     07-01    136  |  97<L>  |  24<H>  ----------------------------<  175<H>  4.8   |  30  |  0.82    Ca    9.2      01 Jul 2024 14:30  Phos  2.7     07-01  Mg     1.10     07-01    TPro  8.4<H>  /  Alb  3.6  /  TBili  0.3  /  DBili  x   /  AST  10  /  ALT  10  /  AlkPhos  88  07-01      Urinalysis Basic - ( 01 Jul 2024 14:30 )    Color: x / Appearance: x / SG: x / pH: x  Gluc: 175 mg/dL / Ketone: x  / Bili: x / Urobili: x   Blood: x / Protein: x / Nitrite: x   Leuk Esterase: x / RBC: x / WBC x   Sq Epi: x / Non Sq Epi: x / Bacteria: x      CAPILLARY BLOOD GLUCOSE      POCT Blood Glucose.: 198 mg/dL (01 Jul 2024 14:10)        Urinalysis Basic - ( 01 Jul 2024 14:30 )    Color: x / Appearance: x / SG: x / pH: x  Gluc: 175 mg/dL / Ketone: x  / Bili: x / Urobili: x   Blood: x / Protein: x / Nitrite: x   Leuk Esterase: x / RBC: x / WBC x   Sq Epi: x / Non Sq Epi: x / Bacteria: x      RADIOLOGY & ADDITIONAL TESTS:  EKG interpreted by myself: nonischemic   Imaging Personally Reviewed:  [x ] YES  [ ] NO  CXR interpreted by myself: no focal consolidation   Consultant(s) Notes Reviewed:  [x ] YES  [ ] NO      Care Discussed with Consultants/Other Providers [ x] YES  [ ] NO

## 2024-07-01 NOTE — H&P ADULT - ASSESSMENT
57 y/o M  pmhx DMII (on metformin last took x 3 weeks ago)  and DVT (11/2023 - on Eliquis last took x 3 weeks ago), s/p Left neck Level II FNA 1/5/24 showed metastatic nasopharyngeal carcinoma, MRI at  on 1/30/24 showed metastatic adenopathy involving jugular chains bilaterally with bulky L palatine tonsil.      completed CXRT 6/24/2024  sent to the ED from ENT clinic d/t syncopal episode at 2pm (7/1/2024). ENT consulted. Patient seen and examined. Patient explained decreased PO intake x 2 weeks - was taking 4-5 ensures with each meal but yesterday only had 1/2 of one ensure. Patient c/o pain to neck and throat. Patient explained syncopal episode, was at clinic and got up to use bathroom, was accompined by brother- patient brother caught him - no LOC no head hit (per patient) then patient sent to ED. NGT placed at bedside

## 2024-07-01 NOTE — ED PROVIDER NOTE - ATTENDING CONTRIBUTION TO CARE
I performed a face-to-face evaluation of the patient and performed a history and physical examination. I agree with the history and physical examination. If this was a PA visit, I personally saw the patient with the PA and performed a substantive portion of the visit including all aspects of the medical decision making.    Kasey: Throat cancer. Today, at Onc., felt weak/pre-syncopal. No change in chronic neck pain. No F/chills. Check Hb and electrolytes, EKG and trop. IVF.

## 2024-07-01 NOTE — ED ADULT NURSE NOTE - NSFALLUNIVINTERV_ED_ALL_ED
Bed/Stretcher in lowest position, wheels locked, appropriate side rails in place/Call bell, personal items and telephone in reach/Instruct patient to call for assistance before getting out of bed/chair/stretcher/Non-slip footwear applied when patient is off stretcher/[place] to call system/Physically safe environment - no spills, clutter or unnecessary equipment/Purposeful proactive rounding/Room/bathroom lighting operational, light cord in reach

## 2024-07-01 NOTE — ED ADULT TRIAGE NOTE - CHIEF COMPLAINT QUOTE
Patient brought to ER by EMS from MD office for failure to thrive. patient completed chemotherapy and radiation last week. Since then he has been unable to eat or drink. Sent for dehydrated .He has been taking his Oxy but no DM medication because he is not eating. Type 2 DM: FS: 198

## 2024-07-01 NOTE — CONSULT NOTE ADULT - PROBLEM SELECTOR RECOMMENDATION 9
New  Likely 2/2 decreased PO intake   -No prior TTE  - EKG nonischemic   * monitor on telemetry  * check orthostatics  * IV fluid hydration New  Likely 2/2 decreased PO intake   -No prior TTE  - EKG nonischemic, trop 20  Mg 1.10-> replete   * monitor on telemetry  * check orthostatics  * IV fluid hydration

## 2024-07-02 ENCOUNTER — APPOINTMENT (OUTPATIENT)
Dept: HEMATOLOGY ONCOLOGY | Facility: CLINIC | Age: 59
End: 2024-07-02

## 2024-07-02 LAB
ANION GAP SERPL CALC-SCNC: 15 MMOL/L — HIGH (ref 7–14)
BUN SERPL-MCNC: 21 MG/DL — SIGNIFICANT CHANGE UP (ref 7–23)
CALCIUM SERPL-MCNC: 8.7 MG/DL — SIGNIFICANT CHANGE UP (ref 8.4–10.5)
CHLORIDE SERPL-SCNC: 98 MMOL/L — SIGNIFICANT CHANGE UP (ref 98–107)
CO2 SERPL-SCNC: 23 MMOL/L — SIGNIFICANT CHANGE UP (ref 22–31)
CREAT SERPL-MCNC: 0.71 MG/DL — SIGNIFICANT CHANGE UP (ref 0.5–1.3)
EGFR: 107 ML/MIN/1.73M2 — SIGNIFICANT CHANGE UP
GLUCOSE BLDC GLUCOMTR-MCNC: 103 MG/DL — HIGH (ref 70–99)
GLUCOSE BLDC GLUCOMTR-MCNC: 130 MG/DL — HIGH (ref 70–99)
GLUCOSE BLDC GLUCOMTR-MCNC: 134 MG/DL — HIGH (ref 70–99)
GLUCOSE BLDC GLUCOMTR-MCNC: 141 MG/DL — HIGH (ref 70–99)
GLUCOSE BLDC GLUCOMTR-MCNC: 145 MG/DL — HIGH (ref 70–99)
GLUCOSE BLDC GLUCOMTR-MCNC: 146 MG/DL — HIGH (ref 70–99)
GLUCOSE SERPL-MCNC: 114 MG/DL — HIGH (ref 70–99)
MAGNESIUM SERPL-MCNC: 1.4 MG/DL — LOW (ref 1.6–2.6)
PHOSPHATE SERPL-MCNC: 3.2 MG/DL — SIGNIFICANT CHANGE UP (ref 2.5–4.5)
POTASSIUM SERPL-MCNC: 3.9 MMOL/L — SIGNIFICANT CHANGE UP (ref 3.5–5.3)
POTASSIUM SERPL-SCNC: 3.9 MMOL/L — SIGNIFICANT CHANGE UP (ref 3.5–5.3)
SODIUM SERPL-SCNC: 136 MMOL/L — SIGNIFICANT CHANGE UP (ref 135–145)

## 2024-07-02 PROCEDURE — 99233 SBSQ HOSP IP/OBS HIGH 50: CPT

## 2024-07-02 RX ORDER — APIXABAN 5 MG/1
5 TABLET, FILM COATED ORAL EVERY 12 HOURS
Refills: 0 | Status: DISCONTINUED | OUTPATIENT
Start: 2024-07-02 | End: 2024-07-10

## 2024-07-02 RX ORDER — MAGNESIUM OXIDE 400 MG/1
400 TABLET ORAL ONCE
Refills: 0 | Status: COMPLETED | OUTPATIENT
Start: 2024-07-02 | End: 2024-07-02

## 2024-07-02 RX ADMIN — LOSARTAN POTASSIUM 25 MILLIGRAM(S): 100 TABLET, FILM COATED ORAL at 05:25

## 2024-07-02 RX ADMIN — Medication 1 TABLET(S): at 12:29

## 2024-07-02 RX ADMIN — APIXABAN 5 MILLIGRAM(S): 5 TABLET, FILM COATED ORAL at 19:46

## 2024-07-02 RX ADMIN — OXYCODONE HYDROCHLORIDE 10 MILLIGRAM(S): 100 SOLUTION ORAL at 13:05

## 2024-07-02 RX ADMIN — OXYCODONE HYDROCHLORIDE 10 MILLIGRAM(S): 100 SOLUTION ORAL at 01:58

## 2024-07-02 RX ADMIN — MAGNESIUM OXIDE 400 MILLIGRAM(S): 400 TABLET ORAL at 12:28

## 2024-07-02 RX ADMIN — DIPHENHYDRAMINE HYDROCHLORIDE AND LIDOCAINE HYDROCHLORIDE AND ALUMINUM HYDROXIDE AND MAGNESIUM HYDRO 15 MILLILITER(S): KIT at 00:48

## 2024-07-02 RX ADMIN — APIXABAN 2.5 MILLIGRAM(S): 5 TABLET, FILM COATED ORAL at 05:26

## 2024-07-02 RX ADMIN — DIPHENHYDRAMINE HYDROCHLORIDE AND LIDOCAINE HYDROCHLORIDE AND ALUMINUM HYDROXIDE AND MAGNESIUM HYDRO 15 MILLILITER(S): KIT at 16:49

## 2024-07-02 RX ADMIN — ATORVASTATIN CALCIUM 40 MILLIGRAM(S): 20 TABLET, FILM COATED ORAL at 21:59

## 2024-07-02 RX ADMIN — Medication 650 MILLIGRAM(S): at 02:00

## 2024-07-02 RX ADMIN — DIPHENHYDRAMINE HYDROCHLORIDE AND LIDOCAINE HYDROCHLORIDE AND ALUMINUM HYDROXIDE AND MAGNESIUM HYDRO 15 MILLILITER(S): KIT at 08:23

## 2024-07-02 RX ADMIN — MAGNESIUM OXIDE 400 MILLIGRAM(S): 400 TABLET ORAL at 12:31

## 2024-07-02 RX ADMIN — OXYCODONE HYDROCHLORIDE 10 MILLIGRAM(S): 100 SOLUTION ORAL at 21:59

## 2024-07-02 RX ADMIN — Medication 650 MILLIGRAM(S): at 00:49

## 2024-07-02 RX ADMIN — OXYCODONE HYDROCHLORIDE 10 MILLIGRAM(S): 100 SOLUTION ORAL at 03:00

## 2024-07-02 RX ADMIN — OXYCODONE HYDROCHLORIDE 10 MILLIGRAM(S): 100 SOLUTION ORAL at 12:35

## 2024-07-02 RX ADMIN — OXYCODONE HYDROCHLORIDE 10 MILLIGRAM(S): 100 SOLUTION ORAL at 22:30

## 2024-07-02 NOTE — PROGRESS NOTE ADULT - SUBJECTIVE AND OBJECTIVE BOX
Pt seen & examined at bedside. Pain controlled, states that his symptoms have improved since his presentation yesterday. No complaints. No F/C, N/V, CP/SOB. Tolerating tube feeds through NGT.    PHYSICAL EXAM:    CONSTITUTIONAL: well nourished, well developed, and in no acute distress.    EYES: pupils equal and round and no abnormalities of the conjunctivae and lids.   RESPIRATORY: respirations unlabored, no increased work of breathing with use of accessory muscles and retractions. NO STRIDOR.    CARDIAC: warm extremities, no cyanosis.  ABDOMEN: nondistended.  THORAX: no gross deformities, no pectus defects.   NEUROLOGIC: cranial nerves II - VII and IX - XII with no gross deficits.   MUSCULOSKELETAL: normal muscle STRENGTH, symmetry and tone of facial, head and neck musculature, no clubbing.   INTEGUMENTARY: no obvious skin rash, no skin lesions.  LYMPHATIC: no cervical lymphadenopathy.   PSYCHIATRIC: age APPROPRIATE behavior.   HEAD: normocephalic, atraumatic.    RIGHT EAR: The right pinna was normal. The right external auditory canal was normal and the right TYMPANIC MEMBRANE was intact and well aerated.     LEFT EAR: The left pinna was normal. The left external auditory canal was normal and the left TYMPANIC MEMBRANE was intact and well aerated.     NOSE: External Nose: normal  Anterior Nasal Cavity: the right nasal cavity was normal and the left nasal cavity was normal. NGT is taped in place with tube feeds running.    NECK: normal with no obvious cervical lesions

## 2024-07-02 NOTE — DIETITIAN INITIAL EVALUATION ADULT - OTHER INFO
Pt is currently on pureed diet and tubefeeding Glucerna 1.5 Del @ 60mL/hr x24 hours (provides 1440mL volume formula, 2160kcal, 119gms protein, 1093mL free water meets 99% estimated EER >100% est protein requirement, additionally PO meals/ONS) via NGT, at time of visit feeding running at rate of 40mL/hr, recently advanced from 30mL/hr per RN. Additionally receiving glucerna shake cans TID, Would consider weaning from TF when pt able to meet 60% of EER from oral intake. Nutrition to follow. Pt currently denied c/o N/V/D or abdominal pain. Pt reported he did not attempt eating puree meal due to pain. ENT following. Last reported BM today.

## 2024-07-02 NOTE — DIETITIAN INITIAL EVALUATION ADULT - PERTINENT LABORATORY DATA
07-02    136  |  98  |  21  ----------------------------<  114<H>  3.9   |  23  |  0.71    Ca    8.7      02 Jul 2024 06:53  Phos  3.2     07-02  Mg     1.40     07-02    TPro  8.4<H>  /  Alb  3.6  /  TBili  0.3  /  DBili  x   /  AST  10  /  ALT  10  /  AlkPhos  88  07-01    A1C with Estimated Average Glucose Result: 6.6 % (07-01-24 @ 14:30)    CAPILLARY BLOOD GLUCOSE  POCT Blood Glucose.: 134 mg/dL (02 Jul 2024 11:47)  POCT Blood Glucose.: 141 mg/dL (02 Jul 2024 08:33)  POCT Blood Glucose.: 130 mg/dL (02 Jul 2024 06:58)  POCT Blood Glucose.: 103 mg/dL (02 Jul 2024 00:44)  POCT Blood Glucose.: 108 mg/dL (01 Jul 2024 20:41)  POCT Blood Glucose.: 198 mg/dL (01 Jul 2024 14:10)

## 2024-07-02 NOTE — DIETITIAN INITIAL EVALUATION ADULT - ORAL NUTRITION SUPPLEMENTS
Would recommend decreasing provision of Glucerna shake to once daily to encourage appetite for PO intake of meals (note tubefeeding regimen meets 100% of estimated nutrition requirements)

## 2024-07-02 NOTE — PHYSICAL THERAPY INITIAL EVALUATION ADULT - GENERAL OBSERVATIONS, REHAB EVAL
Upon entry, pt semi-supine in bed in NAD; + NGT, + telemetry. HR 88 bpm. Pt left as received with all tubes/lines intact, call bell in reach and in NAD.

## 2024-07-02 NOTE — DIETITIAN INITIAL EVALUATION ADULT - EDUCATION DIETARY MODIFICATIONS
Encouraged meals/ONS, discussed recommendations to help increase PO intake (ie cold foods, small frequent meals, avoiding spicy or tart or crunchy/sticky foods, oral supplements)/(2) meets goals/outcomes/verbalization

## 2024-07-02 NOTE — PROGRESS NOTE ADULT - SUBJECTIVE AND OBJECTIVE BOX
[facility] Division of Hospital Medicine  Rosemary Rodriguez MD  Pager (K-F, 6C-5H): [phone]  Other Times:  [phone]    Patient is a 58y old  Male who presents with a chief complaint of Dehyrdation (02 Jul 2024 10:11)      SUBJECTIVE / OVERNIGHT EVENTS: intermittent pain improves with magic mouth wash and PO Oxy        MEDICATIONS  (STANDING):  apixaban 2.5 milliGRAM(s) Oral every 12 hours  atorvastatin 40 milliGRAM(s) Oral at bedtime  dextrose 10% Bolus 125 milliLiter(s) IV Bolus once  dextrose 5%. 1000 milliLiter(s) (100 mL/Hr) IV Continuous <Continuous>  dextrose 5%. 1000 milliLiter(s) (50 mL/Hr) IV Continuous <Continuous>  dextrose 50% Injectable 25 Gram(s) IV Push once  dextrose 50% Injectable 12.5 Gram(s) IV Push once  FIRST- Mouthwash  BLM 15 milliLiter(s) Swish and Swallow three times a day  glucagon  Injectable 1 milliGRAM(s) IntraMuscular once  insulin lispro (ADMELOG) corrective regimen sliding scale   SubCutaneous at bedtime  insulin lispro (ADMELOG) corrective regimen sliding scale   SubCutaneous three times a day before meals  losartan 25 milliGRAM(s) Oral daily  magnesium oxide 400 milliGRAM(s) Oral daily  magnesium oxide 400 milliGRAM(s) Oral once  multivitamin 1 Tablet(s) Oral daily  naloxone Injectable 0.4 milliGRAM(s) IV Push once  polyethylene glycol 3350 17 Gram(s) Oral daily  senna 2 Tablet(s) Oral at bedtime    MEDICATIONS  (PRN):  acetaminophen   Oral Liquid .. 650 milliGRAM(s) Oral every 6 hours PRN Temp greater or equal to 38C (100.4F), Mild Pain (1 - 3)  bisacodyl 5 milliGRAM(s) Oral daily PRN Constipation  dextrose Oral Gel 15 Gram(s) Oral once PRN Blood Glucose LESS THAN 70 milliGRAM(s)/deciliter  OLANZapine 5 milliGRAM(s) Oral daily PRN for nausea  ondansetron    Tablet 8 milliGRAM(s) Oral every 8 hours PRN for nausea  oxyCODONE    IR 7.5 milliGRAM(s) Oral every 4 hours PRN Moderate Pain (4 - 6)  oxyCODONE    IR 10 milliGRAM(s) Oral every 4 hours PRN Severe Pain (7 - 10)      CAPILLARY BLOOD GLUCOSE      POCT Blood Glucose.: 134 mg/dL (02 Jul 2024 11:47)  POCT Blood Glucose.: 141 mg/dL (02 Jul 2024 08:33)  POCT Blood Glucose.: 130 mg/dL (02 Jul 2024 06:58)  POCT Blood Glucose.: 103 mg/dL (02 Jul 2024 00:44)  POCT Blood Glucose.: 108 mg/dL (01 Jul 2024 20:41)  POCT Blood Glucose.: 198 mg/dL (01 Jul 2024 14:10)    I&O's Summary    01 Jul 2024 07:01  -  02 Jul 2024 07:00  --------------------------------------------------------  IN: 570 mL / OUT: 350 mL / NET: 220 mL        PHYSICAL EXAM:  Vital Signs Last 24 Hrs  T(C): 36.8 (02 Jul 2024 08:22), Max: 37.3 (01 Jul 2024 16:25)  T(F): 98.2 (02 Jul 2024 08:22), Max: 99.1 (01 Jul 2024 16:25)  HR: 90 (02 Jul 2024 08:22) (86 - 100)  BP: 135/97 (02 Jul 2024 08:22) (108/77 - 147/90)  BP(mean): --  RR: 18 (02 Jul 2024 08:22) (18 - 18)  SpO2: 98% (02 Jul 2024 08:22) (96% - 100%)    Parameters below as of 02 Jul 2024 08:22  Patient On (Oxygen Delivery Method): room air    GENERAL: NAD, well-developed, well-nourished  HEAD: NGT  EYES: EOMI, conjunctiva and sclera clear  CHEST/LUNG: Clear to auscultation bilaterally;  HEART: Regular rate and rhythm; No murmurs, rubs, or gallops, (+)S1, S2  ABDOMEN: Soft, Nontender, Nondistended; Normal Bowel sounds   EXTREMITIES:  2+ Peripheral Pulses, No clubbing, cyanosis, or edema  NEUROLOGY: AAOx3, CN 2-12 grossly intact, 5/5 strength in all extremities    LABS:                        8.0    1.98  )-----------( 21       ( 01 Jul 2024 14:30 )             23.2     07-02    136  |  98  |  21  ----------------------------<  114<H>  3.9   |  23  |  0.71    Ca    8.7      02 Jul 2024 06:53  Phos  3.2     07-02  Mg     1.40     07-02    TPro  8.4<H>  /  Alb  3.6  /  TBili  0.3  /  DBili  x   /  AST  10  /  ALT  10  /  AlkPhos  88  07-01          Urinalysis Basic - ( 02 Jul 2024 06:53 )    Color: x / Appearance: x / SG: x / pH: x  Gluc: 114 mg/dL / Ketone: x  / Bili: x / Urobili: x   Blood: x / Protein: x / Nitrite: x   Leuk Esterase: x / RBC: x / WBC x   Sq Epi: x / Non Sq Epi: x / Bacteria: x          RADIOLOGY & ADDITIONAL TESTS:  Results Reviewed: < from: Xray Chest 1 View- PORTABLE-Urgent (Xray Chest 1 View- PORTABLE-Urgent .) (07.01.24 @ 20:47) >    IMPRESSION:  Enteric tube coursing below the diaphragm with side port in the stomach.    --- End of Report ---    < end of copied text >    Imaging Personally Reviewed:  Electrocardiogram Personally Reviewed:    COORDINATION OF CARE:  Care Discussed with Consultants/Other Providers [Y/N]:  Prior or Outpatient Records Reviewed [Y/N]:

## 2024-07-02 NOTE — DIETITIAN INITIAL EVALUATION ADULT - REASON FOR ADMISSION
59 yo M PMHx DMII (on metformin last took x 3 weeks ago)  and DVT (11/2023 - on Eliquis last took x 3 weeks ago), s/p Left neck Level II FNA 1/5/24 showed metastatic nasopharyngeal carcinoma, (MRI at  on 1/30/24 showed metastatic adenopathy involving jugular chains bilaterally with bulky L palatine tonsil. completed CXRT 6/24/2024 ) p/w near syncopal episode in bathroom. 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

## 2024-07-02 NOTE — DIETITIAN INITIAL EVALUATION ADULT - PERTINENT MEDS FT
MEDICATIONS  (STANDING):  apixaban 5 milliGRAM(s) Oral every 12 hours  atorvastatin 40 milliGRAM(s) Oral at bedtime  dextrose 10% Bolus 125 milliLiter(s) IV Bolus once  dextrose 5%. 1000 milliLiter(s) (100 mL/Hr) IV Continuous <Continuous>  dextrose 5%. 1000 milliLiter(s) (50 mL/Hr) IV Continuous <Continuous>  dextrose 50% Injectable 25 Gram(s) IV Push once  dextrose 50% Injectable 12.5 Gram(s) IV Push once  FIRST- Mouthwash  BLM 15 milliLiter(s) Swish and Swallow three times a day  glucagon  Injectable 1 milliGRAM(s) IntraMuscular once  insulin lispro (ADMELOG) corrective regimen sliding scale   SubCutaneous three times a day before meals  insulin lispro (ADMELOG) corrective regimen sliding scale   SubCutaneous at bedtime  losartan 25 milliGRAM(s) Oral daily  magnesium oxide 400 milliGRAM(s) Oral daily  multivitamin 1 Tablet(s) Oral daily  naloxone Injectable 0.4 milliGRAM(s) IV Push once  polyethylene glycol 3350 17 Gram(s) Oral daily  senna 2 Tablet(s) Oral at bedtime    MEDICATIONS  (PRN):  acetaminophen   Oral Liquid .. 650 milliGRAM(s) Oral every 6 hours PRN Temp greater or equal to 38C (100.4F), Mild Pain (1 - 3)  bisacodyl 5 milliGRAM(s) Oral daily PRN Constipation  dextrose Oral Gel 15 Gram(s) Oral once PRN Blood Glucose LESS THAN 70 milliGRAM(s)/deciliter  OLANZapine 5 milliGRAM(s) Oral daily PRN for nausea  ondansetron    Tablet 8 milliGRAM(s) Oral every 8 hours PRN for nausea  oxyCODONE    IR 7.5 milliGRAM(s) Oral every 4 hours PRN Moderate Pain (4 - 6)  oxyCODONE    IR 10 milliGRAM(s) Oral every 4 hours PRN Severe Pain (7 - 10)

## 2024-07-02 NOTE — DIETITIAN INITIAL EVALUATION ADULT - NSFNSGIIOFT_GEN_A_CORE
07-01-24 @ 07:01  -  07-02-24 @ 07:00  --------------------------------------------------------  OUT:  Total OUT: 0 mL    Total NET: 120 mL

## 2024-07-02 NOTE — DIETITIAN INITIAL EVALUATION ADULT - NS FNS DIET ORDER
Diet, Pureed:   Consistent Carbohydrate {Evening Snack} (CSTCHOSN)  Tube Feeding Modality: Nasogastric  Glucerna 1.5 Del (GLUCERNA1.5RTH)  Total Volume for 24 Hours (mL): 1440  Continuous  Starting Tube Feed Rate {mL per Hour}: 10  Increase Tube Feed Rate by (mL): 10     Every 4 hours  Until Goal Tube Feed Rate (mL per Hour): 60  Tube Feed Duration (in Hours): 24  Tube Feed Start Time: 22:00  Free Water Flush  Bolus   Total Volume per Flush (mL): 300   Frequency: Every 6 Hours  Supplement Feeding Modality:  Oral  Glucerna Shake Cans or Servings Per Day:  3       Frequency:  Daily (07-01-24 @ 21:33)

## 2024-07-02 NOTE — DIETITIAN INITIAL EVALUATION ADULT - ORAL INTAKE PTA/DIET HISTORY
Pt reported being unable to eat due to pain in throat, reported only having Ensures (5 bottles per day, usually able to consume all 5, unable to specify macronutrient calorie/protein content). Reported decreased PO intake of meals started since starting radation therapy 1.5 months ago. Reportd no known food allergies. Denied concerns for N/V/D or abdominal pain. Reported concerns for weight loss reported uBW 240 lbs-250lbs (1.5 months ago) now believes he is likely 190 lbs. Reflects 20-24% weight loss based on reported UBW and current chart weight of 188.4 lbs (85.5 kg; dosing wt) within 1.5 months time frame.   Wt trend per   [facility] HIE :212 lbs (2/20/2024) 216 lbs (3/27/2024) 213 lbs (4/16/2024) 212lbs (5/1/2024) 207 lbs (3/31/2024) 196 lbs (6/20/2024), reflects significant weight loss of 11% over 3 month time frame (213 lbs 4/16 to current chart wt).

## 2024-07-02 NOTE — DIETITIAN INITIAL EVALUATION ADULT - ENTERAL
Continue current tubefeeding rx: Glucerna 1.5 Del @ 60mL/hr x24 hours (provides 1440mL volume formula, 2160kcal, 119gms protein, 1093mL free water)   Defer free water flush per medical team  Recommend weaning of tubefeeding as PO intake increases, pt encouraged meals/ONS  Discontinue TF when pt able to meet ~60% of EER on PO diet

## 2024-07-02 NOTE — PHYSICAL THERAPY INITIAL EVALUATION ADULT - PERTINENT HX OF CURRENT PROBLEM, REHAB EVAL
Pt is a 58 year old male presenting from outpatient ENT office s/p syncopal episode. Patient with known metastatic nasopharyngeal carcinoma and metastatic adenopathy involving jugular chains bilaterally with bulky L palatine tonsil. Pt with recent c/o decreased PO intake and pain to neck and throat. CXR clear lungs. Pt admitted for syncopal episode likely secondary to decreased PO intake in setting of recent radiation (radiation mucositis).

## 2024-07-02 NOTE — PHYSICAL THERAPY INITIAL EVALUATION ADULT - PATIENT PROFILE REVIEW, REHAB EVAL
PT initial evaluation received and chart review completed. Pt agreeable to participate in PT evaluation. ACTIVITY: OOB TO CHAIR/yes

## 2024-07-02 NOTE — DIETITIAN INITIAL EVALUATION ADULT - ADD RECOMMEND
Monitor weights, labs, BM's, skin integrity, p.o. intake/EN tolerance  Please monitor % PO intake on flowsheets

## 2024-07-03 LAB
ADD ON TEST-SPECIMEN IN LAB: SIGNIFICANT CHANGE UP
ADD ON TEST-SPECIMEN IN LAB: SIGNIFICANT CHANGE UP
ANION GAP SERPL CALC-SCNC: 12 MMOL/L — SIGNIFICANT CHANGE UP (ref 7–14)
BASOPHILS # BLD AUTO: 0 K/UL — SIGNIFICANT CHANGE UP (ref 0–0.2)
BASOPHILS NFR BLD AUTO: 0 % — SIGNIFICANT CHANGE UP (ref 0–2)
BLD GP AB SCN SERPL QL: NEGATIVE — SIGNIFICANT CHANGE UP
BUN SERPL-MCNC: 18 MG/DL — SIGNIFICANT CHANGE UP (ref 7–23)
CALCIUM SERPL-MCNC: 9 MG/DL — SIGNIFICANT CHANGE UP (ref 8.4–10.5)
CHLORIDE SERPL-SCNC: 99 MMOL/L — SIGNIFICANT CHANGE UP (ref 98–107)
CO2 SERPL-SCNC: 27 MMOL/L — SIGNIFICANT CHANGE UP (ref 22–31)
CREAT SERPL-MCNC: 0.66 MG/DL — SIGNIFICANT CHANGE UP (ref 0.5–1.3)
EGFR: 109 ML/MIN/1.73M2 — SIGNIFICANT CHANGE UP
EOSINOPHIL # BLD AUTO: 0 K/UL — SIGNIFICANT CHANGE UP (ref 0–0.5)
EOSINOPHIL NFR BLD AUTO: 0 % — SIGNIFICANT CHANGE UP (ref 0–6)
FERRITIN SERPL-MCNC: 550 NG/ML — HIGH (ref 30–400)
FOLATE SERPL-MCNC: 14.8 NG/ML — SIGNIFICANT CHANGE UP (ref 3.1–17.5)
GLUCOSE BLDC GLUCOMTR-MCNC: 124 MG/DL — HIGH (ref 70–99)
GLUCOSE BLDC GLUCOMTR-MCNC: 135 MG/DL — HIGH (ref 70–99)
GLUCOSE BLDC GLUCOMTR-MCNC: 136 MG/DL — HIGH (ref 70–99)
GLUCOSE BLDC GLUCOMTR-MCNC: 153 MG/DL — HIGH (ref 70–99)
GLUCOSE SERPL-MCNC: 113 MG/DL — HIGH (ref 70–99)
HCT VFR BLD CALC: 21.8 % — LOW (ref 39–50)
HCT VFR BLD CALC: 21.8 % — LOW (ref 39–50)
HCT VFR BLD CALC: 22 % — LOW (ref 39–50)
HGB BLD-MCNC: 7.3 G/DL — LOW (ref 13–17)
HGB BLD-MCNC: 7.5 G/DL — LOW (ref 13–17)
HGB BLD-MCNC: 7.6 G/DL — LOW (ref 13–17)
IANC: 0.97 K/UL — LOW (ref 1.8–7.4)
IMM GRANULOCYTES NFR BLD AUTO: 0 % — SIGNIFICANT CHANGE UP (ref 0–0.9)
IRON SATN MFR SERPL: 27 % — SIGNIFICANT CHANGE UP (ref 14–50)
IRON SATN MFR SERPL: 46 UG/DL — SIGNIFICANT CHANGE UP (ref 45–165)
LYMPHOCYTES # BLD AUTO: 0.17 K/UL — LOW (ref 1–3.3)
LYMPHOCYTES # BLD AUTO: 12.5 % — LOW (ref 13–44)
MAGNESIUM SERPL-MCNC: 1.3 MG/DL — LOW (ref 1.6–2.6)
MCHC RBC-ENTMCNC: 31.3 PG — SIGNIFICANT CHANGE UP (ref 27–34)
MCHC RBC-ENTMCNC: 32.2 PG — SIGNIFICANT CHANGE UP (ref 27–34)
MCHC RBC-ENTMCNC: 32.8 PG — SIGNIFICANT CHANGE UP (ref 27–34)
MCHC RBC-ENTMCNC: 33.2 GM/DL — SIGNIFICANT CHANGE UP (ref 32–36)
MCHC RBC-ENTMCNC: 34.4 GM/DL — SIGNIFICANT CHANGE UP (ref 32–36)
MCHC RBC-ENTMCNC: 34.9 GM/DL — SIGNIFICANT CHANGE UP (ref 32–36)
MCV RBC AUTO: 93.6 FL — SIGNIFICANT CHANGE UP (ref 80–100)
MCV RBC AUTO: 94 FL — SIGNIFICANT CHANGE UP (ref 80–100)
MCV RBC AUTO: 94.4 FL — SIGNIFICANT CHANGE UP (ref 80–100)
MONOCYTES # BLD AUTO: 0.22 K/UL — SIGNIFICANT CHANGE UP (ref 0–0.9)
MONOCYTES NFR BLD AUTO: 16.2 % — HIGH (ref 2–14)
NEUTROPHILS # BLD AUTO: 0.97 K/UL — LOW (ref 1.8–7.4)
NEUTROPHILS NFR BLD AUTO: 71.3 % — SIGNIFICANT CHANGE UP (ref 43–77)
NRBC # BLD: 0 /100 WBCS — SIGNIFICANT CHANGE UP (ref 0–0)
NRBC # FLD: 0 K/UL — SIGNIFICANT CHANGE UP (ref 0–0)
PHOSPHATE SERPL-MCNC: 3 MG/DL — SIGNIFICANT CHANGE UP (ref 2.5–4.5)
PLATELET # BLD AUTO: 17 K/UL — CRITICAL LOW (ref 150–400)
PLATELET # BLD AUTO: 20 K/UL — CRITICAL LOW (ref 150–400)
PLATELET # BLD AUTO: 21 K/UL — LOW (ref 150–400)
POTASSIUM SERPL-MCNC: 4 MMOL/L — SIGNIFICANT CHANGE UP (ref 3.5–5.3)
POTASSIUM SERPL-SCNC: 4 MMOL/L — SIGNIFICANT CHANGE UP (ref 3.5–5.3)
RBC # BLD: 2.32 M/UL — LOW (ref 4.2–5.8)
RBC # BLD: 2.33 M/UL — LOW (ref 4.2–5.8)
RBC # FLD: 14.8 % — HIGH (ref 10.3–14.5)
RBC # FLD: 14.8 % — HIGH (ref 10.3–14.5)
RBC # FLD: 15 % — HIGH (ref 10.3–14.5)
RETICS #: 21.2 K/UL — LOW (ref 25–125)
RETICS/RBC NFR: 0.9 % — SIGNIFICANT CHANGE UP (ref 0.5–2.5)
RH IG SCN BLD-IMP: POSITIVE — SIGNIFICANT CHANGE UP
SODIUM SERPL-SCNC: 138 MMOL/L — SIGNIFICANT CHANGE UP (ref 135–145)
T4 FREE SERPL-MCNC: 1.5 NG/DL — SIGNIFICANT CHANGE UP (ref 0.9–1.8)
TIBC SERPL-MCNC: 170 UG/DL — LOW (ref 220–430)
TSH SERPL-MCNC: <0.1 UIU/ML — LOW (ref 0.27–4.2)
UIBC SERPL-MCNC: 124 UG/DL — SIGNIFICANT CHANGE UP (ref 110–370)
VIT B12 SERPL-MCNC: 654 PG/ML — SIGNIFICANT CHANGE UP (ref 200–900)
WBC # BLD: 0.97 K/UL — CRITICAL LOW (ref 3.8–10.5)
WBC # BLD: 1.26 K/UL — LOW (ref 3.8–10.5)
WBC # BLD: 1.36 K/UL — LOW (ref 3.8–10.5)
WBC # FLD AUTO: 0.97 K/UL — CRITICAL LOW (ref 3.8–10.5)
WBC # FLD AUTO: 1.26 K/UL — LOW (ref 3.8–10.5)
WBC # FLD AUTO: 1.36 K/UL — LOW (ref 3.8–10.5)

## 2024-07-03 PROCEDURE — 99232 SBSQ HOSP IP/OBS MODERATE 35: CPT

## 2024-07-03 PROCEDURE — 71045 X-RAY EXAM CHEST 1 VIEW: CPT | Mod: 26

## 2024-07-03 PROCEDURE — 71045 X-RAY EXAM CHEST 1 VIEW: CPT | Mod: 26,77

## 2024-07-03 RX ORDER — MAGNESIUM OXIDE 400 MG/1
400 TABLET ORAL EVERY 4 HOURS
Refills: 0 | Status: COMPLETED | OUTPATIENT
Start: 2024-07-03 | End: 2024-07-03

## 2024-07-03 RX ORDER — DEXTROSE MONOHYDRATE AND SODIUM CHLORIDE 5; .3 G/100ML; G/100ML
500 INJECTION, SOLUTION INTRAVENOUS ONCE
Refills: 0 | Status: COMPLETED | OUTPATIENT
Start: 2024-07-03 | End: 2024-07-03

## 2024-07-03 RX ADMIN — MAGNESIUM OXIDE 400 MILLIGRAM(S): 400 TABLET ORAL at 14:59

## 2024-07-03 RX ADMIN — MAGNESIUM OXIDE 400 MILLIGRAM(S): 400 TABLET ORAL at 09:01

## 2024-07-03 RX ADMIN — DIPHENHYDRAMINE HYDROCHLORIDE AND LIDOCAINE HYDROCHLORIDE AND ALUMINUM HYDROXIDE AND MAGNESIUM HYDRO 15 MILLILITER(S): KIT at 15:00

## 2024-07-03 RX ADMIN — DIPHENHYDRAMINE HYDROCHLORIDE AND LIDOCAINE HYDROCHLORIDE AND ALUMINUM HYDROXIDE AND MAGNESIUM HYDRO 15 MILLILITER(S): KIT at 21:45

## 2024-07-03 RX ADMIN — Medication 1 TABLET(S): at 12:36

## 2024-07-03 RX ADMIN — ATORVASTATIN CALCIUM 40 MILLIGRAM(S): 20 TABLET, FILM COATED ORAL at 21:46

## 2024-07-03 RX ADMIN — APIXABAN 5 MILLIGRAM(S): 5 TABLET, FILM COATED ORAL at 06:50

## 2024-07-03 RX ADMIN — DIPHENHYDRAMINE HYDROCHLORIDE AND LIDOCAINE HYDROCHLORIDE AND ALUMINUM HYDROXIDE AND MAGNESIUM HYDRO 15 MILLILITER(S): KIT at 06:49

## 2024-07-03 RX ADMIN — LOSARTAN POTASSIUM 25 MILLIGRAM(S): 100 TABLET, FILM COATED ORAL at 06:50

## 2024-07-03 RX ADMIN — APIXABAN 5 MILLIGRAM(S): 5 TABLET, FILM COATED ORAL at 17:22

## 2024-07-03 RX ADMIN — INSULIN LISPRO 1: 100 INJECTION, SOLUTION SUBCUTANEOUS at 12:33

## 2024-07-03 RX ADMIN — Medication 2 TABLET(S): at 21:46

## 2024-07-03 RX ADMIN — DEXTROSE MONOHYDRATE AND SODIUM CHLORIDE 250 MILLILITER(S): 5; .3 INJECTION, SOLUTION INTRAVENOUS at 11:13

## 2024-07-03 RX ADMIN — POLYETHYLENE GLYCOL 3350 17 GRAM(S): 1 POWDER ORAL at 12:36

## 2024-07-03 NOTE — PROVIDER CONTACT NOTE (OTHER) - ACTION/TREATMENT ORDERED:
Provider stated they will come by to reinsert NGT.
Team to assess patient at bedside. Replacement of tube.

## 2024-07-03 NOTE — PROGRESS NOTE ADULT - SUBJECTIVE AND OBJECTIVE BOX
Pt seen & examined at bedside. Pain controlled, pt tolerated some ensure shakes yesterday. No complaints. No F/C, N/V, CP/SOB. Tolerating tube feeds through NGT.    PHYSICAL EXAM:    CONSTITUTIONAL: well nourished, well developed, and in no acute distress.    EYES: pupils equal and round and no abnormalities of the conjunctivae and lids.   RESPIRATORY: respirations unlabored, no increased work of breathing with use of accessory muscles and retractions. NO STRIDOR.    CARDIAC: warm extremities, no cyanosis.  ABDOMEN: nondistended.  THORAX: no gross deformities, no pectus defects.   NEUROLOGIC: cranial nerves II - VII and IX - XII with no gross deficits.   MUSCULOSKELETAL: normal muscle STRENGTH, symmetry and tone of facial, head and neck musculature, no clubbing.   INTEGUMENTARY: no obvious skin rash, no skin lesions.  LYMPHATIC: no cervical lymphadenopathy.   PSYCHIATRIC: age APPROPRIATE behavior.   HEAD: normocephalic, atraumatic.    RIGHT EAR: The right pinna was normal.    LEFT EAR: The left pinna was normal.    NOSE: External Nose: normal  Anterior Nasal Cavity: the right nasal cavity was normal and the left nasal cavity was normal. NGT is taped in place with tube feeds running.    NECK: normal with no obvious cervical lesions  Pt seen & examined at bedside. Pt still reporting significant odynophagia but tolerated some ensure shakes yesterday. No F/C, N/V, CP/SOB. Tolerating tube feeds through NGT.    PHYSICAL EXAM:    CONSTITUTIONAL: well nourished, well developed, and in no acute distress.    EYES: pupils equal and round and no abnormalities of the conjunctivae and lids.   RESPIRATORY: respirations unlabored, no increased work of breathing with use of accessory muscles and retractions. NO STRIDOR.    CARDIAC: warm extremities, no cyanosis.  ABDOMEN: nondistended.  THORAX: no gross deformities, no pectus defects.   NEUROLOGIC: cranial nerves II - VII and IX - XII with no gross deficits.   MUSCULOSKELETAL: normal muscle STRENGTH, symmetry and tone of facial, head and neck musculature, no clubbing.   INTEGUMENTARY: no obvious skin rash, no skin lesions.  LYMPHATIC: no cervical lymphadenopathy.   PSYCHIATRIC: age APPROPRIATE behavior.   HEAD: normocephalic, atraumatic.    RIGHT EAR: The right pinna was normal.    LEFT EAR: The left pinna was normal.    NOSE: External Nose: normal  Anterior Nasal Cavity: the right nasal cavity was normal and the left nasal cavity was normal. NGT is taped in place with tube feeds running.    NECK: normal with no obvious cervical lesions

## 2024-07-03 NOTE — CHART NOTE - NSCHARTNOTEFT_GEN_A_CORE
58 year old M with h/o hypercholesterolemia, BPH and DM metastatic nasopharyngeal carcinoma, EBV +ve, (dx January 2024),  mets to jugular nodes and palatine tonsil . Oncology was consulted for pancytopenia.     Pt received one cycle of induction chemotherapy with gemcitabine and cisplatin which was discontinued for significant hematologic toxicity, thrombocytopenia to 22 requiring PLT transfusion as well as neutropenia with a WBC of 0.8.     s/p 7 cycles of weekly cisplatin , last received on 6/18       Admitted under ENT service for dehydration secondary to post-treatment mucositis on 7/1. Now tolerating tube feeds with PO supplements .      # Pancytopenia, chemo induced likely (got cisplatin on 6/18)  - trend daily cbc with Diff  - will review peripheral smear  - obtain LDH, haptoglobin  - obtain HIV, hepatitis panel, EBV, CMV  - US abdomen to r/o hepatomegaly  - obtain blue top platelets  - transfuse to keep hgb> 7  - Platelet transfuse if < 10k, if fever and count <20k, bleeding and count < 50k    Will follow blood counts peripherally, if improving no further workup. If further worsening, will write full consult note. At this time, no indication for G-CSF.     Brennan Galvan  Oncology Fellow, PGY4

## 2024-07-03 NOTE — PROVIDER CONTACT NOTE (OTHER) - BACKGROUND
Patient had NG tube taped to nose securely with tube feeds running at goal rate.
Pt admitted for weakness. Pt has been having low PO intake due to throat pain and had KO feed tube placed.

## 2024-07-03 NOTE — PROVIDER CONTACT NOTE (OTHER) - SITUATION
Notified provider that patient turned to their side when they were sleeping and accidentally pulled out their NG tube.
Pt KO tube came out.

## 2024-07-03 NOTE — PROGRESS NOTE ADULT - SUBJECTIVE AND OBJECTIVE BOX
[facility] Division of Hospital Medicine  Rosemary Rodriguez MD  Pager (R-F, 8F-2A): [phone]  Other Times:  [phone]    Patient is a 58y old  Male who presents with a chief complaint of Dehyrdation (03 Jul 2024 06:58)      SUBJECTIVE / OVERNIGHT EVENTS:  ADDITIONAL REVIEW OF SYSTEMS:    MEDICATIONS  (STANDING):  apixaban 5 milliGRAM(s) Oral every 12 hours  atorvastatin 40 milliGRAM(s) Oral at bedtime  dextrose 10% Bolus 125 milliLiter(s) IV Bolus once  dextrose 5%. 1000 milliLiter(s) (100 mL/Hr) IV Continuous <Continuous>  dextrose 5%. 1000 milliLiter(s) (50 mL/Hr) IV Continuous <Continuous>  dextrose 50% Injectable 25 Gram(s) IV Push once  dextrose 50% Injectable 12.5 Gram(s) IV Push once  FIRST- Mouthwash  BLM 15 milliLiter(s) Swish and Swallow three times a day  glucagon  Injectable 1 milliGRAM(s) IntraMuscular once  insulin lispro (ADMELOG) corrective regimen sliding scale   SubCutaneous three times a day before meals  insulin lispro (ADMELOG) corrective regimen sliding scale   SubCutaneous at bedtime  losartan 25 milliGRAM(s) Oral daily  magnesium oxide 400 milliGRAM(s) Oral daily  magnesium oxide 400 milliGRAM(s) Oral every 4 hours  multivitamin 1 Tablet(s) Oral daily  naloxone Injectable 0.4 milliGRAM(s) IV Push once  polyethylene glycol 3350 17 Gram(s) Oral daily  senna 2 Tablet(s) Oral at bedtime    MEDICATIONS  (PRN):  acetaminophen   Oral Liquid .. 650 milliGRAM(s) Oral every 6 hours PRN Temp greater or equal to 38C (100.4F), Mild Pain (1 - 3)  bisacodyl 5 milliGRAM(s) Oral daily PRN Constipation  dextrose Oral Gel 15 Gram(s) Oral once PRN Blood Glucose LESS THAN 70 milliGRAM(s)/deciliter  OLANZapine 5 milliGRAM(s) Oral daily PRN for nausea  ondansetron    Tablet 8 milliGRAM(s) Oral every 8 hours PRN for nausea  oxyCODONE    IR 7.5 milliGRAM(s) Oral every 4 hours PRN Moderate Pain (4 - 6)  oxyCODONE    IR 10 milliGRAM(s) Oral every 4 hours PRN Severe Pain (7 - 10)      CAPILLARY BLOOD GLUCOSE      POCT Blood Glucose.: 135 mg/dL (03 Jul 2024 08:08)  POCT Blood Glucose.: 145 mg/dL (02 Jul 2024 21:19)  POCT Blood Glucose.: 146 mg/dL (02 Jul 2024 16:58)  POCT Blood Glucose.: 134 mg/dL (02 Jul 2024 11:47)    I&O's Summary    02 Jul 2024 07:01  -  03 Jul 2024 07:00  --------------------------------------------------------  IN: 1350 mL / OUT: 700 mL / NET: 650 mL        PHYSICAL EXAM:  Vital Signs Last 24 Hrs  T(C): 36.7 (03 Jul 2024 05:58), Max: 36.9 (03 Jul 2024 01:26)  T(F): 98 (03 Jul 2024 05:58), Max: 98.4 (03 Jul 2024 01:26)  HR: 88 (03 Jul 2024 05:58) (88 - 111)  BP: 128/81 (03 Jul 2024 05:58) (102/74 - 133/83)  BP(mean): --  RR: 18 (03 Jul 2024 05:58) (18 - 18)  SpO2: 100% (03 Jul 2024 05:58) (96% - 100%)    Parameters below as of 03 Jul 2024 05:58  Patient On (Oxygen Delivery Method): room air    GENERAL: NAD, well-developed, well-nourished  HEAD: NGT  EYES: EOMI, conjunctiva and sclera clear  CHEST/LUNG: Clear to auscultation bilaterally;  HEART: Regular rate and rhythm; No murmurs, rubs, or gallops, (+)S1, S2  ABDOMEN: Soft, Nontender, Nondistended; Normal Bowel sounds   EXTREMITIES:  2+ Peripheral Pulses, No clubbing, cyanosis, or edema  NEUROLOGY: AAOx3, CN 2-12 grossly intact, 5/5 strength in all extremities      LABS:                        7.3    1.36  )-----------( 17       ( 03 Jul 2024 08:13 )             22.0     07-03    138  |  99  |  18  ----------------------------<  113<H>  4.0   |  27  |  0.66    Ca    9.0      03 Jul 2024 06:30  Phos  3.0     07-03  Mg     1.30     07-03    TPro  8.4<H>  /  Alb  3.6  /  TBili  0.3  /  DBili  x   /  AST  10  /  ALT  10  /  AlkPhos  88  07-01          Urinalysis Basic - ( 03 Jul 2024 06:30 )    Color: x / Appearance: x / SG: x / pH: x  Gluc: 113 mg/dL / Ketone: x  / Bili: x / Urobili: x   Blood: x / Protein: x / Nitrite: x   Leuk Esterase: x / RBC: x / WBC x   Sq Epi: x / Non Sq Epi: x / Bacteria: x          RADIOLOGY & ADDITIONAL TESTS:  Results Reviewed:   Imaging Personally Reviewed:  Electrocardiogram Personally Reviewed:    COORDINATION OF CARE:  Care Discussed with Consultants/Other Providers [Y/N]:  Prior or Outpatient Records Reviewed [Y/N]:   [facility] Division of Hospital Medicine  Rosemary Rodriguez MD  Pager (T-F, 2K-2A): [phone]  Other Times:  [phone]    Patient is a 58y old  Male who presents with a chief complaint of Dehyrdation (03 Jul 2024 06:58)      SUBJECTIVE / OVERNIGHT EVENTS: No acute events ON; tolerating feeds     MEDICATIONS  (STANDING):  apixaban 5 milliGRAM(s) Oral every 12 hours  atorvastatin 40 milliGRAM(s) Oral at bedtime  dextrose 10% Bolus 125 milliLiter(s) IV Bolus once  dextrose 5%. 1000 milliLiter(s) (100 mL/Hr) IV Continuous <Continuous>  dextrose 5%. 1000 milliLiter(s) (50 mL/Hr) IV Continuous <Continuous>  dextrose 50% Injectable 25 Gram(s) IV Push once  dextrose 50% Injectable 12.5 Gram(s) IV Push once  FIRST- Mouthwash  BLM 15 milliLiter(s) Swish and Swallow three times a day  glucagon  Injectable 1 milliGRAM(s) IntraMuscular once  insulin lispro (ADMELOG) corrective regimen sliding scale   SubCutaneous three times a day before meals  insulin lispro (ADMELOG) corrective regimen sliding scale   SubCutaneous at bedtime  losartan 25 milliGRAM(s) Oral daily  magnesium oxide 400 milliGRAM(s) Oral daily  magnesium oxide 400 milliGRAM(s) Oral every 4 hours  multivitamin 1 Tablet(s) Oral daily  naloxone Injectable 0.4 milliGRAM(s) IV Push once  polyethylene glycol 3350 17 Gram(s) Oral daily  senna 2 Tablet(s) Oral at bedtime    MEDICATIONS  (PRN):  acetaminophen   Oral Liquid .. 650 milliGRAM(s) Oral every 6 hours PRN Temp greater or equal to 38C (100.4F), Mild Pain (1 - 3)  bisacodyl 5 milliGRAM(s) Oral daily PRN Constipation  dextrose Oral Gel 15 Gram(s) Oral once PRN Blood Glucose LESS THAN 70 milliGRAM(s)/deciliter  OLANZapine 5 milliGRAM(s) Oral daily PRN for nausea  ondansetron    Tablet 8 milliGRAM(s) Oral every 8 hours PRN for nausea  oxyCODONE    IR 7.5 milliGRAM(s) Oral every 4 hours PRN Moderate Pain (4 - 6)  oxyCODONE    IR 10 milliGRAM(s) Oral every 4 hours PRN Severe Pain (7 - 10)      CAPILLARY BLOOD GLUCOSE      POCT Blood Glucose.: 135 mg/dL (03 Jul 2024 08:08)  POCT Blood Glucose.: 145 mg/dL (02 Jul 2024 21:19)  POCT Blood Glucose.: 146 mg/dL (02 Jul 2024 16:58)  POCT Blood Glucose.: 134 mg/dL (02 Jul 2024 11:47)    I&O's Summary    02 Jul 2024 07:01  -  03 Jul 2024 07:00  --------------------------------------------------------  IN: 1350 mL / OUT: 700 mL / NET: 650 mL        PHYSICAL EXAM:  Vital Signs Last 24 Hrs  T(C): 36.7 (03 Jul 2024 05:58), Max: 36.9 (03 Jul 2024 01:26)  T(F): 98 (03 Jul 2024 05:58), Max: 98.4 (03 Jul 2024 01:26)  HR: 88 (03 Jul 2024 05:58) (88 - 111)  BP: 128/81 (03 Jul 2024 05:58) (102/74 - 133/83)  BP(mean): --  RR: 18 (03 Jul 2024 05:58) (18 - 18)  SpO2: 100% (03 Jul 2024 05:58) (96% - 100%)    Parameters below as of 03 Jul 2024 05:58  Patient On (Oxygen Delivery Method): room air    GENERAL: NAD, well-developed, well-nourished  HEAD: NGT  EYES: EOMI, conjunctiva and sclera clear  CHEST/LUNG: Clear to auscultation bilaterally;  HEART: Regular rate and rhythm; No murmurs, rubs, or gallops, (+)S1, S2  ABDOMEN: Soft, Nontender, Nondistended; Normal Bowel sounds   EXTREMITIES:  2+ Peripheral Pulses, No clubbing, cyanosis, or edema  NEUROLOGY: AAOx3, CN 2-12 grossly intact, 5/5 strength in all extremities      LABS:                        7.3    1.36  )-----------( 17       ( 03 Jul 2024 08:13 )             22.0     07-03    138  |  99  |  18  ----------------------------<  113<H>  4.0   |  27  |  0.66    Ca    9.0      03 Jul 2024 06:30  Phos  3.0     07-03  Mg     1.30     07-03    TPro  8.4<H>  /  Alb  3.6  /  TBili  0.3  /  DBili  x   /  AST  10  /  ALT  10  /  AlkPhos  88  07-01          Urinalysis Basic - ( 03 Jul 2024 06:30 )    Color: x / Appearance: x / SG: x / pH: x  Gluc: 113 mg/dL / Ketone: x  / Bili: x / Urobili: x   Blood: x / Protein: x / Nitrite: x   Leuk Esterase: x / RBC: x / WBC x   Sq Epi: x / Non Sq Epi: x / Bacteria: x          RADIOLOGY & ADDITIONAL TESTS:  Results Reviewed:   Imaging Personally Reviewed:  Electrocardiogram Personally Reviewed:    COORDINATION OF CARE:  Care Discussed with Consultants/Other Providers [Y/N]:  Prior or Outpatient Records Reviewed [Y/N]:

## 2024-07-04 DIAGNOSIS — D61.818 OTHER PANCYTOPENIA: ICD-10-CM

## 2024-07-04 LAB
ADD ON TEST-SPECIMEN IN LAB: SIGNIFICANT CHANGE UP
ANION GAP SERPL CALC-SCNC: 12 MMOL/L — SIGNIFICANT CHANGE UP (ref 7–14)
BASOPHILS # BLD AUTO: 0 K/UL — SIGNIFICANT CHANGE UP (ref 0–0.2)
BASOPHILS NFR BLD AUTO: 0 % — SIGNIFICANT CHANGE UP (ref 0–2)
BUN SERPL-MCNC: 17 MG/DL — SIGNIFICANT CHANGE UP (ref 7–23)
CALCIUM SERPL-MCNC: 9 MG/DL — SIGNIFICANT CHANGE UP (ref 8.4–10.5)
CHLORIDE SERPL-SCNC: 100 MMOL/L — SIGNIFICANT CHANGE UP (ref 98–107)
CO2 SERPL-SCNC: 27 MMOL/L — SIGNIFICANT CHANGE UP (ref 22–31)
CREAT SERPL-MCNC: 0.6 MG/DL — SIGNIFICANT CHANGE UP (ref 0.5–1.3)
EGFR: 113 ML/MIN/1.73M2 — SIGNIFICANT CHANGE UP
EOSINOPHIL # BLD AUTO: 0.01 K/UL — SIGNIFICANT CHANGE UP (ref 0–0.5)
EOSINOPHIL NFR BLD AUTO: 1 % — SIGNIFICANT CHANGE UP (ref 0–6)
GLUCOSE BLDC GLUCOMTR-MCNC: 111 MG/DL — HIGH (ref 70–99)
GLUCOSE BLDC GLUCOMTR-MCNC: 120 MG/DL — HIGH (ref 70–99)
GLUCOSE BLDC GLUCOMTR-MCNC: 138 MG/DL — HIGH (ref 70–99)
GLUCOSE BLDC GLUCOMTR-MCNC: 157 MG/DL — HIGH (ref 70–99)
GLUCOSE SERPL-MCNC: 109 MG/DL — HIGH (ref 70–99)
HCT VFR BLD CALC: 22.3 % — LOW (ref 39–50)
HGB BLD-MCNC: 7.7 G/DL — LOW (ref 13–17)
IANC: 0.56 K/UL — LOW (ref 1.8–7.4)
LYMPHOCYTES # BLD AUTO: 0.14 K/UL — LOW (ref 1–3.3)
LYMPHOCYTES # BLD AUTO: 15 % — SIGNIFICANT CHANGE UP (ref 13–44)
MAGNESIUM SERPL-MCNC: 1.4 MG/DL — LOW (ref 1.6–2.6)
MANUAL SMEAR VERIFICATION: SIGNIFICANT CHANGE UP
MCHC RBC-ENTMCNC: 32.4 PG — SIGNIFICANT CHANGE UP (ref 27–34)
MCHC RBC-ENTMCNC: 34.5 GM/DL — SIGNIFICANT CHANGE UP (ref 32–36)
MCV RBC AUTO: 93.7 FL — SIGNIFICANT CHANGE UP (ref 80–100)
MONOCYTES # BLD AUTO: 0.13 K/UL — SIGNIFICANT CHANGE UP (ref 0–0.9)
MONOCYTES NFR BLD AUTO: 14 % — SIGNIFICANT CHANGE UP (ref 2–14)
NEUTROPHILS # BLD AUTO: 0.66 K/UL — LOW (ref 1.8–7.4)
NEUTROPHILS NFR BLD AUTO: 68 % — SIGNIFICANT CHANGE UP (ref 43–77)
NEUTS BAND # BLD: 2 % — SIGNIFICANT CHANGE UP (ref 0–6)
NRBC # BLD: 0 /100 WBCS — SIGNIFICANT CHANGE UP (ref 0–0)
NRBC # BLD: 0 /100 WBCS — SIGNIFICANT CHANGE UP (ref 0–0)
NRBC # FLD: 0 K/UL — SIGNIFICANT CHANGE UP (ref 0–0)
PHOSPHATE SERPL-MCNC: 3.3 MG/DL — SIGNIFICANT CHANGE UP (ref 2.5–4.5)
PLAT MORPH BLD: ABNORMAL
PLATELET # BLD AUTO: 22 K/UL — LOW (ref 150–400)
PLATELET COUNT - ESTIMATE: ABNORMAL
POTASSIUM SERPL-MCNC: 3.8 MMOL/L — SIGNIFICANT CHANGE UP (ref 3.5–5.3)
POTASSIUM SERPL-SCNC: 3.8 MMOL/L — SIGNIFICANT CHANGE UP (ref 3.5–5.3)
RBC # BLD: 2.38 M/UL — LOW (ref 4.2–5.8)
RBC # FLD: 14.7 % — HIGH (ref 10.3–14.5)
RBC BLD AUTO: SIGNIFICANT CHANGE UP
SODIUM SERPL-SCNC: 139 MMOL/L — SIGNIFICANT CHANGE UP (ref 135–145)
WBC # BLD: 0.94 K/UL — CRITICAL LOW (ref 3.8–10.5)
WBC # FLD AUTO: 0.94 K/UL — CRITICAL LOW (ref 3.8–10.5)

## 2024-07-04 PROCEDURE — 99233 SBSQ HOSP IP/OBS HIGH 50: CPT

## 2024-07-04 RX ORDER — MAGNESIUM SULFATE 100 %
2 POWDER (GRAM) MISCELLANEOUS
Refills: 0 | Status: COMPLETED | OUTPATIENT
Start: 2024-07-04 | End: 2024-07-04

## 2024-07-04 RX ORDER — SODIUM CHLORIDE 0.9 % (FLUSH) 0.9 %
4 SYRINGE (ML) INJECTION EVERY 12 HOURS
Refills: 0 | Status: DISCONTINUED | OUTPATIENT
Start: 2024-07-04 | End: 2024-07-10

## 2024-07-04 RX ADMIN — LOSARTAN POTASSIUM 25 MILLIGRAM(S): 100 TABLET, FILM COATED ORAL at 05:32

## 2024-07-04 RX ADMIN — Medication 1 TABLET(S): at 12:02

## 2024-07-04 RX ADMIN — DIPHENHYDRAMINE HYDROCHLORIDE AND LIDOCAINE HYDROCHLORIDE AND ALUMINUM HYDROXIDE AND MAGNESIUM HYDRO 15 MILLILITER(S): KIT at 15:59

## 2024-07-04 RX ADMIN — OXYCODONE HYDROCHLORIDE 10 MILLIGRAM(S): 100 SOLUTION ORAL at 01:48

## 2024-07-04 RX ADMIN — OXYCODONE HYDROCHLORIDE 10 MILLIGRAM(S): 100 SOLUTION ORAL at 12:08

## 2024-07-04 RX ADMIN — OXYCODONE HYDROCHLORIDE 10 MILLIGRAM(S): 100 SOLUTION ORAL at 12:38

## 2024-07-04 RX ADMIN — ATORVASTATIN CALCIUM 40 MILLIGRAM(S): 20 TABLET, FILM COATED ORAL at 21:52

## 2024-07-04 RX ADMIN — Medication 25 GRAM(S): at 15:58

## 2024-07-04 RX ADMIN — DIPHENHYDRAMINE HYDROCHLORIDE AND LIDOCAINE HYDROCHLORIDE AND ALUMINUM HYDROXIDE AND MAGNESIUM HYDRO 15 MILLILITER(S): KIT at 05:31

## 2024-07-04 RX ADMIN — OXYCODONE HYDROCHLORIDE 10 MILLIGRAM(S): 100 SOLUTION ORAL at 21:15

## 2024-07-04 RX ADMIN — OXYCODONE HYDROCHLORIDE 10 MILLIGRAM(S): 100 SOLUTION ORAL at 20:33

## 2024-07-04 RX ADMIN — Medication 25 GRAM(S): at 12:01

## 2024-07-04 RX ADMIN — OXYCODONE HYDROCHLORIDE 10 MILLIGRAM(S): 100 SOLUTION ORAL at 02:18

## 2024-07-04 RX ADMIN — APIXABAN 5 MILLIGRAM(S): 5 TABLET, FILM COATED ORAL at 17:57

## 2024-07-04 RX ADMIN — APIXABAN 5 MILLIGRAM(S): 5 TABLET, FILM COATED ORAL at 05:32

## 2024-07-04 RX ADMIN — DIPHENHYDRAMINE HYDROCHLORIDE AND LIDOCAINE HYDROCHLORIDE AND ALUMINUM HYDROXIDE AND MAGNESIUM HYDRO 15 MILLILITER(S): KIT at 21:53

## 2024-07-04 NOTE — PROGRESS NOTE ADULT - SUBJECTIVE AND OBJECTIVE BOX
[facility] Division of Hospital Medicine  Rosemary Rodriguez MD  Pager (H-F, 5D-0A): [phone]  Other Times:  [phone]    Patient is a 58y old  Male who presents with a chief complaint of Dehyrdation (04 Jul 2024 09:23)      SUBJECTIVE / OVERNIGHT EVENTS: Pt in NAD no acute events ON; tolerating tube feeds       MEDICATIONS  (STANDING):  apixaban 5 milliGRAM(s) Oral every 12 hours  atorvastatin 40 milliGRAM(s) Oral at bedtime  dextrose 10% Bolus 125 milliLiter(s) IV Bolus once  dextrose 5%. 1000 milliLiter(s) (100 mL/Hr) IV Continuous <Continuous>  dextrose 5%. 1000 milliLiter(s) (50 mL/Hr) IV Continuous <Continuous>  dextrose 50% Injectable 25 Gram(s) IV Push once  dextrose 50% Injectable 12.5 Gram(s) IV Push once  FIRST- Mouthwash  BLM 15 milliLiter(s) Swish and Swallow three times a day  glucagon  Injectable 1 milliGRAM(s) IntraMuscular once  insulin lispro (ADMELOG) corrective regimen sliding scale   SubCutaneous three times a day before meals  insulin lispro (ADMELOG) corrective regimen sliding scale   SubCutaneous at bedtime  losartan 25 milliGRAM(s) Oral daily  multivitamin 1 Tablet(s) Oral daily  naloxone Injectable 0.4 milliGRAM(s) IV Push once  polyethylene glycol 3350 17 Gram(s) Oral daily  senna 2 Tablet(s) Oral at bedtime    MEDICATIONS  (PRN):  acetaminophen   Oral Liquid .. 650 milliGRAM(s) Oral every 6 hours PRN Temp greater or equal to 38C (100.4F), Mild Pain (1 - 3)  bisacodyl 5 milliGRAM(s) Oral daily PRN Constipation  dextrose Oral Gel 15 Gram(s) Oral once PRN Blood Glucose LESS THAN 70 milliGRAM(s)/deciliter  OLANZapine 5 milliGRAM(s) Oral daily PRN for nausea  ondansetron    Tablet 8 milliGRAM(s) Oral every 8 hours PRN for nausea  oxyCODONE    IR 7.5 milliGRAM(s) Oral every 4 hours PRN Moderate Pain (4 - 6)  oxyCODONE    IR 10 milliGRAM(s) Oral every 4 hours PRN Severe Pain (7 - 10)      CAPILLARY BLOOD GLUCOSE      POCT Blood Glucose.: 120 mg/dL (04 Jul 2024 07:56)  POCT Blood Glucose.: 124 mg/dL (03 Jul 2024 22:09)  POCT Blood Glucose.: 136 mg/dL (03 Jul 2024 17:18)  POCT Blood Glucose.: 153 mg/dL (03 Jul 2024 12:17)    I&O's Summary    03 Jul 2024 07:01  -  04 Jul 2024 07:00  --------------------------------------------------------  IN: 1200 mL / OUT: 750 mL / NET: 450 mL        PHYSICAL EXAM:  Vital Signs Last 24 Hrs  T(C): 36.4 (04 Jul 2024 04:15), Max: 37.2 (03 Jul 2024 20:14)  T(F): 97.5 (04 Jul 2024 04:15), Max: 99 (03 Jul 2024 20:14)  HR: 95 (04 Jul 2024 04:15) (89 - 95)  BP: 122/72 (04 Jul 2024 04:15) (101/70 - 122/72)  BP(mean): --  RR: 18 (04 Jul 2024 04:15) (18 - 18)  SpO2: 99% (04 Jul 2024 04:15) (96% - 99%)    Parameters below as of 04 Jul 2024 04:15  Patient On (Oxygen Delivery Method): room air    GENERAL: NAD  HEAD: NGT  EYES: EOMI, conjunctiva and sclera clear  CHEST/LUNG: Clear to auscultation bilaterally;  HEART: Regular rate and rhythm; No murmurs, rubs, or gallops, (+)S1, S2  ABDOMEN: Soft, Nontender, Nondistended; Normal Bowel sounds   EXTREMITIES:  2+ Peripheral Pulses, No clubbing, cyanosis, or edema  NEUROLOGY: AAOx3, CN 2-12 grossly intact, 5/5 strength in all extremities      LABS:                        7.7    0.94  )-----------( 22       ( 04 Jul 2024 05:27 )             22.3     07-04    139  |  100  |  17  ----------------------------<  109<H>  3.8   |  27  |  0.60    Ca    9.0      04 Jul 2024 05:27  Phos  3.3     07-04  Mg     1.40     07-04            Urinalysis Basic - ( 04 Jul 2024 05:27 )    Color: x / Appearance: x / SG: x / pH: x  Gluc: 109 mg/dL / Ketone: x  / Bili: x / Urobili: x   Blood: x / Protein: x / Nitrite: x   Leuk Esterase: x / RBC: x / WBC x   Sq Epi: x / Non Sq Epi: x / Bacteria: x          RADIOLOGY & ADDITIONAL TESTS:  Results Reviewed:   Imaging Personally Reviewed:  Electrocardiogram Personally Reviewed:    COORDINATION OF CARE:  Care Discussed with Consultants/Other Providers [Y/N]:  Prior or Outpatient Records Reviewed [Y/N]:

## 2024-07-04 NOTE — PROGRESS NOTE ADULT - SUBJECTIVE AND OBJECTIVE BOX
Pt seen & examined at bedside. Pt still reporting significant odynophagia but tolerated some ensure shakes yesterday. No F/C, N/V, CP/SOB. Tolerating tube feeds through NGT. Pt reporting thick mucous that he is unable to clear.    PHYSICAL EXAM:    CONSTITUTIONAL: well nourished, well developed, and in no acute distress.    EYES: pupils equal and round and no abnormalities of the conjunctivae and lids.   RESPIRATORY: respirations unlabored, no increased work of breathing with use of accessory muscles and retractions. NO STRIDOR.    CARDIAC: warm extremities, no cyanosis.  ABDOMEN: nondistended.  THORAX: no gross deformities, no pectus defects.   NEUROLOGIC: cranial nerves II - VII and IX - XII with no gross deficits.   MUSCULOSKELETAL: normal muscle STRENGTH, symmetry and tone of facial, head and neck musculature, no clubbing.   INTEGUMENTARY: no obvious skin rash, no skin lesions.  LYMPHATIC: no cervical lymphadenopathy.   PSYCHIATRIC: age APPROPRIATE behavior.   HEAD: normocephalic, atraumatic.    RIGHT EAR: The right pinna was normal.    LEFT EAR: The left pinna was normal.    NOSE: External Nose: normal  Anterior Nasal Cavity: the right nasal cavity was normal and the left nasal cavity was normal. NGT is taped in place with tube feeds running.    NECK: normal with no obvious cervical lesions       LABS:                        7.7    0.94  )-----------( 22 ( 04 Jul 2024 05:27 )             22.3     07-04    139  |  100  |  17  ----------------------------<  109<H>  3.8   |  27  |  0.60    Ca    9.0      04 Jul 2024 05:27  Phos  3.3     07-04  Mg     1.40     07-04

## 2024-07-05 LAB
ADD ON TEST-SPECIMEN IN LAB: SIGNIFICANT CHANGE UP
ADD ON TEST-SPECIMEN IN LAB: SIGNIFICANT CHANGE UP
ANION GAP SERPL CALC-SCNC: 8 MMOL/L — SIGNIFICANT CHANGE UP (ref 7–14)
BASOPHILS # BLD AUTO: 0 K/UL — SIGNIFICANT CHANGE UP (ref 0–0.2)
BASOPHILS NFR BLD AUTO: 0 % — SIGNIFICANT CHANGE UP (ref 0–2)
BUN SERPL-MCNC: 20 MG/DL — SIGNIFICANT CHANGE UP (ref 7–23)
CALCIUM SERPL-MCNC: 8.8 MG/DL — SIGNIFICANT CHANGE UP (ref 8.4–10.5)
CHLORIDE SERPL-SCNC: 101 MMOL/L — SIGNIFICANT CHANGE UP (ref 98–107)
CLOSURE TME COLL+EPINEP BLD: 15 K/UL — CRITICAL LOW (ref 150–400)
CMV DNA CSF QL NAA+PROBE: SIGNIFICANT CHANGE UP IU/ML
CMV DNA SPEC NAA+PROBE-LOG#: SIGNIFICANT CHANGE UP LOG10IU/ML
CO2 SERPL-SCNC: 28 MMOL/L — SIGNIFICANT CHANGE UP (ref 22–31)
CREAT SERPL-MCNC: 0.59 MG/DL — SIGNIFICANT CHANGE UP (ref 0.5–1.3)
EBV DNA SERPL NAA+PROBE-ACNC: SIGNIFICANT CHANGE UP IU/ML
EBVPCR LOG: SIGNIFICANT CHANGE UP LOG10IU/ML
EGFR: 113 ML/MIN/1.73M2 — SIGNIFICANT CHANGE UP
EOSINOPHIL # BLD AUTO: 0.01 K/UL — SIGNIFICANT CHANGE UP (ref 0–0.5)
EOSINOPHIL NFR BLD AUTO: 0.6 % — SIGNIFICANT CHANGE UP (ref 0–6)
GLUCOSE BLDC GLUCOMTR-MCNC: 120 MG/DL — HIGH (ref 70–99)
GLUCOSE BLDC GLUCOMTR-MCNC: 120 MG/DL — HIGH (ref 70–99)
GLUCOSE BLDC GLUCOMTR-MCNC: 128 MG/DL — HIGH (ref 70–99)
GLUCOSE BLDC GLUCOMTR-MCNC: 133 MG/DL — HIGH (ref 70–99)
GLUCOSE SERPL-MCNC: 118 MG/DL — HIGH (ref 70–99)
HAPTOGLOB SERPL-MCNC: 248 MG/DL — HIGH (ref 34–200)
HAPTOGLOB SERPL-MCNC: 248 MG/DL — HIGH (ref 34–200)
HAV IGM SER-ACNC: SIGNIFICANT CHANGE UP
HBV CORE IGM SER-ACNC: SIGNIFICANT CHANGE UP
HBV SURFACE AG SER-ACNC: SIGNIFICANT CHANGE UP
HCT VFR BLD CALC: 22 % — LOW (ref 39–50)
HCV AB S/CO SERPL IA: 0.18 S/CO — SIGNIFICANT CHANGE UP (ref 0–0.99)
HCV AB SERPL-IMP: SIGNIFICANT CHANGE UP
HGB BLD-MCNC: 7.7 G/DL — LOW (ref 13–17)
HIV 1+2 AB+HIV1 P24 AG SERPL QL IA: SIGNIFICANT CHANGE UP
IANC: 1.22 K/UL — LOW (ref 1.8–7.4)
IMM GRANULOCYTES NFR BLD AUTO: 0.6 % — SIGNIFICANT CHANGE UP (ref 0–0.9)
LDH SERPL L TO P-CCNC: 100 U/L — LOW (ref 135–225)
LYMPHOCYTES # BLD AUTO: 0.21 K/UL — LOW (ref 1–3.3)
LYMPHOCYTES # BLD AUTO: 12.5 % — LOW (ref 13–44)
MAGNESIUM SERPL-MCNC: 1.6 MG/DL — SIGNIFICANT CHANGE UP (ref 1.6–2.6)
MCHC RBC-ENTMCNC: 32.6 PG — SIGNIFICANT CHANGE UP (ref 27–34)
MCHC RBC-ENTMCNC: 35 GM/DL — SIGNIFICANT CHANGE UP (ref 32–36)
MCV RBC AUTO: 93.2 FL — SIGNIFICANT CHANGE UP (ref 80–100)
MONOCYTES # BLD AUTO: 0.23 K/UL — SIGNIFICANT CHANGE UP (ref 0–0.9)
MONOCYTES NFR BLD AUTO: 13.7 % — SIGNIFICANT CHANGE UP (ref 2–14)
NEUTROPHILS # BLD AUTO: 1.22 K/UL — LOW (ref 1.8–7.4)
NEUTROPHILS NFR BLD AUTO: 72.6 % — SIGNIFICANT CHANGE UP (ref 43–77)
NRBC # BLD: 0 /100 WBCS — SIGNIFICANT CHANGE UP (ref 0–0)
NRBC # FLD: 0 K/UL — SIGNIFICANT CHANGE UP (ref 0–0)
PHOSPHATE SERPL-MCNC: 3.3 MG/DL — SIGNIFICANT CHANGE UP (ref 2.5–4.5)
PLATELET # BLD AUTO: 18 K/UL — CRITICAL LOW (ref 150–400)
POTASSIUM SERPL-MCNC: 4.1 MMOL/L — SIGNIFICANT CHANGE UP (ref 3.5–5.3)
POTASSIUM SERPL-SCNC: 4.1 MMOL/L — SIGNIFICANT CHANGE UP (ref 3.5–5.3)
RBC # BLD: 2.36 M/UL — LOW (ref 4.2–5.8)
RBC # FLD: 14.8 % — HIGH (ref 10.3–14.5)
SODIUM SERPL-SCNC: 137 MMOL/L — SIGNIFICANT CHANGE UP (ref 135–145)
T3 SERPL-MCNC: 145 NG/DL — SIGNIFICANT CHANGE UP (ref 80–200)
WBC # BLD: 1.68 K/UL — LOW (ref 3.8–10.5)
WBC # FLD AUTO: 1.68 K/UL — LOW (ref 3.8–10.5)

## 2024-07-05 PROCEDURE — 76700 US EXAM ABDOM COMPLETE: CPT | Mod: 26

## 2024-07-05 PROCEDURE — 99233 SBSQ HOSP IP/OBS HIGH 50: CPT

## 2024-07-05 RX ORDER — ACETAMINOPHEN 325 MG
650 TABLET ORAL EVERY 6 HOURS
Refills: 0 | Status: DISCONTINUED | OUTPATIENT
Start: 2024-07-05 | End: 2024-07-10

## 2024-07-05 RX ADMIN — Medication 650 MILLIGRAM(S): at 18:06

## 2024-07-05 RX ADMIN — DIPHENHYDRAMINE HYDROCHLORIDE AND LIDOCAINE HYDROCHLORIDE AND ALUMINUM HYDROXIDE AND MAGNESIUM HYDRO 15 MILLILITER(S): KIT at 13:04

## 2024-07-05 RX ADMIN — Medication 650 MILLIGRAM(S): at 13:33

## 2024-07-05 RX ADMIN — OXYCODONE HYDROCHLORIDE 10 MILLIGRAM(S): 100 SOLUTION ORAL at 19:47

## 2024-07-05 RX ADMIN — LOSARTAN POTASSIUM 25 MILLIGRAM(S): 100 TABLET, FILM COATED ORAL at 05:17

## 2024-07-05 RX ADMIN — APIXABAN 5 MILLIGRAM(S): 5 TABLET, FILM COATED ORAL at 05:17

## 2024-07-05 RX ADMIN — Medication 650 MILLIGRAM(S): at 17:36

## 2024-07-05 RX ADMIN — OXYCODONE HYDROCHLORIDE 10 MILLIGRAM(S): 100 SOLUTION ORAL at 20:47

## 2024-07-05 RX ADMIN — OXYCODONE HYDROCHLORIDE 10 MILLIGRAM(S): 100 SOLUTION ORAL at 05:35

## 2024-07-05 RX ADMIN — Medication 4 MILLILITER(S): at 13:56

## 2024-07-05 RX ADMIN — ATORVASTATIN CALCIUM 40 MILLIGRAM(S): 20 TABLET, FILM COATED ORAL at 22:21

## 2024-07-05 RX ADMIN — Medication 2 TABLET(S): at 22:21

## 2024-07-05 RX ADMIN — Medication 4 MILLILITER(S): at 22:53

## 2024-07-05 RX ADMIN — Medication 1 TABLET(S): at 13:03

## 2024-07-05 RX ADMIN — Medication 650 MILLIGRAM(S): at 13:03

## 2024-07-05 RX ADMIN — APIXABAN 5 MILLIGRAM(S): 5 TABLET, FILM COATED ORAL at 17:38

## 2024-07-05 RX ADMIN — OXYCODONE HYDROCHLORIDE 10 MILLIGRAM(S): 100 SOLUTION ORAL at 04:52

## 2024-07-05 RX ADMIN — DIPHENHYDRAMINE HYDROCHLORIDE AND LIDOCAINE HYDROCHLORIDE AND ALUMINUM HYDROXIDE AND MAGNESIUM HYDRO 15 MILLILITER(S): KIT at 22:21

## 2024-07-05 RX ADMIN — DIPHENHYDRAMINE HYDROCHLORIDE AND LIDOCAINE HYDROCHLORIDE AND ALUMINUM HYDROXIDE AND MAGNESIUM HYDRO 15 MILLILITER(S): KIT at 05:18

## 2024-07-05 NOTE — CONSULT NOTE ADULT - SUBJECTIVE AND OBJECTIVE BOX
58 year old M with h/o hypercholesterolemia, BPH and DM metastatic nasopharyngeal carcinoma, EBV +ve, (dx January 2024),  mets to jugular nodes and palatine tonsil . Pt received one cycle of induction chemotherapy with gemcitabine and cisplatin which was discontinued for significant hematologic toxicity, thrombocytopenia to 22 requiring PLT transfusion as well as neutropenia with a WBC of 0.8. Pt is s/p 7 cycles of weekly cisplatin , last received on 6/18     Admitted under ENT service for dehydration secondary to post-treatment mucositis on 7/1.     Oncology was consulted for pancytopenia.              HPI:   57 y/o M  pmhx DMII (on metformin last took x 3 weeks ago)  and DVT (11/2023 - on Eliquis last took x 3 weeks ago), s/p Left neck Level II FNA 1/5/24 showed metastatic nasopharyngeal carcinoma, MRI at  on 1/30/24 showed metastatic adenopathy involving jugular chains bilaterally with bulky L palatine tonsil. completed CXRT 6/24/2024  sent to the ED from ENT clinic d/t syncopal episode at 2pm (7/1/2024). ENT consulted. Patient seen and examined. Patient explained decreased PO intake x 2 weeks - was taking 4-5 ensures with each meal but yesterday only had 1/2 of one ensure. Patient c/o pain to neck and throat. Patient explained syncopal episode, was at clinic and got up to use bathroom, was accompined by brother- patient brother caught him - no LOC no head hit (per patient) then patient sent to ED    Onc hx:  58 year old M with h/o hypercholesterolemia, BPH and DM metastatic nasopharyngeal carcinoma, EBV +ve, (dx January 2024), mets to jugular nodes and palatine tonsil . s/p just one cycle of induction chemotherapy with gemcitabine and cisplatin which was discontinued for significant hematologic toxicity, thrombocytopenia to 22 requiring PLT transfusion as well as neutropenia with a WBC of 0.8. Pt received one dose of neulasta. Pt was getting CRT with weekly cisplatin (s/p 7 cycles of weekly cisplatin , last received on 6/18 ).       PAST MEDICAL & SURGICAL HISTORY:  Type 2 diabetes mellitus  DVT, lower extremity          Allergies    No Known Allergies    Intolerances        MEDICATIONS  (STANDING):  acetaminophen   Oral Liquid .. 650 milliGRAM(s) Oral every 6 hours  apixaban 5 milliGRAM(s) Oral every 12 hours  atorvastatin 40 milliGRAM(s) Oral at bedtime  dextrose 10% Bolus 125 milliLiter(s) IV Bolus once  dextrose 5%. 1000 milliLiter(s) (100 mL/Hr) IV Continuous <Continuous>  dextrose 5%. 1000 milliLiter(s) (50 mL/Hr) IV Continuous <Continuous>  dextrose 50% Injectable 25 Gram(s) IV Push once  dextrose 50% Injectable 12.5 Gram(s) IV Push once  FIRST- Mouthwash  BLM 15 milliLiter(s) Swish and Swallow three times a day  glucagon  Injectable 1 milliGRAM(s) IntraMuscular once  insulin lispro (ADMELOG) corrective regimen sliding scale   SubCutaneous at bedtime  insulin lispro (ADMELOG) corrective regimen sliding scale   SubCutaneous three times a day before meals  losartan 25 milliGRAM(s) Oral daily  multivitamin 1 Tablet(s) Oral daily  naloxone Injectable 0.4 milliGRAM(s) IV Push once  polyethylene glycol 3350 17 Gram(s) Oral daily  senna 2 Tablet(s) Oral at bedtime  sodium chloride 3%  Inhalation 4 milliLiter(s) Inhalation every 12 hours    MEDICATIONS  (PRN):  bisacodyl 5 milliGRAM(s) Oral daily PRN Constipation  dextrose Oral Gel 15 Gram(s) Oral once PRN Blood Glucose LESS THAN 70 milliGRAM(s)/deciliter  OLANZapine 5 milliGRAM(s) Oral daily PRN for nausea  ondansetron    Tablet 8 milliGRAM(s) Oral every 8 hours PRN for nausea  oxyCODONE    IR 7.5 milliGRAM(s) Oral every 4 hours PRN Moderate Pain (4 - 6)  oxyCODONE    IR 10 milliGRAM(s) Oral every 4 hours PRN Severe Pain (7 - 10)      FAMILY HISTORY:      SOCIAL HISTORY: No EtOH, no tobacco    REVIEW OF SYSTEMS:    CONSTITUTIONAL: No weakness, fevers or chills  EYES/ENT: No visual changes;  No vertigo or throat pain   RESPIRATORY: No cough, wheezing, hemoptysis; No shortness of breath  CARDIOVASCULAR: No chest pain or palpitations  GASTROINTESTINAL: c/o dysphagia and odynophagia  GENITOURINARY: No dysuria, frequency or hematuria  NEUROLOGICAL: No numbness or weakness          T(F): 98.4 (07-05-24 @ 16:18), Max: 98.5 (07-05-24 @ 04:50)  HR: 92 (07-05-24 @ 16:18)  BP: 124/80 (07-05-24 @ 16:18)  RR: 18 (07-05-24 @ 16:18)  SpO2: 100% (07-05-24 @ 16:18)  Wt(kg): --    GENERAL: NAD, well-developed  HEAD:  Atraumatic, Normocephalic  EYES: EOMI, PERRLA, conjunctiva and sclera clear  CHEST/LUNG: Clear to auscultation bilaterally; No wheeze  HEART: Regular rate and rhythm; No murmurs, rubs, or gallops  ABDOMEN: Has NG tube in place. erythematous lesion in upper palate. no oral thrush noted. No tenderness and distension  NEUROLOGY: non-focal  SKIN: No rashes or lesions                          7.7    1.68  )-----------( 18       ( 05 Jul 2024 06:06 )             22.0       07-05    137  |  101  |  20  ----------------------------<  118<H>  4.1   |  28  |  0.59    Ca    8.8      05 Jul 2024 06:06  Phos  3.3     07-05  Mg     1.60     07-05        Lactate Dehydrogenase, Serum: 100 U/L (07-05 @ 06:06)  Magnesium: 1.60 mg/dL (07-05 @ 06:06)  Phosphorus: 3.3 mg/dL (07-05 @ 06:06)

## 2024-07-05 NOTE — CONSULT NOTE ADULT - PROBLEM SELECTOR RECOMMENDATION 9
# Pancytopenia, likely iso recent chemotherapy (got cisplatin on 6/18)  - trend daily cbc with Diff  - CBC today shows, WBC 1.68 and ANC 1.22 (uptrended from last 2 days), Hgb (stable around 7.7), Platelet 17-20s in last few days, pt had Plt count of 24k on the day of last cisplatin on 6/18/24  - anemia studies : B12 and folate normal; TSH low; - normal FT4; LDH low; haptoglobin not indicative of hemolysis  - nonreactive HIV, US abdomen -> no hepatosplenomegaly  - obtain hepatitis panel, EBV, CMV  - transfuse to keep hgb> 7  - Platelet transfuse if count < 10k, if fever and count <20k, bleeding and count < 50k  - no indication of GCSF at this time  - no signs of sepsis at this time    ---incomplete--- # Pancytopenia, likely iso recent chemotherapy (got cisplatin on 6/18)  - trend daily cbc with Diff  - CBC today shows, WBC 1.68 and ANC 1.22 (uptrended from last 2 days), Hgb (stable around 7.7), Platelet 17-20s in last few days, pt had Plt count of 24k on the day of last cisplatin on 6/18/24  - anemia studies : B12 and folate normal; TSH low; - normal FT4; LDH low; haptoglobin not indicative of hemolysis  - nonreactive HIV, US abdomen -> no hepatosplenomegaly  - ordered EBV PCR, will f/u   - transfuse to keep hgb> 7  - Platelet transfuse if count < 10k, if fever and count <20k, bleeding and count < 50k  - no indication of GCSF at this time  - no signs of sepsis at this time  - no contraindication to get discharged from hospital if patient's counts continues to improve and pt is stabilized otherwise from primary team's endpoint

## 2024-07-05 NOTE — CONSULT NOTE ADULT - ASSESSMENT
58 year old M with h/o hypercholesterolemia, BPH and DM metastatic nasopharyngeal carcinoma, EBV +ve, (dx January 2024),  mets to jugular nodes and palatine tonsil . Pt received one cycle of induction chemotherapy with gemcitabine and cisplatin which was discontinued for significant hematologic toxicity, now s/p 7 cycles of cisplatin (last administered 6/18). Patient admitted for dehydration and mucositis. Oncology is consulted for pancytopenia and with the concern if it is secondary to recent chemotherapy administration.  58 year old M with h/o hypercholesterolemia, BPH and DM metastatic nasopharyngeal carcinoma, EBV +ve, (dx January 2024),  mets to jugular nodes and palatine tonsil . Pt received one cycle of induction chemotherapy with gemcitabine and cisplatin which was discontinued for significant hematologic toxicity. Received neulasta x 1 and started CRT with weekly Cisplatin on 5/6/24. Now s/p 7 cycles of cisplatin (last administered 6/18). Patient admitted for dehydration and mucositis. Oncology is consulted for pancytopenia and with the concern if it is secondary to recent chemotherapy administration.

## 2024-07-05 NOTE — CONSULT NOTE ADULT - ATTENDING COMMENTS
Pt seen and examined. 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 here with mucositis and pancytopenia, sec to recent chemotherapy (got cisplatin on 6/18)  Counts have stabilized and mucosal pain seems improved. This is part of typical post-treatment process. Supportive care with close monitoring of counts and CMP. Check EBV DNA PCR which has prognostic value. A/w above assessment and plan.   Pt can be discharged after medical optimization.

## 2024-07-05 NOTE — PROGRESS NOTE ADULT - SUBJECTIVE AND OBJECTIVE BOX
[facility] Division of Hospital Medicine  Rosemary Rodriguez MD  Pager (-F, 7C-0E): [phone]  Other Times:  [phone]    Patient is a 58y old  Male who presents with a chief complaint of Dehyrdation (05 Jul 2024 07:40)      SUBJECTIVE / OVERNIGHT EVENTS: Pt takes in some liquids PO but having difficult time with solids     MEDICATIONS  (STANDING):  acetaminophen   Oral Liquid .. 650 milliGRAM(s) Oral every 6 hours  apixaban 5 milliGRAM(s) Oral every 12 hours  atorvastatin 40 milliGRAM(s) Oral at bedtime  dextrose 10% Bolus 125 milliLiter(s) IV Bolus once  dextrose 5%. 1000 milliLiter(s) (100 mL/Hr) IV Continuous <Continuous>  dextrose 5%. 1000 milliLiter(s) (50 mL/Hr) IV Continuous <Continuous>  dextrose 50% Injectable 25 Gram(s) IV Push once  dextrose 50% Injectable 12.5 Gram(s) IV Push once  FIRST- Mouthwash  BLM 15 milliLiter(s) Swish and Swallow three times a day  glucagon  Injectable 1 milliGRAM(s) IntraMuscular once  insulin lispro (ADMELOG) corrective regimen sliding scale   SubCutaneous at bedtime  insulin lispro (ADMELOG) corrective regimen sliding scale   SubCutaneous three times a day before meals  losartan 25 milliGRAM(s) Oral daily  multivitamin 1 Tablet(s) Oral daily  naloxone Injectable 0.4 milliGRAM(s) IV Push once  polyethylene glycol 3350 17 Gram(s) Oral daily  senna 2 Tablet(s) Oral at bedtime  sodium chloride 3%  Inhalation 4 milliLiter(s) Inhalation every 12 hours    MEDICATIONS  (PRN):  bisacodyl 5 milliGRAM(s) Oral daily PRN Constipation  dextrose Oral Gel 15 Gram(s) Oral once PRN Blood Glucose LESS THAN 70 milliGRAM(s)/deciliter  OLANZapine 5 milliGRAM(s) Oral daily PRN for nausea  ondansetron    Tablet 8 milliGRAM(s) Oral every 8 hours PRN for nausea  oxyCODONE    IR 10 milliGRAM(s) Oral every 4 hours PRN Severe Pain (7 - 10)  oxyCODONE    IR 7.5 milliGRAM(s) Oral every 4 hours PRN Moderate Pain (4 - 6)      CAPILLARY BLOOD GLUCOSE      POCT Blood Glucose.: 133 mg/dL (05 Jul 2024 07:58)  POCT Blood Glucose.: 157 mg/dL (04 Jul 2024 22:14)  POCT Blood Glucose.: 111 mg/dL (04 Jul 2024 17:13)  POCT Blood Glucose.: 138 mg/dL (04 Jul 2024 12:02)    I&O's Summary    04 Jul 2024 07:01  -  05 Jul 2024 07:00  --------------------------------------------------------  IN: 3010 mL / OUT: 300 mL / NET: 2710 mL    05 Jul 2024 07:01  -  05 Jul 2024 09:44  --------------------------------------------------------  IN: 90 mL / OUT: 0 mL / NET: 90 mL        PHYSICAL EXAM:  Vital Signs Last 24 Hrs  T(C): 36.2 (05 Jul 2024 08:35), Max: 36.9 (05 Jul 2024 04:50)  T(F): 97.2 (05 Jul 2024 08:35), Max: 98.5 (05 Jul 2024 04:50)  HR: 102 (05 Jul 2024 08:35) (86 - 129)  BP: 109/67 (05 Jul 2024 08:35) (103/75 - 148/91)  BP(mean): --  RR: 18 (05 Jul 2024 08:35) (18 - 18)  SpO2: 97% (05 Jul 2024 08:35) (96% - 100%)    Parameters below as of 05 Jul 2024 08:35  Patient On (Oxygen Delivery Method): room air      GENERAL: NAD  HEAD: NGT  EYES: EOMI, conjunctiva and sclera clear  CHEST/LUNG: Clear to auscultation bilaterally;  HEART: Regular rate and rhythm; No murmurs, rubs, or gallops, (+)S1, S2  ABDOMEN: Soft, Nontender, Nondistended; Normal Bowel sounds   EXTREMITIES:  2+ Peripheral Pulses, No clubbing, cyanosis, or edema  NEUROLOGY: AAOx3, CN 2-12 grossly intact, 5/5 strength in all extremities    LABS:                        7.7    1.68  )-----------( 18       ( 05 Jul 2024 06:06 )             22.0     07-05    137  |  101  |  20  ----------------------------<  118<H>  4.1   |  28  |  0.59    Ca    8.8      05 Jul 2024 06:06  Phos  3.3     07-05  Mg     1.60     07-05            Urinalysis Basic - ( 05 Jul 2024 06:06 )    Color: x / Appearance: x / SG: x / pH: x  Gluc: 118 mg/dL / Ketone: x  / Bili: x / Urobili: x   Blood: x / Protein: x / Nitrite: x   Leuk Esterase: x / RBC: x / WBC x   Sq Epi: x / Non Sq Epi: x / Bacteria: x          RADIOLOGY & ADDITIONAL TESTS:  Results Reviewed:   Imaging Personally Reviewed:  Electrocardiogram Personally Reviewed:    COORDINATION OF CARE:  Care Discussed with Consultants/Other Providers [Y/N]:  Prior or Outpatient Records Reviewed [Y/N]:

## 2024-07-05 NOTE — PROGRESS NOTE ADULT - SUBJECTIVE AND OBJECTIVE BOX
Pt seen & examined at bedside. Pt continues reporting significant odynophagia but tolerating some ensure shakes yesterday. He does not enjoy the hospital food which is not helping with his intake. No F/C, N/V, CP/SOB. Tolerating tube feeds through NGT.    PHYSICAL EXAM:    CONSTITUTIONAL: well nourished, well developed, and in no acute distress.    EYES: pupils equal and round and no abnormalities of the conjunctivae and lids.   RESPIRATORY: respirations unlabored, no increased work of breathing with use of accessory muscles and retractions. NO STRIDOR.    CARDIAC: warm extremities, no cyanosis.  ABDOMEN: nondistended.  THORAX: no gross deformities, no pectus defects.   NEUROLOGIC: cranial nerves II - VII and IX - XII with no gross deficits.   MUSCULOSKELETAL: normal muscle STRENGTH, symmetry and tone of facial, head and neck musculature, no clubbing.   INTEGUMENTARY: no obvious skin rash, no skin lesions.  LYMPHATIC: no cervical lymphadenopathy.   PSYCHIATRIC: age APPROPRIATE behavior.   HEAD: normocephalic, atraumatic.    RIGHT EAR: The right pinna was normal.    LEFT EAR: The left pinna was normal.    NOSE: External Nose: normal  Anterior Nasal Cavity: the right nasal cavity was normal and the left nasal cavity was normal. NGT is taped in place with tube feeds running.    NECK: normal with no obvious cervical lesions

## 2024-07-06 DIAGNOSIS — R94.6 ABNORMAL RESULTS OF THYROID FUNCTION STUDIES: ICD-10-CM

## 2024-07-06 LAB
ADD ON TEST-SPECIMEN IN LAB: SIGNIFICANT CHANGE UP
ANION GAP SERPL CALC-SCNC: 12 MMOL/L — SIGNIFICANT CHANGE UP (ref 7–14)
BASOPHILS # BLD AUTO: 0 K/UL — SIGNIFICANT CHANGE UP (ref 0–0.2)
BASOPHILS NFR BLD AUTO: 0 % — SIGNIFICANT CHANGE UP (ref 0–2)
BUN SERPL-MCNC: 22 MG/DL — SIGNIFICANT CHANGE UP (ref 7–23)
CALCIUM SERPL-MCNC: 8.8 MG/DL — SIGNIFICANT CHANGE UP (ref 8.4–10.5)
CHLORIDE SERPL-SCNC: 101 MMOL/L — SIGNIFICANT CHANGE UP (ref 98–107)
CO2 SERPL-SCNC: 28 MMOL/L — SIGNIFICANT CHANGE UP (ref 22–31)
CREAT SERPL-MCNC: 0.61 MG/DL — SIGNIFICANT CHANGE UP (ref 0.5–1.3)
EGFR: 112 ML/MIN/1.73M2 — SIGNIFICANT CHANGE UP
EOSINOPHIL # BLD AUTO: 0.02 K/UL — SIGNIFICANT CHANGE UP (ref 0–0.5)
EOSINOPHIL NFR BLD AUTO: 1.7 % — SIGNIFICANT CHANGE UP (ref 0–6)
GLUCOSE BLDC GLUCOMTR-MCNC: 116 MG/DL — HIGH (ref 70–99)
GLUCOSE BLDC GLUCOMTR-MCNC: 116 MG/DL — HIGH (ref 70–99)
GLUCOSE BLDC GLUCOMTR-MCNC: 126 MG/DL — HIGH (ref 70–99)
GLUCOSE BLDC GLUCOMTR-MCNC: 178 MG/DL — HIGH (ref 70–99)
GLUCOSE SERPL-MCNC: 104 MG/DL — HIGH (ref 70–99)
HCT VFR BLD CALC: 21.1 % — LOW (ref 39–50)
HGB BLD-MCNC: 7.3 G/DL — LOW (ref 13–17)
IANC: 0.82 K/UL — LOW (ref 1.8–7.4)
IMM GRANULOCYTES NFR BLD AUTO: 0 % — SIGNIFICANT CHANGE UP (ref 0–0.9)
LYMPHOCYTES # BLD AUTO: 0.18 K/UL — LOW (ref 1–3.3)
LYMPHOCYTES # BLD AUTO: 15.4 % — SIGNIFICANT CHANGE UP (ref 13–44)
MAGNESIUM SERPL-MCNC: 1.5 MG/DL — LOW (ref 1.6–2.6)
MCHC RBC-ENTMCNC: 32.6 PG — SIGNIFICANT CHANGE UP (ref 27–34)
MCHC RBC-ENTMCNC: 34.6 GM/DL — SIGNIFICANT CHANGE UP (ref 32–36)
MCV RBC AUTO: 94.2 FL — SIGNIFICANT CHANGE UP (ref 80–100)
MONOCYTES # BLD AUTO: 0.15 K/UL — SIGNIFICANT CHANGE UP (ref 0–0.9)
MONOCYTES NFR BLD AUTO: 12.8 % — SIGNIFICANT CHANGE UP (ref 2–14)
NEUTROPHILS # BLD AUTO: 0.82 K/UL — LOW (ref 1.8–7.4)
NEUTROPHILS NFR BLD AUTO: 70.1 % — SIGNIFICANT CHANGE UP (ref 43–77)
NRBC # BLD: 0 /100 WBCS — SIGNIFICANT CHANGE UP (ref 0–0)
NRBC # FLD: 0 K/UL — SIGNIFICANT CHANGE UP (ref 0–0)
PHOSPHATE SERPL-MCNC: 4 MG/DL — SIGNIFICANT CHANGE UP (ref 2.5–4.5)
PLATELET # BLD AUTO: 18 K/UL — CRITICAL LOW (ref 150–400)
POTASSIUM SERPL-MCNC: 4.1 MMOL/L — SIGNIFICANT CHANGE UP (ref 3.5–5.3)
POTASSIUM SERPL-SCNC: 4.1 MMOL/L — SIGNIFICANT CHANGE UP (ref 3.5–5.3)
RBC # BLD: 2.24 M/UL — LOW (ref 4.2–5.8)
RBC # FLD: 14.8 % — HIGH (ref 10.3–14.5)
SODIUM SERPL-SCNC: 141 MMOL/L — SIGNIFICANT CHANGE UP (ref 135–145)
T3FREE SERPL-MCNC: 3.01 PG/ML — SIGNIFICANT CHANGE UP (ref 2–4.4)
WBC # BLD: 1.17 K/UL — LOW (ref 3.8–10.5)
WBC # FLD AUTO: 1.17 K/UL — LOW (ref 3.8–10.5)

## 2024-07-06 PROCEDURE — 99232 SBSQ HOSP IP/OBS MODERATE 35: CPT

## 2024-07-06 RX ORDER — MAGNESIUM SULFATE 100 %
2 POWDER (GRAM) MISCELLANEOUS
Refills: 0 | Status: COMPLETED | OUTPATIENT
Start: 2024-07-06 | End: 2024-07-06

## 2024-07-06 RX ADMIN — OXYCODONE HYDROCHLORIDE 10 MILLIGRAM(S): 100 SOLUTION ORAL at 22:52

## 2024-07-06 RX ADMIN — Medication 650 MILLIGRAM(S): at 19:10

## 2024-07-06 RX ADMIN — OXYCODONE HYDROCHLORIDE 10 MILLIGRAM(S): 100 SOLUTION ORAL at 04:21

## 2024-07-06 RX ADMIN — DIPHENHYDRAMINE HYDROCHLORIDE AND LIDOCAINE HYDROCHLORIDE AND ALUMINUM HYDROXIDE AND MAGNESIUM HYDRO 15 MILLILITER(S): KIT at 05:50

## 2024-07-06 RX ADMIN — APIXABAN 5 MILLIGRAM(S): 5 TABLET, FILM COATED ORAL at 19:11

## 2024-07-06 RX ADMIN — Medication 4 MILLILITER(S): at 10:40

## 2024-07-06 RX ADMIN — OXYCODONE HYDROCHLORIDE 10 MILLIGRAM(S): 100 SOLUTION ORAL at 21:52

## 2024-07-06 RX ADMIN — Medication 25 GRAM(S): at 11:40

## 2024-07-06 RX ADMIN — Medication 650 MILLIGRAM(S): at 05:49

## 2024-07-06 RX ADMIN — Medication 650 MILLIGRAM(S): at 01:15

## 2024-07-06 RX ADMIN — OXYCODONE HYDROCHLORIDE 10 MILLIGRAM(S): 100 SOLUTION ORAL at 15:20

## 2024-07-06 RX ADMIN — DIPHENHYDRAMINE HYDROCHLORIDE AND LIDOCAINE HYDROCHLORIDE AND ALUMINUM HYDROXIDE AND MAGNESIUM HYDRO 15 MILLILITER(S): KIT at 21:52

## 2024-07-06 RX ADMIN — OXYCODONE HYDROCHLORIDE 10 MILLIGRAM(S): 100 SOLUTION ORAL at 15:50

## 2024-07-06 RX ADMIN — ATORVASTATIN CALCIUM 40 MILLIGRAM(S): 20 TABLET, FILM COATED ORAL at 21:52

## 2024-07-06 RX ADMIN — Medication 650 MILLIGRAM(S): at 11:47

## 2024-07-06 RX ADMIN — DIPHENHYDRAMINE HYDROCHLORIDE AND LIDOCAINE HYDROCHLORIDE AND ALUMINUM HYDROXIDE AND MAGNESIUM HYDRO 15 MILLILITER(S): KIT at 15:19

## 2024-07-06 RX ADMIN — APIXABAN 5 MILLIGRAM(S): 5 TABLET, FILM COATED ORAL at 05:50

## 2024-07-06 RX ADMIN — LOSARTAN POTASSIUM 25 MILLIGRAM(S): 100 TABLET, FILM COATED ORAL at 05:50

## 2024-07-06 RX ADMIN — Medication 25 GRAM(S): at 08:48

## 2024-07-06 RX ADMIN — OXYCODONE HYDROCHLORIDE 10 MILLIGRAM(S): 100 SOLUTION ORAL at 01:15

## 2024-07-06 RX ADMIN — INSULIN LISPRO 1: 100 INJECTION, SOLUTION SUBCUTANEOUS at 08:44

## 2024-07-06 RX ADMIN — Medication 650 MILLIGRAM(S): at 00:07

## 2024-07-06 RX ADMIN — OXYCODONE HYDROCHLORIDE 10 MILLIGRAM(S): 100 SOLUTION ORAL at 00:12

## 2024-07-06 RX ADMIN — Medication 1 TABLET(S): at 11:46

## 2024-07-06 RX ADMIN — Medication 650 MILLIGRAM(S): at 12:17

## 2024-07-06 RX ADMIN — OXYCODONE HYDROCHLORIDE 10 MILLIGRAM(S): 100 SOLUTION ORAL at 05:49

## 2024-07-06 RX ADMIN — Medication 2 TABLET(S): at 21:52

## 2024-07-06 RX ADMIN — Medication 4 MILLILITER(S): at 21:29

## 2024-07-06 NOTE — PROGRESS NOTE ADULT - SUBJECTIVE AND OBJECTIVE BOX
[facility] Division of Hospital Medicine  Rosemary Rodriguez MD  Pager (U-F, 1J-6I): [phone]  Other Times:  [phone]    Patient is a 58y old  Male who presents with a chief complaint of Dehyrdation (06 Jul 2024 07:45)      SUBJECTIVE / OVERNIGHT EVENTS: no acute events ON: afebrile     MEDICATIONS  (STANDING):  acetaminophen   Oral Liquid .. 650 milliGRAM(s) Oral every 6 hours  apixaban 5 milliGRAM(s) Oral every 12 hours  atorvastatin 40 milliGRAM(s) Oral at bedtime  dextrose 10% Bolus 125 milliLiter(s) IV Bolus once  dextrose 5%. 1000 milliLiter(s) (100 mL/Hr) IV Continuous <Continuous>  dextrose 5%. 1000 milliLiter(s) (50 mL/Hr) IV Continuous <Continuous>  dextrose 50% Injectable 25 Gram(s) IV Push once  dextrose 50% Injectable 12.5 Gram(s) IV Push once  FIRST- Mouthwash  BLM 15 milliLiter(s) Swish and Swallow three times a day  glucagon  Injectable 1 milliGRAM(s) IntraMuscular once  insulin lispro (ADMELOG) corrective regimen sliding scale   SubCutaneous at bedtime  insulin lispro (ADMELOG) corrective regimen sliding scale   SubCutaneous three times a day before meals  losartan 25 milliGRAM(s) Oral daily  magnesium sulfate  IVPB 2 Gram(s) IV Intermittent every 2 hours  multivitamin 1 Tablet(s) Oral daily  naloxone Injectable 0.4 milliGRAM(s) IV Push once  polyethylene glycol 3350 17 Gram(s) Oral daily  senna 2 Tablet(s) Oral at bedtime  sodium chloride 3%  Inhalation 4 milliLiter(s) Inhalation every 12 hours    MEDICATIONS  (PRN):  bisacodyl 5 milliGRAM(s) Oral daily PRN Constipation  dextrose Oral Gel 15 Gram(s) Oral once PRN Blood Glucose LESS THAN 70 milliGRAM(s)/deciliter  OLANZapine 5 milliGRAM(s) Oral daily PRN for nausea  ondansetron    Tablet 8 milliGRAM(s) Oral every 8 hours PRN for nausea  oxyCODONE    IR 7.5 milliGRAM(s) Oral every 4 hours PRN Moderate Pain (4 - 6)  oxyCODONE    IR 10 milliGRAM(s) Oral every 4 hours PRN Severe Pain (7 - 10)      CAPILLARY BLOOD GLUCOSE      POCT Blood Glucose.: 178 mg/dL (06 Jul 2024 08:16)  POCT Blood Glucose.: 120 mg/dL (05 Jul 2024 21:55)  POCT Blood Glucose.: 120 mg/dL (05 Jul 2024 17:08)  POCT Blood Glucose.: 128 mg/dL (05 Jul 2024 12:27)    I&O's Summary    05 Jul 2024 07:01  -  06 Jul 2024 07:00  --------------------------------------------------------  IN: 2080 mL / OUT: 0 mL / NET: 2080 mL        PHYSICAL EXAM:  Vital Signs Last 24 Hrs  T(C): 36.7 (06 Jul 2024 04:22), Max: 37.1 (06 Jul 2024 00:18)  T(F): 98 (06 Jul 2024 04:22), Max: 98.8 (06 Jul 2024 00:18)  HR: 91 (06 Jul 2024 05:45) (90 - 102)  BP: 130/91 (06 Jul 2024 05:45) (105/67 - 133/78)  BP(mean): --  RR: 18 (06 Jul 2024 04:22) (18 - 18)  SpO2: 98% (06 Jul 2024 04:22) (96% - 100%)    Parameters below as of 06 Jul 2024 04:22  Patient On (Oxygen Delivery Method): room air    GENERAL: NAD  HEAD: NGT  EYES: EOMI, conjunctiva and sclera clear  CHEST/LUNG: Clear to auscultation bilaterally;  HEART: Regular rate and rhythm; No murmurs, rubs, or gallops, (+)S1, S2  ABDOMEN: Soft, Nontender, Nondistended; Normal Bowel sounds   EXTREMITIES:  2+ Peripheral Pulses, No clubbing, cyanosis, or edema  NEUROLOGY: AAOx3, CN 2-12 grossly intact, 5/5 strength in all extremities      LABS:                        7.3    1.17  )-----------( 18       ( 06 Jul 2024 04:45 )             21.1     07-06    141  |  101  |  22  ----------------------------<  104<H>  4.1   |  28  |  0.61    Ca    8.8      06 Jul 2024 04:45  Phos  4.0     07-06  Mg     1.50     07-06            Urinalysis Basic - ( 06 Jul 2024 04:45 )    Color: x / Appearance: x / SG: x / pH: x  Gluc: 104 mg/dL / Ketone: x  / Bili: x / Urobili: x   Blood: x / Protein: x / Nitrite: x   Leuk Esterase: x / RBC: x / WBC x   Sq Epi: x / Non Sq Epi: x / Bacteria: x          RADIOLOGY & ADDITIONAL TESTS:  Results Reviewed: < from: US Abdomen Complete (07.05.24 @ 11:55) >  FINDINGS:  Liver: Normal size and echogenicity. No focal lesion is seen.  Bile ducts: Normal caliber. Common bile duct measures 3 mm.  Gallbladder: Within normal limits.  Pancreas: Visualized portions are within normal limits.  Spleen: 12.5 cm. Within normal limits.  Right kidney: 11.1 cm. No hydronephrosis.  Left kidney: 11.1 cm. No hydronephrosis.  Ascites: None.  Aorta and IVC: Visualized portions are within normal limits.    IMPRESSION:  Unremarkable abdominal ultrasound.        --- End of Report ---    < end of copied text >    Imaging Personally Reviewed:  Electrocardiogram Personally Reviewed:    COORDINATION OF CARE:  Care Discussed with Consultants/Other Providers [Y/N]:  Prior or Outpatient Records Reviewed [Y/N]:

## 2024-07-06 NOTE — PROGRESS NOTE ADULT - TIME BILLING
Reviewed lab data, radiology results, consultants' recommendations, documentation in [place], discussed with family, ACP, interdisciplinary staff and/or intervention were performed.
Reviewed lab data, radiology results, consultants' recommendations, documentation in [place], discussed with family, ACP, interdisciplinary staff and/or intervention were performed.

## 2024-07-06 NOTE — PROGRESS NOTE ADULT - SUBJECTIVE AND OBJECTIVE BOX
Pt seen & examined at bedside. Pt continues reporting significant odynophagia but tolerating some ensure shakes yesterday. Pt not able to eat food. No F/C, N/V, CP/SOB. Tolerating tube feeds through NGT.    PHYSICAL EXAM:    CONSTITUTIONAL: well nourished, well developed, and in no acute distress.    EYES: pupils equal and round and no abnormalities of the conjunctivae and lids.   RESPIRATORY: respirations unlabored, no increased work of breathing with use of accessory muscles and retractions. NO STRIDOR.    CARDIAC: warm extremities, no cyanosis.  ABDOMEN: nondistended.  THORAX: no gross deformities, no pectus defects.   NEUROLOGIC: cranial nerves II - VII and IX - XII with no gross deficits.   MUSCULOSKELETAL: normal muscle STRENGTH, symmetry and tone of facial, head and neck musculature, no clubbing.   INTEGUMENTARY: no obvious skin rash, no skin lesions.  LYMPHATIC: no cervical lymphadenopathy.   PSYCHIATRIC: age APPROPRIATE behavior.   HEAD: normocephalic, atraumatic.    RIGHT EAR: The right pinna was normal.    LEFT EAR: The left pinna was normal.    NOSE: External Nose: normal  Anterior Nasal Cavity: the right nasal cavity was normal and the left nasal cavity was normal. NGT is taped in place with tube feeds running.    NECK: normal with no obvious cervical lesions

## 2024-07-07 LAB
ANION GAP SERPL CALC-SCNC: 10 MMOL/L — SIGNIFICANT CHANGE UP (ref 7–14)
BUN SERPL-MCNC: 16 MG/DL — SIGNIFICANT CHANGE UP (ref 7–23)
CALCIUM SERPL-MCNC: 8.9 MG/DL — SIGNIFICANT CHANGE UP (ref 8.4–10.5)
CHLORIDE SERPL-SCNC: 97 MMOL/L — LOW (ref 98–107)
CO2 SERPL-SCNC: 28 MMOL/L — SIGNIFICANT CHANGE UP (ref 22–31)
CREAT SERPL-MCNC: 0.58 MG/DL — SIGNIFICANT CHANGE UP (ref 0.5–1.3)
EGFR: 114 ML/MIN/1.73M2 — SIGNIFICANT CHANGE UP
GLUCOSE BLDC GLUCOMTR-MCNC: 107 MG/DL — HIGH (ref 70–99)
GLUCOSE BLDC GLUCOMTR-MCNC: 110 MG/DL — HIGH (ref 70–99)
GLUCOSE BLDC GLUCOMTR-MCNC: 135 MG/DL — HIGH (ref 70–99)
GLUCOSE BLDC GLUCOMTR-MCNC: 173 MG/DL — HIGH (ref 70–99)
GLUCOSE SERPL-MCNC: 112 MG/DL — HIGH (ref 70–99)
HCT VFR BLD CALC: 21.9 % — LOW (ref 39–50)
HGB BLD-MCNC: 7.4 G/DL — LOW (ref 13–17)
MAGNESIUM SERPL-MCNC: 1.7 MG/DL — SIGNIFICANT CHANGE UP (ref 1.6–2.6)
MCHC RBC-ENTMCNC: 31.6 PG — SIGNIFICANT CHANGE UP (ref 27–34)
MCHC RBC-ENTMCNC: 33.8 GM/DL — SIGNIFICANT CHANGE UP (ref 32–36)
MCV RBC AUTO: 93.6 FL — SIGNIFICANT CHANGE UP (ref 80–100)
NRBC # BLD: 0 /100 WBCS — SIGNIFICANT CHANGE UP (ref 0–0)
NRBC # FLD: 0 K/UL — SIGNIFICANT CHANGE UP (ref 0–0)
PHOSPHATE SERPL-MCNC: 3.4 MG/DL — SIGNIFICANT CHANGE UP (ref 2.5–4.5)
PLATELET # BLD AUTO: 16 K/UL — CRITICAL LOW (ref 150–400)
POTASSIUM SERPL-MCNC: 3.7 MMOL/L — SIGNIFICANT CHANGE UP (ref 3.5–5.3)
POTASSIUM SERPL-SCNC: 3.7 MMOL/L — SIGNIFICANT CHANGE UP (ref 3.5–5.3)
RBC # BLD: 2.34 M/UL — LOW (ref 4.2–5.8)
RBC # FLD: 15.4 % — HIGH (ref 10.3–14.5)
SODIUM SERPL-SCNC: 135 MMOL/L — SIGNIFICANT CHANGE UP (ref 135–145)
WBC # BLD: 1.8 K/UL — LOW (ref 3.8–10.5)
WBC # FLD AUTO: 1.8 K/UL — LOW (ref 3.8–10.5)

## 2024-07-07 PROCEDURE — 99232 SBSQ HOSP IP/OBS MODERATE 35: CPT

## 2024-07-07 RX ORDER — MAGNESIUM SULFATE 100 %
1 POWDER (GRAM) MISCELLANEOUS ONCE
Refills: 0 | Status: COMPLETED | OUTPATIENT
Start: 2024-07-07 | End: 2024-07-07

## 2024-07-07 RX ADMIN — INSULIN LISPRO 1: 100 INJECTION, SOLUTION SUBCUTANEOUS at 08:44

## 2024-07-07 RX ADMIN — OXYCODONE HYDROCHLORIDE 10 MILLIGRAM(S): 100 SOLUTION ORAL at 12:02

## 2024-07-07 RX ADMIN — DIPHENHYDRAMINE HYDROCHLORIDE AND LIDOCAINE HYDROCHLORIDE AND ALUMINUM HYDROXIDE AND MAGNESIUM HYDRO 15 MILLILITER(S): KIT at 06:27

## 2024-07-07 RX ADMIN — OXYCODONE HYDROCHLORIDE 10 MILLIGRAM(S): 100 SOLUTION ORAL at 17:43

## 2024-07-07 RX ADMIN — Medication 650 MILLIGRAM(S): at 17:43

## 2024-07-07 RX ADMIN — Medication 650 MILLIGRAM(S): at 18:40

## 2024-07-07 RX ADMIN — Medication 650 MILLIGRAM(S): at 12:04

## 2024-07-07 RX ADMIN — Medication 650 MILLIGRAM(S): at 07:30

## 2024-07-07 RX ADMIN — OXYCODONE HYDROCHLORIDE 10 MILLIGRAM(S): 100 SOLUTION ORAL at 06:26

## 2024-07-07 RX ADMIN — Medication 650 MILLIGRAM(S): at 13:00

## 2024-07-07 RX ADMIN — DIPHENHYDRAMINE HYDROCHLORIDE AND LIDOCAINE HYDROCHLORIDE AND ALUMINUM HYDROXIDE AND MAGNESIUM HYDRO 15 MILLILITER(S): KIT at 12:05

## 2024-07-07 RX ADMIN — Medication 1 TABLET(S): at 12:05

## 2024-07-07 RX ADMIN — OXYCODONE HYDROCHLORIDE 10 MILLIGRAM(S): 100 SOLUTION ORAL at 07:30

## 2024-07-07 RX ADMIN — Medication 650 MILLIGRAM(S): at 06:26

## 2024-07-07 RX ADMIN — Medication 4 MILLILITER(S): at 08:12

## 2024-07-07 RX ADMIN — Medication 650 MILLIGRAM(S): at 00:25

## 2024-07-07 RX ADMIN — Medication 100 GRAM(S): at 12:03

## 2024-07-07 RX ADMIN — ATORVASTATIN CALCIUM 40 MILLIGRAM(S): 20 TABLET, FILM COATED ORAL at 21:36

## 2024-07-07 RX ADMIN — APIXABAN 5 MILLIGRAM(S): 5 TABLET, FILM COATED ORAL at 06:26

## 2024-07-07 RX ADMIN — APIXABAN 5 MILLIGRAM(S): 5 TABLET, FILM COATED ORAL at 17:44

## 2024-07-07 RX ADMIN — OXYCODONE HYDROCHLORIDE 10 MILLIGRAM(S): 100 SOLUTION ORAL at 18:40

## 2024-07-07 RX ADMIN — Medication 4 MILLILITER(S): at 21:03

## 2024-07-07 RX ADMIN — OXYCODONE HYDROCHLORIDE 10 MILLIGRAM(S): 100 SOLUTION ORAL at 13:00

## 2024-07-07 RX ADMIN — DIPHENHYDRAMINE HYDROCHLORIDE AND LIDOCAINE HYDROCHLORIDE AND ALUMINUM HYDROXIDE AND MAGNESIUM HYDRO 15 MILLILITER(S): KIT at 21:35

## 2024-07-07 RX ADMIN — LOSARTAN POTASSIUM 25 MILLIGRAM(S): 100 TABLET, FILM COATED ORAL at 06:27

## 2024-07-07 RX ADMIN — Medication 2 TABLET(S): at 21:35

## 2024-07-07 NOTE — PROVIDER CONTACT NOTE (CRITICAL VALUE NOTIFICATION) - ASSESSMENT
VSS.
pt is alert and oriented x4. VSS. Denies chest pain or SOB. Resting comfortable in supine position in bed.

## 2024-07-07 NOTE — PROVIDER CONTACT NOTE (CRITICAL VALUE NOTIFICATION) - BACKGROUND
pt admitted for near syncopal episode in bathroom. poor PO intake
pt admitted for syncopal episode/dehydration

## 2024-07-07 NOTE — PROVIDER CONTACT NOTE (CRITICAL VALUE NOTIFICATION) - ACTION/TREATMENT ORDERED:
Team ENT [id number] Felipe Rivera made aware.
As per provider
Mally Velazquez ENT [id number] made aware.
provider made aware. no new orders at the moment.
Felipe Rivera [id number] made aware --  Platelet 18.0

## 2024-07-07 NOTE — PROGRESS NOTE ADULT - SUBJECTIVE AND OBJECTIVE BOX
[facility] Division of Hospital Medicine  Rosemary Rodriguez MD  Pager (Q-F, 2B-1K): [phone]  Other Times:  [phone]    Patient is a 58y old  Male who presents with a chief complaint of Dehyrdation (07 Jul 2024 08:39)      SUBJECTIVE / OVERNIGHT EVENTS: states eating better was able to tolerate glucerna and coffee notes less pain       MEDICATIONS  (STANDING):  acetaminophen   Oral Liquid .. 650 milliGRAM(s) Oral every 6 hours  apixaban 5 milliGRAM(s) Oral every 12 hours  atorvastatin 40 milliGRAM(s) Oral at bedtime  dextrose 10% Bolus 125 milliLiter(s) IV Bolus once  dextrose 5%. 1000 milliLiter(s) (100 mL/Hr) IV Continuous <Continuous>  dextrose 5%. 1000 milliLiter(s) (50 mL/Hr) IV Continuous <Continuous>  dextrose 50% Injectable 25 Gram(s) IV Push once  dextrose 50% Injectable 12.5 Gram(s) IV Push once  FIRST- Mouthwash  BLM 15 milliLiter(s) Swish and Swallow three times a day  glucagon  Injectable 1 milliGRAM(s) IntraMuscular once  insulin lispro (ADMELOG) corrective regimen sliding scale   SubCutaneous three times a day before meals  insulin lispro (ADMELOG) corrective regimen sliding scale   SubCutaneous at bedtime  losartan 25 milliGRAM(s) Oral daily  magnesium sulfate  IVPB 1 Gram(s) IV Intermittent once  multivitamin 1 Tablet(s) Oral daily  naloxone Injectable 0.4 milliGRAM(s) IV Push once  polyethylene glycol 3350 17 Gram(s) Oral daily  senna 2 Tablet(s) Oral at bedtime  sodium chloride 3%  Inhalation 4 milliLiter(s) Inhalation every 12 hours    MEDICATIONS  (PRN):  bisacodyl 5 milliGRAM(s) Oral daily PRN Constipation  dextrose Oral Gel 15 Gram(s) Oral once PRN Blood Glucose LESS THAN 70 milliGRAM(s)/deciliter  OLANZapine 5 milliGRAM(s) Oral daily PRN for nausea  ondansetron    Tablet 8 milliGRAM(s) Oral every 8 hours PRN for nausea  oxyCODONE    IR 7.5 milliGRAM(s) Oral every 4 hours PRN Moderate Pain (4 - 6)  oxyCODONE    IR 10 milliGRAM(s) Oral every 4 hours PRN Severe Pain (7 - 10)      CAPILLARY BLOOD GLUCOSE      POCT Blood Glucose.: 173 mg/dL (07 Jul 2024 07:58)  POCT Blood Glucose.: 116 mg/dL (06 Jul 2024 21:48)  POCT Blood Glucose.: 126 mg/dL (06 Jul 2024 18:02)  POCT Blood Glucose.: 116 mg/dL (06 Jul 2024 12:11)    I&O's Summary    06 Jul 2024 07:01  -  07 Jul 2024 07:00  --------------------------------------------------------  IN: 2690 mL / OUT: 0 mL / NET: 2690 mL        PHYSICAL EXAM:  Vital Signs Last 24 Hrs  T(C): 36.3 (07 Jul 2024 06:23), Max: 37.4 (06 Jul 2024 20:01)  T(F): 97.4 (07 Jul 2024 06:23), Max: 99.3 (06 Jul 2024 20:01)  HR: 89 (07 Jul 2024 08:12) (81 - 97)  BP: 121/80 (07 Jul 2024 06:23) (113/76 - 123/92)  BP(mean): --  RR: 18 (07 Jul 2024 06:23) (17 - 18)  SpO2: 98% (07 Jul 2024 08:12) (97% - 100%)    Parameters below as of 07 Jul 2024 06:23  Patient On (Oxygen Delivery Method): room air    GENERAL: NAD  HEAD: NGT  EYES: EOMI, conjunctiva and sclera clear  CHEST/LUNG: Clear to auscultation bilaterally;  HEART: Regular rate and rhythm; No murmurs, rubs, or gallops, (+)S1, S2  ABDOMEN: Soft, Nontender, Nondistended; Normal Bowel sounds   EXTREMITIES:  2+ Peripheral Pulses, No clubbing, cyanosis, or edema  NEUROLOGY: AAOx3, CN 2-12 grossly intact, 5/5 strength in all extremities        LABS:                        7.4    1.80  )-----------( 16       ( 07 Jul 2024 05:14 )             21.9     07-07    135  |  97<L>  |  16  ----------------------------<  112<H>  3.7   |  28  |  0.58    Ca    8.9      07 Jul 2024 05:14  Phos  3.4     07-07  Mg     1.70     07-07            Urinalysis Basic - ( 07 Jul 2024 05:14 )    Color: x / Appearance: x / SG: x / pH: x  Gluc: 112 mg/dL / Ketone: x  / Bili: x / Urobili: x   Blood: x / Protein: x / Nitrite: x   Leuk Esterase: x / RBC: x / WBC x   Sq Epi: x / Non Sq Epi: x / Bacteria: x          RADIOLOGY & ADDITIONAL TESTS:  Results Reviewed:   Imaging Personally Reviewed:  Electrocardiogram Personally Reviewed:    COORDINATION OF CARE:  Care Discussed with Consultants/Other Providers [Y/N]:  Prior or Outpatient Records Reviewed [Y/N]:

## 2024-07-07 NOTE — PROVIDER CONTACT NOTE (CRITICAL VALUE NOTIFICATION) - NAME OF MD/NP/PA/DO NOTIFIED:
NIALL Stinson
Felipe Rivera ENT [id number]
[facility] [id number] Felipe Rivera
Mally Velazquez ENT [id number]
Mally Velazquez

## 2024-07-08 LAB
ADD ON TEST-SPECIMEN IN LAB: SIGNIFICANT CHANGE UP
BASOPHILS # BLD AUTO: 0 K/UL — SIGNIFICANT CHANGE UP (ref 0–0.2)
BASOPHILS NFR BLD AUTO: 0 % — SIGNIFICANT CHANGE UP (ref 0–2)
EBV DNA SERPL NAA+PROBE-ACNC: SIGNIFICANT CHANGE UP IU/ML
EBVPCR LOG: SIGNIFICANT CHANGE UP LOG10IU/ML
EOSINOPHIL # BLD AUTO: 0.01 K/UL — SIGNIFICANT CHANGE UP (ref 0–0.5)
EOSINOPHIL NFR BLD AUTO: 0.6 % — SIGNIFICANT CHANGE UP (ref 0–6)
GLUCOSE BLDC GLUCOMTR-MCNC: 102 MG/DL — HIGH (ref 70–99)
GLUCOSE BLDC GLUCOMTR-MCNC: 113 MG/DL — HIGH (ref 70–99)
GLUCOSE BLDC GLUCOMTR-MCNC: 128 MG/DL — HIGH (ref 70–99)
GLUCOSE BLDC GLUCOMTR-MCNC: 172 MG/DL — HIGH (ref 70–99)
IANC: 1.34 K/UL — LOW (ref 1.8–7.4)
IMM GRANULOCYTES NFR BLD AUTO: 0 % — SIGNIFICANT CHANGE UP (ref 0–0.9)
LYMPHOCYTES # BLD AUTO: 0.21 K/UL — LOW (ref 1–3.3)
LYMPHOCYTES # BLD AUTO: 11.7 % — LOW (ref 13–44)
MONOCYTES # BLD AUTO: 0.23 K/UL — SIGNIFICANT CHANGE UP (ref 0–0.9)
MONOCYTES NFR BLD AUTO: 12.8 % — SIGNIFICANT CHANGE UP (ref 2–14)
NEUTROPHILS # BLD AUTO: 1.34 K/UL — LOW (ref 1.8–7.4)
NEUTROPHILS NFR BLD AUTO: 74.9 % — SIGNIFICANT CHANGE UP (ref 43–77)

## 2024-07-08 PROCEDURE — 99232 SBSQ HOSP IP/OBS MODERATE 35: CPT

## 2024-07-08 RX ORDER — OXYCODONE HYDROCHLORIDE 100 MG/5ML
7.5 SOLUTION ORAL EVERY 4 HOURS
Refills: 0 | Status: DISCONTINUED | OUTPATIENT
Start: 2024-07-08 | End: 2024-07-10

## 2024-07-08 RX ORDER — OXYCODONE HYDROCHLORIDE 100 MG/5ML
10 SOLUTION ORAL EVERY 4 HOURS
Refills: 0 | Status: DISCONTINUED | OUTPATIENT
Start: 2024-07-08 | End: 2024-07-10

## 2024-07-08 RX ADMIN — POLYETHYLENE GLYCOL 3350 17 GRAM(S): 1 POWDER ORAL at 12:09

## 2024-07-08 RX ADMIN — Medication 650 MILLIGRAM(S): at 06:09

## 2024-07-08 RX ADMIN — APIXABAN 5 MILLIGRAM(S): 5 TABLET, FILM COATED ORAL at 05:08

## 2024-07-08 RX ADMIN — Medication 650 MILLIGRAM(S): at 05:09

## 2024-07-08 RX ADMIN — OXYCODONE HYDROCHLORIDE 10 MILLIGRAM(S): 100 SOLUTION ORAL at 06:09

## 2024-07-08 RX ADMIN — DIPHENHYDRAMINE HYDROCHLORIDE AND LIDOCAINE HYDROCHLORIDE AND ALUMINUM HYDROXIDE AND MAGNESIUM HYDRO 15 MILLILITER(S): KIT at 22:19

## 2024-07-08 RX ADMIN — Medication 650 MILLIGRAM(S): at 23:56

## 2024-07-08 RX ADMIN — DIPHENHYDRAMINE HYDROCHLORIDE AND LIDOCAINE HYDROCHLORIDE AND ALUMINUM HYDROXIDE AND MAGNESIUM HYDRO 15 MILLILITER(S): KIT at 05:09

## 2024-07-08 RX ADMIN — OXYCODONE HYDROCHLORIDE 10 MILLIGRAM(S): 100 SOLUTION ORAL at 20:56

## 2024-07-08 RX ADMIN — OXYCODONE HYDROCHLORIDE 10 MILLIGRAM(S): 100 SOLUTION ORAL at 01:20

## 2024-07-08 RX ADMIN — Medication 650 MILLIGRAM(S): at 13:09

## 2024-07-08 RX ADMIN — ATORVASTATIN CALCIUM 40 MILLIGRAM(S): 20 TABLET, FILM COATED ORAL at 22:19

## 2024-07-08 RX ADMIN — Medication 650 MILLIGRAM(S): at 12:09

## 2024-07-08 RX ADMIN — INSULIN LISPRO 1: 100 INJECTION, SOLUTION SUBCUTANEOUS at 08:02

## 2024-07-08 RX ADMIN — LOSARTAN POTASSIUM 25 MILLIGRAM(S): 100 TABLET, FILM COATED ORAL at 05:09

## 2024-07-08 RX ADMIN — Medication 1 TABLET(S): at 12:10

## 2024-07-08 RX ADMIN — Medication 4 MILLILITER(S): at 10:32

## 2024-07-08 RX ADMIN — Medication 650 MILLIGRAM(S): at 19:26

## 2024-07-08 RX ADMIN — Medication 650 MILLIGRAM(S): at 00:15

## 2024-07-08 RX ADMIN — OXYCODONE HYDROCHLORIDE 10 MILLIGRAM(S): 100 SOLUTION ORAL at 00:14

## 2024-07-08 RX ADMIN — Medication 650 MILLIGRAM(S): at 01:20

## 2024-07-08 RX ADMIN — OXYCODONE HYDROCHLORIDE 10 MILLIGRAM(S): 100 SOLUTION ORAL at 20:26

## 2024-07-08 RX ADMIN — Medication 650 MILLIGRAM(S): at 18:35

## 2024-07-08 RX ADMIN — Medication 4 MILLILITER(S): at 20:45

## 2024-07-08 RX ADMIN — Medication 2 TABLET(S): at 22:20

## 2024-07-08 RX ADMIN — APIXABAN 5 MILLIGRAM(S): 5 TABLET, FILM COATED ORAL at 18:29

## 2024-07-08 RX ADMIN — DIPHENHYDRAMINE HYDROCHLORIDE AND LIDOCAINE HYDROCHLORIDE AND ALUMINUM HYDROXIDE AND MAGNESIUM HYDRO 15 MILLILITER(S): KIT at 14:06

## 2024-07-08 RX ADMIN — OXYCODONE HYDROCHLORIDE 10 MILLIGRAM(S): 100 SOLUTION ORAL at 05:08

## 2024-07-08 NOTE — PROGRESS NOTE ADULT - SUBJECTIVE AND OBJECTIVE BOX
OTOLARYNGOLOGY (ENT) PROGRESS NOTE    PATIENT: JAVIER SIMS  MRN: [id number]  : 67  [id number]  DATE OF SERVICE:  24  	  Subjective/ Interval:   Tolerating slightly improved PO intake.    Physical Exam:  General: well-developed, NAD  OU: EOMI; PERRL; no drainage or redness  AU: external ears normal  Nose: nares patent. NGT in place  Mouth: No oral lesions; no gross abnormalities  Neck: trachea midline  Respiratory: unlabored respirations  Cardiovascular: regular rate  Gastrointestinal: Soft, nondistended  Extremities: No edema, warm and well perfused  Skin: No lesions; no rash       LABS                       7.4    1.80  )-----------( 16       ( 2024 05:14 )             21.9    07-    135  |  97<L>  |  16  ----------------------------<  112<H>  3.7   |  28  |  0.58    Ca    8.9      2024 05:14  Phos  3.4     -  Mg     1.70     -           Coagulation Studies-     Urinalysis Basic - ( 2024 05:14 )    Color: x / Appearance: x / SG: x / pH: x  Gluc: 112 mg/dL / Ketone: x  / Bili: x / Urobili: x   Blood: x / Protein: x / Nitrite: x   Leuk Esterase: x / RBC: x / WBC x   Sq Epi: x / Non Sq Epi: x / Bacteria: x      Endocrine Panel-                MICROBIOLOGY:

## 2024-07-08 NOTE — PROGRESS NOTE ADULT - SUBJECTIVE AND OBJECTIVE BOX
[facility] Division of Hospital Medicine  Kurtis Chavis MD  Pager [phone]    Patient is a 58y old  Male who presents with a chief complaint of Dehyrdation (08 Jul 2024 07:11)      SUBJECTIVE / OVERNIGHT EVENTS: throat hurts but not as bad, eating and drinking      MEDICATIONS  (STANDING):  acetaminophen   Oral Liquid .. 650 milliGRAM(s) Oral every 6 hours  apixaban 5 milliGRAM(s) Oral every 12 hours  atorvastatin 40 milliGRAM(s) Oral at bedtime  dextrose 10% Bolus 125 milliLiter(s) IV Bolus once  dextrose 5%. 1000 milliLiter(s) (100 mL/Hr) IV Continuous <Continuous>  dextrose 5%. 1000 milliLiter(s) (50 mL/Hr) IV Continuous <Continuous>  dextrose 50% Injectable 25 Gram(s) IV Push once  dextrose 50% Injectable 12.5 Gram(s) IV Push once  FIRST- Mouthwash  BLM 15 milliLiter(s) Swish and Swallow three times a day  glucagon  Injectable 1 milliGRAM(s) IntraMuscular once  insulin lispro (ADMELOG) corrective regimen sliding scale   SubCutaneous three times a day before meals  insulin lispro (ADMELOG) corrective regimen sliding scale   SubCutaneous at bedtime  losartan 25 milliGRAM(s) Oral daily  multivitamin 1 Tablet(s) Oral daily  naloxone Injectable 0.4 milliGRAM(s) IV Push once  polyethylene glycol 3350 17 Gram(s) Oral daily  senna 2 Tablet(s) Oral at bedtime  sodium chloride 3%  Inhalation 4 milliLiter(s) Inhalation every 12 hours    MEDICATIONS  (PRN):  bisacodyl 5 milliGRAM(s) Oral daily PRN Constipation  dextrose Oral Gel 15 Gram(s) Oral once PRN Blood Glucose LESS THAN 70 milliGRAM(s)/deciliter  OLANZapine 5 milliGRAM(s) Oral daily PRN for nausea  ondansetron    Tablet 8 milliGRAM(s) Oral every 8 hours PRN for nausea  oxyCODONE    IR 7.5 milliGRAM(s) Oral every 4 hours PRN Moderate Pain (4 - 6)  oxyCODONE    IR 10 milliGRAM(s) Oral every 4 hours PRN Severe Pain (7 - 10)      CAPILLARY BLOOD GLUCOSE      POCT Blood Glucose.: 128 mg/dL (08 Jul 2024 11:47)  POCT Blood Glucose.: 172 mg/dL (08 Jul 2024 07:50)  POCT Blood Glucose.: 135 mg/dL (07 Jul 2024 22:01)  POCT Blood Glucose.: 110 mg/dL (07 Jul 2024 17:11)    I&O's Summary    07 Jul 2024 07:01  -  08 Jul 2024 07:00  --------------------------------------------------------  IN: 3920 mL / OUT: 0 mL / NET: 3920 mL        PHYSICAL EXAM:  Vital Signs Last 24 Hrs  T(C): 36.4 (08 Jul 2024 12:15), Max: 36.9 (08 Jul 2024 08:01)  T(F): 97.6 (08 Jul 2024 12:15), Max: 98.4 (08 Jul 2024 08:01)  HR: 99 (08 Jul 2024 12:15) (81 - 103)  BP: 109/79 (08 Jul 2024 12:15) (105/67 - 119/82)  BP(mean): --  RR: 17 (08 Jul 2024 12:15) (16 - 18)  SpO2: 100% (08 Jul 2024 12:15) (97% - 100%)    Parameters below as of 08 Jul 2024 12:15  Patient On (Oxygen Delivery Method): room air      GENERAL: NAD  HEAD: NGT  EYES: EOMI, conjunctiva and sclera clear  CHEST/LUNG: Clear to auscultation bilaterally;  HEART: Regular rate and rhythm; No murmurs, rubs, or gallops, (+)S1, S2  ABDOMEN: Soft, Nontender, Nondistended; Normal Bowel sounds   EXTREMITIES:  2+ Peripheral Pulses, No clubbing, cyanosis, or edema  NEUROLOGY: AAOx3, CN 2-12 grossly intact, 5/5 strength in all extremities    LABS:                        7.4    1.80  )-----------( 16       ( 07 Jul 2024 05:14 )             21.9     07-07    135  |  97<L>  |  16  ----------------------------<  112<H>  3.7   |  28  |  0.58    Ca    8.9      07 Jul 2024 05:14  Phos  3.4     07-07  Mg     1.70     07-07            Urinalysis Basic - ( 07 Jul 2024 05:14 )    Color: x / Appearance: x / SG: x / pH: x  Gluc: 112 mg/dL / Ketone: x  / Bili: x / Urobili: x   Blood: x / Protein: x / Nitrite: x   Leuk Esterase: x / RBC: x / WBC x   Sq Epi: x / Non Sq Epi: x / Bacteria: x

## 2024-07-09 LAB
ADD ON TEST-SPECIMEN IN LAB: SIGNIFICANT CHANGE UP
ANION GAP SERPL CALC-SCNC: 13 MMOL/L — SIGNIFICANT CHANGE UP (ref 7–14)
BASOPHILS # BLD AUTO: 0.02 K/UL — SIGNIFICANT CHANGE UP (ref 0–0.2)
BASOPHILS NFR BLD AUTO: 1.5 % — SIGNIFICANT CHANGE UP (ref 0–2)
BUN SERPL-MCNC: 15 MG/DL — SIGNIFICANT CHANGE UP (ref 7–23)
CALCIUM SERPL-MCNC: 9.3 MG/DL — SIGNIFICANT CHANGE UP (ref 8.4–10.5)
CHLORIDE SERPL-SCNC: 99 MMOL/L — SIGNIFICANT CHANGE UP (ref 98–107)
CO2 SERPL-SCNC: 23 MMOL/L — SIGNIFICANT CHANGE UP (ref 22–31)
CREAT SERPL-MCNC: 0.55 MG/DL — SIGNIFICANT CHANGE UP (ref 0.5–1.3)
EGFR: 116 ML/MIN/1.73M2 — SIGNIFICANT CHANGE UP
EOSINOPHIL # BLD AUTO: 0.01 K/UL — SIGNIFICANT CHANGE UP (ref 0–0.5)
EOSINOPHIL NFR BLD AUTO: 0.8 % — SIGNIFICANT CHANGE UP (ref 0–6)
GLUCOSE BLDC GLUCOMTR-MCNC: 100 MG/DL — HIGH (ref 70–99)
GLUCOSE BLDC GLUCOMTR-MCNC: 129 MG/DL — HIGH (ref 70–99)
GLUCOSE BLDC GLUCOMTR-MCNC: 133 MG/DL — HIGH (ref 70–99)
GLUCOSE BLDC GLUCOMTR-MCNC: 157 MG/DL — HIGH (ref 70–99)
GLUCOSE SERPL-MCNC: 100 MG/DL — HIGH (ref 70–99)
HCT VFR BLD CALC: 23.2 % — LOW (ref 39–50)
HGB BLD-MCNC: 7.5 G/DL — LOW (ref 13–17)
IANC: 0.82 K/UL — LOW (ref 1.8–7.4)
IMM GRANULOCYTES NFR BLD AUTO: 2.3 % — HIGH (ref 0–0.9)
LYMPHOCYTES # BLD AUTO: 0.2 K/UL — LOW (ref 1–3.3)
LYMPHOCYTES # BLD AUTO: 15.4 % — SIGNIFICANT CHANGE UP (ref 13–44)
MAGNESIUM SERPL-MCNC: 1.5 MG/DL — LOW (ref 1.6–2.6)
MCHC RBC-ENTMCNC: 31.1 PG — SIGNIFICANT CHANGE UP (ref 27–34)
MCHC RBC-ENTMCNC: 32.3 GM/DL — SIGNIFICANT CHANGE UP (ref 32–36)
MCV RBC AUTO: 96.3 FL — SIGNIFICANT CHANGE UP (ref 80–100)
MONOCYTES # BLD AUTO: 0.22 K/UL — SIGNIFICANT CHANGE UP (ref 0–0.9)
MONOCYTES NFR BLD AUTO: 16.9 % — HIGH (ref 2–14)
NEUTROPHILS # BLD AUTO: 0.82 K/UL — LOW (ref 1.8–7.4)
NEUTROPHILS NFR BLD AUTO: 63.1 % — SIGNIFICANT CHANGE UP (ref 43–77)
NRBC # BLD: 0 /100 WBCS — SIGNIFICANT CHANGE UP (ref 0–0)
NRBC # FLD: 0 K/UL — SIGNIFICANT CHANGE UP (ref 0–0)
PHOSPHATE SERPL-MCNC: 3.5 MG/DL — SIGNIFICANT CHANGE UP (ref 2.5–4.5)
PLATELET # BLD AUTO: 21 K/UL — LOW (ref 150–400)
POTASSIUM SERPL-MCNC: 4.4 MMOL/L — SIGNIFICANT CHANGE UP (ref 3.5–5.3)
POTASSIUM SERPL-SCNC: 4.4 MMOL/L — SIGNIFICANT CHANGE UP (ref 3.5–5.3)
RBC # BLD: 2.41 M/UL — LOW (ref 4.2–5.8)
RBC # FLD: 14.7 % — HIGH (ref 10.3–14.5)
SODIUM SERPL-SCNC: 135 MMOL/L — SIGNIFICANT CHANGE UP (ref 135–145)
WBC # BLD: 1.32 K/UL — LOW (ref 3.8–10.5)
WBC # FLD AUTO: 1.32 K/UL — LOW (ref 3.8–10.5)

## 2024-07-09 PROCEDURE — 99232 SBSQ HOSP IP/OBS MODERATE 35: CPT

## 2024-07-09 RX ADMIN — Medication 650 MILLIGRAM(S): at 12:53

## 2024-07-09 RX ADMIN — Medication 650 MILLIGRAM(S): at 19:01

## 2024-07-09 RX ADMIN — DIPHENHYDRAMINE HYDROCHLORIDE AND LIDOCAINE HYDROCHLORIDE AND ALUMINUM HYDROXIDE AND MAGNESIUM HYDRO 15 MILLILITER(S): KIT at 21:09

## 2024-07-09 RX ADMIN — DIPHENHYDRAMINE HYDROCHLORIDE AND LIDOCAINE HYDROCHLORIDE AND ALUMINUM HYDROXIDE AND MAGNESIUM HYDRO 15 MILLILITER(S): KIT at 14:30

## 2024-07-09 RX ADMIN — Medication 650 MILLIGRAM(S): at 18:42

## 2024-07-09 RX ADMIN — Medication 4 MILLILITER(S): at 20:50

## 2024-07-09 RX ADMIN — INSULIN LISPRO 1: 100 INJECTION, SOLUTION SUBCUTANEOUS at 11:50

## 2024-07-09 RX ADMIN — Medication 2 TABLET(S): at 21:09

## 2024-07-09 RX ADMIN — OXYCODONE HYDROCHLORIDE 10 MILLIGRAM(S): 100 SOLUTION ORAL at 22:04

## 2024-07-09 RX ADMIN — APIXABAN 5 MILLIGRAM(S): 5 TABLET, FILM COATED ORAL at 06:53

## 2024-07-09 RX ADMIN — DIPHENHYDRAMINE HYDROCHLORIDE AND LIDOCAINE HYDROCHLORIDE AND ALUMINUM HYDROXIDE AND MAGNESIUM HYDRO 15 MILLILITER(S): KIT at 06:53

## 2024-07-09 RX ADMIN — ATORVASTATIN CALCIUM 40 MILLIGRAM(S): 20 TABLET, FILM COATED ORAL at 21:09

## 2024-07-09 RX ADMIN — APIXABAN 5 MILLIGRAM(S): 5 TABLET, FILM COATED ORAL at 18:42

## 2024-07-09 RX ADMIN — Medication 650 MILLIGRAM(S): at 06:53

## 2024-07-09 RX ADMIN — Medication 4 MILLILITER(S): at 09:00

## 2024-07-09 RX ADMIN — Medication 650 MILLIGRAM(S): at 13:10

## 2024-07-09 RX ADMIN — Medication 650 MILLIGRAM(S): at 00:56

## 2024-07-09 RX ADMIN — Medication 1 TABLET(S): at 12:51

## 2024-07-09 NOTE — PROGRESS NOTE ADULT - PROBLEM SELECTOR PLAN 5
- Continue lisinopril, hold lasix  - monitor BP
- Hold home metformin 1g BID Jardiance 25mg daily   - diabetic diet  - A1c 6.6  - too tightly controlled would monitor off orals
- Eliquis to 5 BID;
- Eliquis to 5 BID;
- Hold home metformin 1g BID Jardiance 25mg daily   - diabetic diet  - A1c 6.6  - too tightly controlled would monitor off orals
- Eliquis to 5 BID;
- hold lasix  - losartan 25  - monitor BP
- Eliquis to 5 BID;

## 2024-07-09 NOTE — PROGRESS NOTE ADULT - PROBLEM SELECTOR PROBLEM 1
Near syncope
Pancytopenia
Near syncope
Pancytopenia
Near syncope

## 2024-07-09 NOTE — PROGRESS NOTE ADULT - PROBLEM SELECTOR PROBLEM 4
Nasopharyngeal cancer
History of DVT in adulthood
Nasopharyngeal cancer
Benign essential HTN
Benign essential HTN
Nasopharyngeal cancer
Nasopharyngeal cancer
History of DVT in adulthood

## 2024-07-09 NOTE — PROGRESS NOTE ADULT - SUBJECTIVE AND OBJECTIVE BOX
OTOLARYNGOLOGY (ENT) PROGRESS NOTE    PATIENT: JAVIER SIMS  MRN: [id number]  : 67  [id number]  DATE OF SERVICE:  24  	  Subjective/ Interval:   Pt in good spirits this AM. States he was able to take PO half of what was given to him. Is comfortable with his level of intake for the NGT to be removed, taken out at bedside this AM.    Physical Exam:  General: well-developed, NAD  OU: EOMI; PERRL; no drainage or redness  AU: external ears normal  Nose: nares patent. NGT in place removed at bedside.  Mouth: No oral lesions; no gross abnormalities  Neck: trachea midline  Respiratory: unlabored respirations  Cardiovascular: regular rate  Gastrointestinal: Soft, nondistended  Extremities: No edema, warm and well perfused  Skin: No lesions; no rash               MICROBIOLOGY:

## 2024-07-09 NOTE — CHART NOTE - NSCHARTNOTEFT_GEN_A_CORE
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     At time of visit, Pt awake, alert. Per Pt, his appetite better but not good; Pt ate ~50% of breakfast this morning. Pt does not like Pureed food/food items reported; Pt wants to eat Regular consistency food/food items reported. Pt drinks PO supplement: Glucerna shake, even though Pt does not like the taste of Glucerna, reported as well. Prior to advance PO diet, suggest to conduct Speech & swallow evaluation -> PO diet/Fluid consistency per SLP rec. Of note, EN support via NGT discontinued. No report of nausea, vomiting or diarrhea @ this time. +Last BM (7/7) per flow sheet.      Pt's height: 69" - per Pt      IBW: 160#+/-10%      Pt's weights: 190# (few weeks ago, per Pt); 83.7 kg/184.5# (7/8/24), 85.5 kg/188.4# (7/1/24)  Pertinent Medications: Lipitor, Multivitamin, 1st mouthwash, Senna, Miralax, Zofran (PRN), Dulcolax (PRN), Insulin (Lispro),   Pertinent Labs: (7/9) WBC 1.32 L, H/H 7.5/23.2 L, PLT 21 L, Glu 100 H, Mg 1.50 H;     (7/1) Albumin 3.6, HbA1c 6.6% H   CAPILLARY BLOOD GLUCOSE  POCT Blood Glucose.: 157 mg/dL (09 Jul 2024 11:49)  POCT Blood Glucose.: 133 mg/dL (09 Jul 2024 08:03)  POCT Blood Glucose.: 113 mg/dL (08 Jul 2024 22:05)  POCT Blood Glucose.: 102 mg/dL (08 Jul 2024 17:08)  Skin: no report of pressure injury at present     Estimated Needs: [x] no change since previous assessment  Previous Nutrition Diagnosis: [x] Malnutrition, moderate   Nutrition Diagnosis is [x] ongoing     Nutrition Interventions/Recommendations:   1. Conduct Speech & swallow evaluation to determine appropriate PO diet/Fluid consistency;  2. Encourage & assist Pt with meals; Monitor PO diet tolerance;   3. Honor food preferences;   4. Continue Multivitamin, as ordered;   5. Continue 1st Mouthwash, as ordered;   6. 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     At time of visit, Pt awake, alert. Per Pt, his appetite better but not good; Pt ate ~50% of breakfast this morning. Pt does not like Pureed food/food items reported; Pt wants to eat Regular consistency food/food items reported. Pt drinks PO supplement: Glucerna shake, even though Pt does not like the taste of Glucerna, reported as well. Kitchen to provide Magic Cup, reduced sugar (290kcal, 9gm Protein) - 2x daily. Prior to advance PO diet, suggest to conduct Speech & swallow evaluation -> PO diet/Fluid consistency per SLP rec. Of note, EN support via NGT discontinued. No report of nausea, vomiting or diarrhea @ this time. +Last BM (7/7) per flow sheet.      Pt's height: 69" - per Pt      IBW: 160#+/-10%      Pt's weights: 190# (few weeks ago, per Pt); 83.7 kg/184.5# (7/8/24), 85.5 kg/188.4# (7/1/24)  Pertinent Medications: Lipitor, Multivitamin, 1st mouthwash, Senna, Miralax, Zofran (PRN), Dulcolax (PRN), Insulin (Lispro),   Pertinent Labs: (7/9) WBC 1.32 L, H/H 7.5/23.2 L, PLT 21 L, Glu 100 H, Mg 1.50 H;     (7/1) Albumin 3.6, HbA1c 6.6% H   CAPILLARY BLOOD GLUCOSE  POCT Blood Glucose.: 157 mg/dL (09 Jul 2024 11:49)  POCT Blood Glucose.: 133 mg/dL (09 Jul 2024 08:03)  POCT Blood Glucose.: 113 mg/dL (08 Jul 2024 22:05)  POCT Blood Glucose.: 102 mg/dL (08 Jul 2024 17:08)  Skin: no report of pressure injury at present     Estimated Needs: [x] no change since previous assessment  Previous Nutrition Diagnosis: [x] Malnutrition, moderate   Nutrition Diagnosis is [x] ongoing     Nutrition Interventions/Recommendations:   1. Conduct Speech & swallow evaluation to determine appropriate PO diet/Fluid consistency;  2. Add Consistent Carbohydrate therapeutic restriction to diet rx;   3. Encourage & assist Pt with meals; Monitor PO diet tolerance;   4. Kitchen to provide Magic Cup, reduced sugar (290kcal, 9gm Protein) - 2x daily;   5. Continue Multivitamin, as ordered;   6. Continue 1st Mouthwash, as ordered;   7. Monitor labs, weekly weights, hydration status;

## 2024-07-09 NOTE — PROGRESS NOTE ADULT - PROBLEM SELECTOR PROBLEM 7
HLD (hyperlipidemia)
T2DM (type 2 diabetes mellitus)
HLD (hyperlipidemia)

## 2024-07-09 NOTE — PROGRESS NOTE ADULT - PROBLEM SELECTOR PROBLEM 3
Nasopharyngeal cancer
Nasopharyngeal cancer
Near syncope
Near syncope
History of DVT in adulthood
History of DVT in adulthood
Near syncope
Near syncope

## 2024-07-09 NOTE — PROGRESS NOTE ADULT - PROBLEM SELECTOR PROBLEM 6
T2DM (type 2 diabetes mellitus)
Benign essential HTN
Benign essential HTN
HLD (hyperlipidemia)
HLD (hyperlipidemia)
Benign essential HTN
T2DM (type 2 diabetes mellitus)
Benign essential HTN

## 2024-07-09 NOTE — PROGRESS NOTE ADULT - PROBLEM SELECTOR PLAN 7
- Hold home metformin 1g BID Jardiance 25mg daily   - diabetic diet  - A1c 6.6  - too tightly controlled would monitor off orals
- Hold home metformin 1g BID Jardiance 25mg daily   - diabetic diet  - A1c 6.6  - too tightly controlled would monitor off orals
- lipitor 40 qhs
- lipitor 40 qhs
- Hold home metformin 1g BID Jardiance 25mg daily   - diabetic diet  - A1c 6.6  - too tightly controlled would monitor off orals
- Hold home metformin 1g BID Jardiance 25mg daily   - diabetic diet  - A1c 6.6  - too tightly controlled would monitor off orals

## 2024-07-09 NOTE — PROGRESS NOTE ADULT - PROBLEM SELECTOR PLAN 2
- RT last 6/24   - management as per primary
Likely 2/2 decreased PO intake in setting of recent radiation   - BUN/Cr ratio >20:1   - magic mouth wash   - NGT placed and Xray confirmed placement - bolus tube feeds as per nutrition  - orthostatic positive  - s/p bolus 500 ml x 1  - repeat orthostatic negative
- RT last 6/24   - as per pt chemo 2 weeks ago;   - anemia studies reviewed AOCD B12 and folate normal; TSH low check FT4   - Heme Onc consulted for pancytopenia
- TSH low; normal FT4;   - intermittent tach on tele   - check FT3 added  - consider endo consult for poss treatement of subclinical hyperthyroid
- TSH low; normal FT4; FT3 WNL  - intermittent tach on tele   - consider endo consult for f/u for subclinical hyperthyroidism
- TSH low; normal FT4; FT3 WNL  - intermittent tach on tele   - consider endo outpt  f/u for subclinical hyperthyroidism
- likely in setting of recent chemo   - anemia studies reviewed AOCD B12 and folate normal; TSH low; normal FT4   - monitor ANC and plts, LDH and hapto   - US abdomen to r/o hepatomegaly  - Platelet transfuse if < 10k, if fever and count <20k, bleeding and count < 50k  - Heme following for poss CSF
- TSH low; normal FT4; FT3 WNL  - intermittent tach on tele   - consider endo consult for poss treatement of subclinical hyperthyroid

## 2024-07-09 NOTE — PROGRESS NOTE ADULT - PROBLEM SELECTOR PLAN 6
- hold lasix  - losartan 25  - monitor BP
- hold lasix  - losartan 25  - monitor BP
- Hold home metformin 1g BID Jardiance 25mg daily   - diabetic diet  - A1c 6.6  - too tightly controlled would monitor off orals
- lipitor 40 qhs
- hold lasix  - losartan 25  - monitor BP
- Hold home metformin 1g BID Jardiance 25mg daily   - diabetic diet  - A1c 6.6  - too tightly controlled would monitor off orals
- hold lasix  - losartan 25  - monitor BP
- lipitor 40 qhs

## 2024-07-09 NOTE — PROGRESS NOTE ADULT - PROBLEM SELECTOR PLAN 3
- RT last 6/24   - as per pt chemo 2 weeks ago; cisplatin
Likely 2/2 decreased PO intake in setting of recent radiation   - BUN/Cr ratio >20:1   - magic mouth wash   - NGT placed and Xray confirmed placement - bolus tube feeds as per nutrition  - orthostatic positive  - s/p bolus 500 ml x 1  - repeat orthostatic negative  - diet as per ENT, advancing
- Eliquis to 5 BID;
Likely 2/2 decreased PO intake in setting of recent radiation   - BUN/Cr ratio >20:1   - magic mouth wash   - repeat orthostatic negative  - diet as per ENT, advancing  - NGT now d/nellie
Likely 2/2 decreased PO intake in setting of recent radiation   - BUN/Cr ratio >20:1   - magic mouth wash   - NGT placed and Xray confirmed placement - bolus tube feeds as per nutrition  - orthostatic positive  - s/p bolus 500 ml x 1  - repeat orthostatic negative
- change Eliquis to 5 BID; no dose adjustment for PE/DVT discussed with pharmacy
Likely 2/2 decreased PO intake in setting of recent radiation   - BUN/Cr ratio >20:1   - magic mouth wash   - NGT placed and Xray confirmed placement - bolus tube feeds as per nutrition  - orthostatic positive  - s/p bolus 500 ml x 1  - repeat orthostatic negative  - diet as per ENT
- RT last 6/24   - as per pt chemo 2 weeks ago; cisplatin

## 2024-07-09 NOTE — PROGRESS NOTE ADULT - PROBLEM SELECTOR PLAN 4
- Eliquis to 5 BID;
- RT last 6/24   - as per pt chemo 2 weeks ago; cisplatin
- Eliquis to 5 BID;
- Continue lisinopril, hold lasix  - monitor BP
- Continue lisinopril, hold lasix  - monitor BP
- RT last 6/24   - as per pt chemo 2 weeks ago; cisplatin

## 2024-07-09 NOTE — PROGRESS NOTE ADULT - PROBLEM SELECTOR PROBLEM 2
Nasopharyngeal cancer
Abnormal thyroid function test
Pancytopenia
Near syncope
Nasopharyngeal cancer
Abnormal thyroid function test

## 2024-07-09 NOTE — PROGRESS NOTE ADULT - PROBLEM SELECTOR PROBLEM 5
T2DM (type 2 diabetes mellitus)
Benign essential HTN
History of DVT in adulthood
Benign essential HTN
T2DM (type 2 diabetes mellitus)
History of DVT in adulthood

## 2024-07-09 NOTE — PROGRESS NOTE ADULT - PROBLEM SELECTOR PLAN 1
- likely in setting of recent chemo   - anemia studies reviewed AOCD B12 and folate normal; LDH low; hapto not indicative of hemolysis  - Platelet transfuse if < 10k, if fever and count <20k, bleeding and count < 50k  - US abdomen neg for hepatosplenomegaly   - EBV/CMV/ hep neg   - no indication for CSF as per heme
- likely in setting of recent chemo   - anemia studies reviewed AOCD B12 and folate normal; LDH low; hapto not indicative of hemolysis  - Platelet transfuse if < 10k, if fever and count <20k, bleeding and count < 50k  - US abdomen neg for hepatosplenomegaly   - EBV/CMV/ hep neg   - no indication for CSF as per heme
Likely 2/2 decreased PO intake in setting of recent radiation   - BUN/Cr ratio >20:1   - magic mouth wash   - NGT placed and Xray confirmed placement - bolus tube feeds as per nutrition  - orthostatic positive  - s/p bolus 500 ml x 1  - repeat orthostatic in AM
- likely in setting of recent chemo   - anemia studies reviewed AOCD B12 and folate normal; LDH low; hapto not indicative of hemolysis  - Platelet transfuse if < 10k, if fever and count <20k, bleeding and count < 50k  - US abdomen neg for hepatosplenomegaly   - EBV/CMV/ hep neg   - no indication for CSF as per heme
Likely 2/2 decreased PO intake in setting of recent radiation   - BUN/Cr ratio >20:1   - magic mouth wash   - NGT placed and Xray confirmed placement - bolus tube feeds as per nutrition  - orthostatic positive  - bolus 500 ml x 1
Likely 2/2 decreased PO intake in setting of recent radiation   - BUN/Cr ratio >20:1   - magic mouth wash   - NGT placed and Xray confirmed placement - tube feeds as per nutrition  - check orthostatic
- likely in setting of recent chemo   - anemia studies reviewed AOCD B12 and folate normal; LDH low; hapto not indicative of hemolysis  - Platelet transfuse if < 10k, if fever and count <20k, bleeding and count < 50k  - US abdomen neg for hepatosplenomegaly   - EBV/CMV/ hep neg   - no indication for CSF as per heme
- likely in setting of recent chemo   - anemia studies reviewed AOCD B12 and folate normal; TSH low; normal FT4; LDH low; hapto not indicative of hemolysis  - Platelet transfuse if < 10k, if fever and count <20k, bleeding and count < 50k  - monitor ANC and plts  - US abdomen   - Heme following

## 2024-07-09 NOTE — PROGRESS NOTE ADULT - SUBJECTIVE AND OBJECTIVE BOX
[facility] Division of Hospital Medicine  Kurtis Chavis MD  Pager [phone]    Patient is a 58y old  Male who presents with a chief complaint of Dehyrdation (09 Jul 2024 06:48)      SUBJECTIVE / OVERNIGHT EVENTS: Eating and drinking much better, NGT out      MEDICATIONS  (STANDING):  acetaminophen   Oral Liquid .. 650 milliGRAM(s) Oral every 6 hours  apixaban 5 milliGRAM(s) Oral every 12 hours  atorvastatin 40 milliGRAM(s) Oral at bedtime  dextrose 10% Bolus 125 milliLiter(s) IV Bolus once  dextrose 5%. 1000 milliLiter(s) (100 mL/Hr) IV Continuous <Continuous>  dextrose 5%. 1000 milliLiter(s) (50 mL/Hr) IV Continuous <Continuous>  dextrose 50% Injectable 25 Gram(s) IV Push once  dextrose 50% Injectable 12.5 Gram(s) IV Push once  FIRST- Mouthwash  BLM 15 milliLiter(s) Swish and Swallow three times a day  glucagon  Injectable 1 milliGRAM(s) IntraMuscular once  insulin lispro (ADMELOG) corrective regimen sliding scale   SubCutaneous at bedtime  insulin lispro (ADMELOG) corrective regimen sliding scale   SubCutaneous three times a day before meals  losartan 25 milliGRAM(s) Oral daily  multivitamin 1 Tablet(s) Oral daily  naloxone Injectable 0.4 milliGRAM(s) IV Push once  polyethylene glycol 3350 17 Gram(s) Oral daily  senna 2 Tablet(s) Oral at bedtime  sodium chloride 3%  Inhalation 4 milliLiter(s) Inhalation every 12 hours    MEDICATIONS  (PRN):  bisacodyl 5 milliGRAM(s) Oral daily PRN Constipation  dextrose Oral Gel 15 Gram(s) Oral once PRN Blood Glucose LESS THAN 70 milliGRAM(s)/deciliter  OLANZapine 5 milliGRAM(s) Oral daily PRN for nausea  ondansetron    Tablet 8 milliGRAM(s) Oral every 8 hours PRN for nausea  oxyCODONE    IR 7.5 milliGRAM(s) Oral every 4 hours PRN Moderate Pain (4 - 6)  oxyCODONE    IR 10 milliGRAM(s) Oral every 4 hours PRN Severe Pain (7 - 10)      CAPILLARY BLOOD GLUCOSE      POCT Blood Glucose.: 157 mg/dL (09 Jul 2024 11:49)  POCT Blood Glucose.: 133 mg/dL (09 Jul 2024 08:03)  POCT Blood Glucose.: 113 mg/dL (08 Jul 2024 22:05)  POCT Blood Glucose.: 102 mg/dL (08 Jul 2024 17:08)    I&O's Summary      PHYSICAL EXAM:  Vital Signs Last 24 Hrs  T(C): 36.6 (09 Jul 2024 12:15), Max: 37.4 (08 Jul 2024 20:16)  T(F): 97.9 (09 Jul 2024 12:15), Max: 99.3 (08 Jul 2024 20:16)  HR: 92 (09 Jul 2024 12:15) (80 - 98)  BP: 107/72 (09 Jul 2024 12:15) (100/57 - 122/75)  BP(mean): --  RR: 18 (09 Jul 2024 12:15) (16 - 18)  SpO2: 100% (09 Jul 2024 12:15) (97% - 100%)    Parameters below as of 09 Jul 2024 12:15  Patient On (Oxygen Delivery Method): room air      CONSTITUTIONAL: NAD  EYES: EOMI, conjunctiva and sclera clear  ENMT: Moist oral mucosa  NECK: Supple  RESPIRATORY: Breathing unlabored, CTAB  CARDIOVASCULAR: S1S2 no MRG  ABDOMEN: Nontender to palpation, normoactive bowel sounds, no rebound/guarding  MUSCULOSKELETAL: no clubbing or cyanosis of digits  NEUROLOGY: No focal deficits   SKIN: No rashes or lesions    LABS:                        7.5    1.32  )-----------( 21       ( 09 Jul 2024 09:03 )             23.2     07-09    135  |  99  |  15  ----------------------------<  100<H>  4.4   |  23  |  0.55    Ca    9.3      09 Jul 2024 06:19  Phos  3.5     07-09  Mg     1.50     07-09            Urinalysis Basic - ( 09 Jul 2024 06:19 )    Color: x / Appearance: x / SG: x / pH: x  Gluc: 100 mg/dL / Ketone: x  / Bili: x / Urobili: x   Blood: x / Protein: x / Nitrite: x   Leuk Esterase: x / RBC: x / WBC x   Sq Epi: x / Non Sq Epi: x / Bacteria: x

## 2024-07-10 ENCOUNTER — TRANSCRIPTION ENCOUNTER (OUTPATIENT)
Age: 59
End: 2024-07-10

## 2024-07-10 ENCOUNTER — NON-APPOINTMENT (OUTPATIENT)
Age: 59
End: 2024-07-10

## 2024-07-10 VITALS
DIASTOLIC BLOOD PRESSURE: 60 MMHG | SYSTOLIC BLOOD PRESSURE: 120 MMHG | TEMPERATURE: 99 F | RESPIRATION RATE: 18 BRPM | HEART RATE: 74 BPM | OXYGEN SATURATION: 98 %

## 2024-07-10 PROBLEM — I82.409 ACUTE EMBOLISM AND THROMBOSIS OF UNSPECIFIED DEEP VEINS OF UNSPECIFIED LOWER EXTREMITY: Chronic | Status: ACTIVE | Noted: 2024-07-01

## 2024-07-10 PROBLEM — E11.9 TYPE 2 DIABETES MELLITUS WITHOUT COMPLICATIONS: Chronic | Status: ACTIVE | Noted: 2024-07-01

## 2024-07-10 LAB
ANION GAP SERPL CALC-SCNC: 11 MMOL/L — SIGNIFICANT CHANGE UP (ref 7–14)
BASOPHILS # BLD AUTO: 0 K/UL — SIGNIFICANT CHANGE UP (ref 0–0.2)
BASOPHILS NFR BLD AUTO: 0 % — SIGNIFICANT CHANGE UP (ref 0–2)
BUN SERPL-MCNC: 13 MG/DL — SIGNIFICANT CHANGE UP (ref 7–23)
CALCIUM SERPL-MCNC: 9.1 MG/DL — SIGNIFICANT CHANGE UP (ref 8.4–10.5)
CHLORIDE SERPL-SCNC: 101 MMOL/L — SIGNIFICANT CHANGE UP (ref 98–107)
CO2 SERPL-SCNC: 26 MMOL/L — SIGNIFICANT CHANGE UP (ref 22–31)
CREAT SERPL-MCNC: 0.64 MG/DL — SIGNIFICANT CHANGE UP (ref 0.5–1.3)
EGFR: 110 ML/MIN/1.73M2 — SIGNIFICANT CHANGE UP
EOSINOPHIL # BLD AUTO: 0.02 K/UL — SIGNIFICANT CHANGE UP (ref 0–0.5)
EOSINOPHIL NFR BLD AUTO: 1.9 % — SIGNIFICANT CHANGE UP (ref 0–6)
GLUCOSE BLDC GLUCOMTR-MCNC: 121 MG/DL — HIGH (ref 70–99)
GLUCOSE SERPL-MCNC: 99 MG/DL — SIGNIFICANT CHANGE UP (ref 70–99)
HCT VFR BLD CALC: 21.8 % — LOW (ref 39–50)
HGB BLD-MCNC: 7.3 G/DL — LOW (ref 13–17)
IANC: 0.49 K/UL — LOW (ref 1.8–7.4)
LYMPHOCYTES # BLD AUTO: 0.22 K/UL — LOW (ref 1–3.3)
LYMPHOCYTES # BLD AUTO: 21.1 % — SIGNIFICANT CHANGE UP (ref 13–44)
MAGNESIUM SERPL-MCNC: 1.4 MG/DL — LOW (ref 1.6–2.6)
MCHC RBC-ENTMCNC: 31.6 PG — SIGNIFICANT CHANGE UP (ref 27–34)
MCHC RBC-ENTMCNC: 33.5 GM/DL — SIGNIFICANT CHANGE UP (ref 32–36)
MCV RBC AUTO: 94.4 FL — SIGNIFICANT CHANGE UP (ref 80–100)
MONOCYTES # BLD AUTO: 0.11 K/UL — SIGNIFICANT CHANGE UP (ref 0–0.9)
MONOCYTES NFR BLD AUTO: 10.1 % — SIGNIFICANT CHANGE UP (ref 2–14)
NEUTROPHILS # BLD AUTO: 0.68 K/UL — LOW (ref 1.8–7.4)
NEUTROPHILS NFR BLD AUTO: 63.3 % — SIGNIFICANT CHANGE UP (ref 43–77)
PHOSPHATE SERPL-MCNC: 3.8 MG/DL — SIGNIFICANT CHANGE UP (ref 2.5–4.5)
PLATELET # BLD AUTO: 24 K/UL — LOW (ref 150–400)
POTASSIUM SERPL-MCNC: 3.9 MMOL/L — SIGNIFICANT CHANGE UP (ref 3.5–5.3)
POTASSIUM SERPL-SCNC: 3.9 MMOL/L — SIGNIFICANT CHANGE UP (ref 3.5–5.3)
RBC # BLD: 2.31 M/UL — LOW (ref 4.2–5.8)
RBC # FLD: 15 % — HIGH (ref 10.3–14.5)
SODIUM SERPL-SCNC: 138 MMOL/L — SIGNIFICANT CHANGE UP (ref 135–145)
WBC # BLD: 1.04 K/UL — LOW (ref 3.8–10.5)
WBC # FLD AUTO: 1.04 K/UL — LOW (ref 3.8–10.5)

## 2024-07-10 RX ORDER — LIDOCAINE HCL 28 MG/G
5 GEL TOPICAL
Qty: 1 | Refills: 0
Start: 2024-07-10 | End: 2024-07-14

## 2024-07-10 RX ORDER — SENNOSIDES 8.6 MG
2 TABLET ORAL
Qty: 30 | Refills: 0
Start: 2024-07-10 | End: 2024-07-24

## 2024-07-10 RX ORDER — TAMSULOSIN HYDROCHLORIDE 0.4 MG/1
0 CAPSULE ORAL
Qty: 0 | Refills: 0 | DISCHARGE

## 2024-07-10 RX ORDER — OXYCODONE HYDROCHLORIDE 100 MG/5ML
2 SOLUTION ORAL
Qty: 32 | Refills: 0
Start: 2024-07-10 | End: 2024-07-13

## 2024-07-10 RX ADMIN — Medication 650 MILLIGRAM(S): at 06:22

## 2024-07-10 RX ADMIN — LOSARTAN POTASSIUM 25 MILLIGRAM(S): 100 TABLET, FILM COATED ORAL at 06:21

## 2024-07-10 RX ADMIN — APIXABAN 5 MILLIGRAM(S): 5 TABLET, FILM COATED ORAL at 06:22

## 2024-07-10 RX ADMIN — DIPHENHYDRAMINE HYDROCHLORIDE AND LIDOCAINE HYDROCHLORIDE AND ALUMINUM HYDROXIDE AND MAGNESIUM HYDRO 15 MILLILITER(S): KIT at 06:22

## 2024-07-10 RX ADMIN — Medication 650 MILLIGRAM(S): at 00:03

## 2024-07-10 RX ADMIN — Medication 4 MILLILITER(S): at 09:03

## 2024-07-10 NOTE — DISCHARGE NOTE PROVIDER - HOSPITAL COURSE
Admitted for Dehydration. History of Nasopharyngeal Cancer, S/P Chemo and radiation. Patient started on NG tube feeds and IV fluids. Swallow study done. Started on oral diet and cleared for discharge home on 7/10/24.

## 2024-07-10 NOTE — DISCHARGE NOTE PROVIDER - CARE PROVIDER_API CALL
Garcia Bauman [facility]  Otolaryngology  [address] [place] [id number]  Phone: [phone]  Fax: [phone]  Follow Up Time:

## 2024-07-10 NOTE — DISCHARGE NOTE NURSING/CASE MANAGEMENT/SOCIAL WORK - PATIENT PORTAL LINK FT
You can access the FollowMyHealth Patient Portal offered by [facility] by registering at the following website: [email or web address]. By joining [employer]’s FollowMyHealth portal, you will also be able to view your health information using other applications (apps) compatible with our system.

## 2024-07-10 NOTE — DISCHARGE NOTE PROVIDER - DETAILS OF MALNUTRITION DIAGNOSIS/DIAGNOSES
This patient has been assessed with a concern for Malnutrition and was treated during this hospitalization for the following Nutrition diagnosis/diagnoses:     -  07/02/2024: Moderate protein-calorie malnutrition

## 2024-07-10 NOTE — DISCHARGE NOTE PROVIDER - NSDCCPCAREPLAN_GEN_ALL_CORE_FT
PRINCIPAL DISCHARGE DIAGNOSIS  Diagnosis: Dehydration  Assessment and Plan of Treatment:       SECONDARY DISCHARGE DIAGNOSES  Diagnosis: Throat cancer  Assessment and Plan of Treatment:

## 2024-07-10 NOTE — DISCHARGE NOTE PROVIDER - DISCHARGE DIET
Patient Education     Bacterial Vaginosis    You have a vaginal infection called bacterial vaginosis (BV). Both good and bad bacteria are present in a healthy vagina. BV occurs when these bacteria get out of balance. The number of bad bacteria increase. And the number of good bacteria decrease. BV is linked with sexual activity, but it's not a sexually transmitted infection (STI).   BV may or may not cause symptoms. If symptoms do occur, they can include:     Thin, gray, milky-white, or sometimes green discharge    Unpleasant odor or  fishy  smell    Itching, burning, or pain in or around the vagina  It is not known what causes BV, but certain factors can make the problem more likely. These can include:     Douching    Spermicides    Use of antibiotics    Change in hormone levels with pregnancy, breastfeeding, or menopause    Having sex with a new partner    Having sex with more than one partner  BV will sometimes go away on its own. But treatment is often advised. This is because untreated BV can raise the risk of more serious health problems such as:     Pelvic inflammatory disease (PID)     delivery (giving birth to a baby early if you re pregnant)    HIV and some other sexually transmitted infections (STIs)    Infection after surgery on the reproductive organs  Home care  General care    BV is most often treated with medicines called antibiotics. These may be given as pills or as a vaginal cream. If antibiotics are prescribed, be sure to use them exactly as directed. And complete all of the medicine, even if your symptoms go away.    Don't douche or having sex during treatment.    If you have sex with a female partner, ask your healthcare provider if she should also be treated.  Prevention    Don't douche.    Don't have sex. If you do have sex, then take steps to lower your risk:  ? Use condoms when having sex.  ? Limit the number of sex partners you have.    Follow-up care  Follow up with your  healthcare provider, or as advised.   When to get medical advice  Call your healthcare provider right away if:     You have a fever of 100.4 F (38 C) or higher, or as directed by your provider.    Your symptoms get worse, or they don t go away within a few days of starting treatment.    You have new pain in the lower belly or pelvic region.    You have side effects that bother you or a reaction to the pills or cream you re prescribed.    You or any of your sex partners have new symptoms, such as a rash, joint pain, or sores.  [employer] last reviewed this educational content on 6/1/2020 2000-2021 [employer]. All rights reserved. This information is not intended as a substitute for professional medical care. Always follow your healthcare professional's instructions.           Patient Education     Yeast Infection (Candida Vaginal Infection)    You have a Candida vaginal infection. This is also known as a yeast infection. It's most often caused by a type of yeast (fungus) called Candida. Candida are normally found in the vagina. But if they increase in number, this can lead to infection and cause symptoms.   Symptoms of a yeast infection can include:     Clumpy or thin, white discharge, which may look like cottage cheese    Itching or burning    Burning with urination  Certain factors can make a yeast infection more likely. These can include:     Taking certain medicines, such as antibiotics or birth control pills    Pregnancy    Diabetes    Weak immune system  A yeast infection is most often treated with antifungal medicine. This may be given as a vaginal cream or pills you take by mouth. Treatment may last for about 1 to 7 days. Women with severe or recurrent infections may need longer courses of treatment.   Home care    If you re prescribed medicine, be sure to use it as directed. Finish all of the medicine, even if your symptoms go away. Don t try to treat yourself using over-the-counter products  without talking with your provider first. They will let you know if this is a good option for you.    Ask your provider what steps you can take to help reduce your risk of having a yeast infection in the future.    Follow-up care  Follow up with your healthcare provider, or as directed.   When to seek medical advice  Call your healthcare provider right away if:     You have a fever of 100.4 F (38 C) or higher, or as directed by your provider.    Your symptoms worsen, or they don t go away within a few days of starting treatment.    You have new pain in the lower belly or pelvic region.    You have side effects that bother you or a reaction to the cream or pills you re prescribed.    You or any partners you have sex with have new symptoms, such as a rash, joint pain, or sores.  [employer] last reviewed this educational content on 7/1/2020 2000-2021 [employer]. All rights reserved. This information is not intended as a substitute for professional medical care. Always follow your healthcare professional's instructions.            Pureed Diet

## 2024-07-10 NOTE — PROGRESS NOTE ADULT - SUBJECTIVE AND OBJECTIVE BOX
OTOLARYNGOLOGY (ENT) PROGRESS NOTE    PATIENT: JAVIER SIMS  MRN: [id number]  : 67  [id number]  DATE OF SERVICE:  24  	  Subjective/ Interval:   Pt in good spirits this AM. PO intake improving. Amenable to discharge this AM.    Physical Exam:  General: well-developed, NAD  OU: EOMI; PERRL; no drainage or redness  AU: external ears normal  Nose: nares patent. NGT in place removed at bedside.  Mouth: No oral lesions; no gross abnormalities  Neck: trachea midline  Respiratory: unlabored respirations  Cardiovascular: regular rate  Gastrointestinal: Soft, nondistended  Extremities: No edema, warm and well perfused  Skin: No lesions; no rash               MICROBIOLOGY:

## 2024-07-10 NOTE — PROGRESS NOTE ADULT - ASSESSMENT
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 Now tolerating tube feeds with PO supplements of purees and nutritional shakes ordered    - continue magic mouthwash/mucositis cocktail, pain management  - saline nebs  - monitor electrolytes  - continue tube feeds, we will monitor tolerance of PO supplements. tolerating some ensure shakes  - appreciate nutrition services recs
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 Now tolerating tube feeds with PO supplements of purees and nutritional shakes ordered    - continue magic mouthwash/mucositis cocktail  - monitor electrolytes  - continue tube feeds, we will monitor tolerance of PO supplements. reportedly tolerating some ensure shakes  - appreciate nutrition services recs
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 Now tolerating tube feeds with PO supplements of purees and nutritional shakes ordered    - continue magic mouthwash/mucositis cocktail  - start saline nebs  - monitor electrolytes  - continue tube feeds, we will monitor tolerance of PO supplements. reportedly tolerating some ensure shakes  - appreciate nutrition services recs
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 Now tolerating tube feeds with PO supplements of purees and nutritional shakes ordered    - continue magic mouthwash/mucositis cocktail  - will continue to replete magnesium, improved to 1.4 this morning from 1.1 yesterday  - continue tube feeds, we will monitor tolerance of PO supplements  - appreciate nutrition services recs
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 Now tolerating tube feeds with PO supplements of purees and nutritional shakes ordered    - continue magic mouthwash/mucositis cocktail, pain management  - saline nebs  - monitor electrolytes  - NGT removed, continue monitoring PO intake  - appreciate nutrition services recs  - Possible discharge tomorrow if PO intake adequate
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 Now tolerating tube feeds with PO supplements of purees and nutritional shakes ordered. Medically ready for discharge today    - continue magic mouthwash/mucositis cocktail, pain management  - saline nebs  - monitor electrolytes  - NGT removed, continue monitoring PO intake  - appreciate nutrition services recs  - DC home today
57 yo M PMHx DMII (on metformin last took x 3 weeks ago)  and DVT (11/2023 - on Eliquis last took x 3 weeks ago), s/p Left neck Level II FNA 1/5/24 showed metastatic nasopharyngeal carcinoma, (MRI at  on 1/30/24 showed metastatic adenopathy involving jugular chains bilaterally with bulky L palatine tonsil. completed CXRT 6/24/2024 ) p/w near syncopal episode in bathroom. 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 c/b neutropenia 
59 yo M PMHx DMII (on metformin last took x 3 weeks ago)  and DVT (11/2023 - on Eliquis last took x 3 weeks ago), s/p Left neck Level II FNA 1/5/24 showed metastatic nasopharyngeal carcinoma, (MRI at  on 1/30/24 showed metastatic adenopathy involving jugular chains bilaterally with bulky L palatine tonsil. completed CXRT 6/24/2024 ) p/w near syncopal episode in bathroom. 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 c/b neutropenia 
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 Now tolerating tube feeds with PO supplements of purees and nutritional shakes ordered    - continue magic mouthwash/mucositis cocktail, pain management  - saline nebs  - monitor electrolytes  - continue tube feeds, we will monitor tolerance of PO supplements. tolerating some ensure shakes  - appreciate nutrition services recs
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 Now tolerating tube feeds with PO supplements of purees and nutritional shakes ordered    - continue magic mouthwash/mucositis cocktail, pain management  - saline nebs  - monitor electrolytes  - continue tube feeds, we will monitor tolerance of PO supplements. tolerating some ensure shakes  - appreciate nutrition services recs
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 Now tolerating tube feeds with PO supplements of purees and nutritional shakes ordered    - continue magic mouthwash/mucositis cocktail, pain management  - saline nebs  - monitor electrolytes  - continue tube feeds, we will monitor tolerance of PO supplements. tolerating some ensure shakes  - appreciate nutrition services recs
59 yo M PMHx DMII (on metformin last took x 3 weeks ago)  and DVT (11/2023 - on Eliquis last took x 3 weeks ago), s/p Left neck Level II FNA 1/5/24 showed metastatic nasopharyngeal carcinoma, (MRI at  on 1/30/24 showed metastatic adenopathy involving jugular chains bilaterally with bulky L palatine tonsil. completed CXRT 6/24/2024 ) p/w near syncopal episode in bathroom. 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 
59 yo M PMHx DMII (on metformin last took x 3 weeks ago)  and DVT (11/2023 - on Eliquis last took x 3 weeks ago), s/p Left neck Level II FNA 1/5/24 showed metastatic nasopharyngeal carcinoma, (MRI at  on 1/30/24 showed metastatic adenopathy involving jugular chains bilaterally with bulky L palatine tonsil. completed CXRT 6/24/2024 ) p/w near syncopal episode in bathroom. 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 c/b neutropenia 
57 yo M PMHx DMII (on metformin last took x 3 weeks ago)  and DVT (11/2023 - on Eliquis last took x 3 weeks ago), s/p Left neck Level II FNA 1/5/24 showed metastatic nasopharyngeal carcinoma, (MRI at  on 1/30/24 showed metastatic adenopathy involving jugular chains bilaterally with bulky L palatine tonsil. completed CXRT 6/24/2024 ) p/w near syncopal episode in bathroom. 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 
57 yo M PMHx DMII (on metformin last took x 3 weeks ago)  and DVT (11/2023 - on Eliquis last took x 3 weeks ago), s/p Left neck Level II FNA 1/5/24 showed metastatic nasopharyngeal carcinoma, (MRI at  on 1/30/24 showed metastatic adenopathy involving jugular chains bilaterally with bulky L palatine tonsil. completed CXRT 6/24/2024 ) p/w near syncopal episode in bathroom. 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

## 2024-07-10 NOTE — PROGRESS NOTE ADULT - PROVIDER SPECIALTY LIST ADULT
ENT
Hospitalist
Hospitalist
ENT
Hospitalist

## 2024-07-10 NOTE — DISCHARGE NOTE PROVIDER - NSDCMRMEDTOKEN_GEN_ALL_CORE_FT
atorvastatin 40 mg oral tablet: 1 tab(s) orally once a day  Eliquis 5 mg oral tablet: 1 tab(s) orally 2 times a day  FUROSEMIDE 40MG TABLETS: 1 tab(s) orally once a day  Jardiance 25 mg oral tablet: 1 tab(s) orally once a day  lidocaine 2% mucous membrane solution: Apply topically to affected area 2 times a day  losartan 25 mg oral tablet: 1 tab(s) orally once a day  magnesium oxide 400 mg oral tablet: 1 tab(s) orally once a day  metFORMIN 500 mg oral tablet: 2 tab(s) orally 2 times a day  ondansetron 8 mg oral tablet: 1 tab(s) orally once a day as needed for  nausea  oxyCODONE 5 mg oral tablet: 2 tab(s) orally every 6 hours as needed for  moderate pain MDD: 8  senna leaf extract oral tablet: 2 tab(s) orally once a day (at bedtime)

## 2024-07-10 NOTE — DISCHARGE NOTE PROVIDER - NSDCFUSCHEDAPPT_GEN_ALL_CORE_FT
[facility] Physician [facility]  EDINSON Schumacher E Brooks S  Scheduled Appointment: 07/22/2024

## 2024-07-10 NOTE — PROGRESS NOTE ADULT - NUTRITIONAL ASSESSMENT
This patient has been assessed with a concern for Malnutrition and has been determined to have a diagnosis/diagnoses of Moderate protein-calorie malnutrition.    This patient is being managed with:   Diet Pureed-  Consistent Carbohydrate {Evening Snack} (CSTCHOSN)  Tube Feeding Modality: Nasogastric  Glucerna 1.5 Del (GLUCERNA1.5RTH)  Total Volume for 24 Hours (mL): 1440  Bolus  Total Volume of Bolus (mL):  360  Total # of Feeds: 4  Tube Feed Frequency: Every 6 hours   Tube Feed Start Time: 08:00  Bolus Feed Rate (mL per Hour): 360   Bolus Feed Duration (in Hours): 0  Bolus Feed Instructions:  Please give 180ml for the first bolus feed  Free Water Flush  Bolus   Total Volume per Flush (mL): 300   Frequency: Every 6 Hours  Supplement Feeding Modality:  Oral  Glucerna Shake Cans or Servings Per Day:  1       Frequency:  Daily  Entered: Jul  3 2024  8:10AM  

## 2024-07-10 NOTE — PROGRESS NOTE ADULT - REASON FOR ADMISSION
Dehyrdation
Dehydration

## 2024-07-12 ENCOUNTER — RESULT REVIEW (OUTPATIENT)
Age: 59
End: 2024-07-12

## 2024-07-12 ENCOUNTER — APPOINTMENT (OUTPATIENT)
Dept: HEMATOLOGY ONCOLOGY | Facility: CLINIC | Age: 59
End: 2024-07-12
Payer: MEDICAID

## 2024-07-12 VITALS
TEMPERATURE: 97.3 F | OXYGEN SATURATION: 100 % | SYSTOLIC BLOOD PRESSURE: 122 MMHG | HEIGHT: 69 IN | BODY MASS INDEX: 27.22 KG/M2 | HEART RATE: 111 BPM | DIASTOLIC BLOOD PRESSURE: 80 MMHG | WEIGHT: 183.75 LBS

## 2024-07-12 DIAGNOSIS — I82.409 ACUTE EMBOLISM AND THROMBOSIS OF UNSPECIFIED DEEP VEINS OF UNSPECIFIED LOWER EXTREMITY: ICD-10-CM

## 2024-07-12 LAB
ANISOCYTOSIS BLD QL: SLIGHT — SIGNIFICANT CHANGE UP
BASOPHILS # BLD AUTO: 0 K/UL — SIGNIFICANT CHANGE UP (ref 0–0.2)
BASOPHILS NFR BLD AUTO: 0 % — SIGNIFICANT CHANGE UP (ref 0–2)
DACRYOCYTES BLD QL SMEAR: SLIGHT — SIGNIFICANT CHANGE UP
ELLIPTOCYTES BLD QL SMEAR: SLIGHT — SIGNIFICANT CHANGE UP
EOSINOPHIL # BLD AUTO: 0 K/UL — SIGNIFICANT CHANGE UP (ref 0–0.5)
EOSINOPHIL NFR BLD AUTO: 0 % — SIGNIFICANT CHANGE UP (ref 0–6)
HCT VFR BLD CALC: 24.2 % — LOW (ref 39–50)
HGB BLD-MCNC: 8 G/DL — LOW (ref 13–17)
HYPOCHROMIA BLD QL: SLIGHT — SIGNIFICANT CHANGE UP
LG PLATELETS BLD QL AUTO: SLIGHT — SIGNIFICANT CHANGE UP
LYMPHOCYTES # BLD AUTO: 0.4 K/UL — LOW (ref 1–3.3)
LYMPHOCYTES # BLD AUTO: 22 % — SIGNIFICANT CHANGE UP (ref 13–44)
MACROCYTES BLD QL: SLIGHT — SIGNIFICANT CHANGE UP
MCHC RBC-ENTMCNC: 32.3 PG — SIGNIFICANT CHANGE UP (ref 27–34)
MCHC RBC-ENTMCNC: 32.9 G/DL — SIGNIFICANT CHANGE UP (ref 32–36)
MCV RBC AUTO: 98.2 FL — SIGNIFICANT CHANGE UP (ref 80–100)
MICROCYTES BLD QL: SLIGHT — SIGNIFICANT CHANGE UP
MONOCYTES # BLD AUTO: 0.3 K/UL — SIGNIFICANT CHANGE UP (ref 0–0.9)
MONOCYTES NFR BLD AUTO: 14 % — SIGNIFICANT CHANGE UP (ref 2–14)
NEUTROPHILS # BLD AUTO: 1 K/UL — LOW (ref 1.8–7.4)
NEUTROPHILS NFR BLD AUTO: 64 % — SIGNIFICANT CHANGE UP (ref 43–77)
OVALOCYTES BLD QL SMEAR: SLIGHT — SIGNIFICANT CHANGE UP
PLAT MORPH BLD: NORMAL — SIGNIFICANT CHANGE UP
PLATELET # BLD AUTO: 29 K/UL — LOW (ref 150–400)
POIKILOCYTOSIS BLD QL AUTO: SLIGHT — SIGNIFICANT CHANGE UP
POLYCHROMASIA BLD QL SMEAR: SLIGHT — SIGNIFICANT CHANGE UP
RBC # BLD: 2.47 M/UL — LOW (ref 4.2–5.8)
RBC # FLD: 14.6 % — HIGH (ref 10.3–14.5)
RBC BLD AUTO: SIGNIFICANT CHANGE UP
ROULEAUX BLD QL SMEAR: PRESENT — SIGNIFICANT CHANGE UP
SMUDGE CELLS # BLD: PRESENT — SIGNIFICANT CHANGE UP
SPHEROCYTES BLD QL SMEAR: SLIGHT — SIGNIFICANT CHANGE UP
WBC # BLD: 1.8 K/UL — LOW (ref 3.8–10.5)
WBC # FLD AUTO: 1.8 K/UL — LOW (ref 3.8–10.5)

## 2024-07-12 PROCEDURE — 99214 OFFICE O/P EST MOD 30 MIN: CPT

## 2024-07-12 RX ORDER — APIXABAN 5 MG/1
5 TABLET, FILM COATED ORAL
Qty: 60 | Refills: 2 | Status: ACTIVE | COMMUNITY
Start: 2024-07-12 | End: 1900-01-01

## 2024-07-12 RX ORDER — OXYCODONE 5 MG/1
5 TABLET ORAL EVERY 6 HOURS
Qty: 8 | Refills: 0 | Status: ACTIVE | COMMUNITY
Start: 2024-07-12 | End: 1900-01-01

## 2024-07-15 ENCOUNTER — APPOINTMENT (OUTPATIENT)
Dept: OTOLARYNGOLOGY | Facility: CLINIC | Age: 59
End: 2024-07-15
Payer: MEDICAID

## 2024-07-15 VITALS
HEIGHT: 69 IN | WEIGHT: 185 LBS | DIASTOLIC BLOOD PRESSURE: 68 MMHG | BODY MASS INDEX: 27.4 KG/M2 | SYSTOLIC BLOOD PRESSURE: 102 MMHG

## 2024-07-15 DIAGNOSIS — C11.9 MALIGNANT NEOPLASM OF NASOPHARYNX, UNSPECIFIED: ICD-10-CM

## 2024-07-15 DIAGNOSIS — E86.0 DEHYDRATION: ICD-10-CM

## 2024-07-15 DIAGNOSIS — E46 UNSPECIFIED PROTEIN-CALORIE MALNUTRITION: ICD-10-CM

## 2024-07-15 PROCEDURE — 99214 OFFICE O/P EST MOD 30 MIN: CPT | Mod: 25

## 2024-07-15 PROCEDURE — 31231 NASAL ENDOSCOPY DX: CPT

## 2024-07-15 RX ORDER — OXYCODONE 5 MG/1
5 TABLET ORAL EVERY 6 HOURS
Qty: 28 | Refills: 0 | Status: ACTIVE | COMMUNITY
Start: 2024-07-15 | End: 1900-01-01

## 2024-07-16 NOTE — CONSULT NOTE ADULT - PROBLEM SELECTOR PROBLEM 2
Problem: PAIN - ADULT  Goal: Verbalizes/displays adequate comfort level or baseline comfort level  Description: Interventions:  - Encourage patient to monitor pain and request assistance  - Assess pain using appropriate pain scale  - Administer analgesics based on type and severity of pain and evaluate response  - Implement non-pharmacological measures as appropriate and evaluate response  - Consider cultural and social influences on pain and pain management  - Notify physician/advanced practitioner if interventions unsuccessful or patient reports new pain  Outcome: Progressing     Problem: INFECTION - ADULT  Goal: Absence or prevention of progression during hospitalization  Description: INTERVENTIONS:  - Assess and monitor for signs and symptoms of infection  - Monitor lab/diagnostic results  - Monitor all insertion sites, i.e. indwelling lines, tubes, and drains  - Monitor endotracheal if appropriate and nasal secretions for changes in amount and color  - [place] appropriate cooling/warming therapies per order  - Administer medications as ordered  - Instruct and encourage patient and family to use good hand hygiene technique  - Identify and instruct in appropriate isolation precautions for identified infection/condition  Outcome: Progressing  Goal: Absence of fever/infection during neutropenic period  Description: INTERVENTIONS:  - Monitor WBC    Outcome: Progressing     Problem: SAFETY ADULT  Goal: Patient will remain free of falls  Description: INTERVENTIONS:  - Educate patient/family on patient safety including physical limitations  - Instruct patient to call for assistance with activity   - Consult OT/PT to assist with strengthening/mobility   - Keep Call bell within reach  - Keep bed low and locked with side rails adjusted as appropriate  - Keep care items and personal belongings within reach  - Initiate and maintain comfort rounds  - Make Fall Risk Sign visible to staff  - Offer Toileting every 2 Hours,  in advance of need  - Initiate/Maintain bed alarm  - Obtain necessary fall risk management equipment  - Apply yellow socks and bracelet for high fall risk patients  - Consider moving patient to room near nurses station  Outcome: Progressing  Goal: Maintain or return to baseline ADL function  Description: INTERVENTIONS:  -  Assess patient's ability to carry out ADLs; assess patient's baseline for ADL function and identify physical deficits which impact ability to perform ADLs (bathing, care of mouth/teeth, toileting, grooming, dressing, etc.)  - Assess/evaluate cause of self-care deficits   - Assess range of motion  - Assess patient's mobility; develop plan if impaired  - Assess patient's need for assistive devices and provide as appropriate  - Encourage maximum independence but intervene and supervise when necessary  - Involve family in performance of ADLs  - Assess for home care needs following discharge   - Consider OT consult to assist with ADL evaluation and planning for discharge  - Provide patient education as appropriate  Outcome: Progressing  Goal: Maintains/Returns to pre admission functional level  Description: INTERVENTIONS:  - Perform AM-PAC 6 Click Basic Mobility/ Daily Activity assessment daily.  - Set and communicate daily mobility goal to care team and patient/family/caregiver.   - Collaborate with rehabilitation services on mobility goals if consulted  - Perform Range of Motion 3 times a day.  - Reposition patient every 2 hours.  - Dangle patient 3 times a day  - Stand patient 3 times a day  - Ambulate patient 3 times a day  - Out of bed to chair 3 times a day   - Out of bed for meals 3 times a day  - Out of bed for toileting  - Record patient progress and toleration of activity level   Outcome: Progressing     Problem: DISCHARGE PLANNING  Goal: Discharge to home or other facility with appropriate resources  Description: INTERVENTIONS:  - Identify barriers to discharge w/patient and caregiver  -  Arrange for needed discharge resources and transportation as appropriate  - Identify discharge learning needs (meds, wound care, etc.)  - Arrange for interpretive services to assist at discharge as needed  - Refer to Case Management Department for coordinating discharge planning if the patient needs post-hospital services based on physician/advanced practitioner order or complex needs related to functional status, cognitive ability, or social support system  Outcome: Progressing     Problem: Knowledge Deficit  Goal: Patient/family/caregiver demonstrates understanding of disease process, treatment plan, medications, and discharge instructions  Description: Complete learning assessment and assess knowledge base.  Interventions:  - Provide teaching at level of understanding  - Provide teaching via preferred learning methods  Outcome: Progressing      Nasopharyngeal cancer

## 2024-07-22 ENCOUNTER — NON-APPOINTMENT (OUTPATIENT)
Age: 59
End: 2024-07-22

## 2024-07-22 ENCOUNTER — APPOINTMENT (OUTPATIENT)
Dept: PHYSICAL MEDICINE AND REHAB | Facility: CLINIC | Age: 59
End: 2024-07-22
Payer: MEDICAID

## 2024-07-22 VITALS — WEIGHT: 180 LBS | HEIGHT: 69 IN | BODY MASS INDEX: 26.66 KG/M2

## 2024-07-22 DIAGNOSIS — I89.0 LYMPHEDEMA, NOT ELSEWHERE CLASSIFIED: ICD-10-CM

## 2024-07-22 DIAGNOSIS — R13.10 DYSPHAGIA, UNSPECIFIED: ICD-10-CM

## 2024-07-22 DIAGNOSIS — G89.3 NEOPLASM RELATED PAIN (ACUTE) (CHRONIC): ICD-10-CM

## 2024-07-22 DIAGNOSIS — M79.2 NEURALGIA AND NEURITIS, UNSPECIFIED: ICD-10-CM

## 2024-07-22 PROCEDURE — 99204 OFFICE O/P NEW MOD 45 MIN: CPT

## 2024-07-22 RX ORDER — OXYCODONE 5 MG/1
5 TABLET ORAL
Qty: 56 | Refills: 0 | Status: ACTIVE | COMMUNITY
Start: 2024-07-22 | End: 1900-01-01

## 2024-07-22 RX ORDER — GABAPENTIN 300 MG/1
300 CAPSULE ORAL 3 TIMES DAILY
Qty: 90 | Refills: 2 | Status: ACTIVE | COMMUNITY
Start: 2024-07-22 | End: 1900-01-01

## 2024-07-22 RX ORDER — NALOXONE HYDROCHLORIDE 4 MG/.1ML
4 SPRAY NASAL
Qty: 1 | Refills: 0 | Status: ACTIVE | COMMUNITY
Start: 2024-07-22 | End: 1900-01-01

## 2024-07-22 NOTE — HISTORY OF PRESENT ILLNESS
[FreeTextEntry1] : Mr. Jenkins is a 58 year old male with NPC with bilateral metastatic LAD which is confirmed with PET/CT - cT1 N2 M0.  He is s/p CXRT completed June 24, 2024.   He reports he still is having significant pain in his throat and mouth region.  Worse with eating.  Feels burning especially with any eating.  Reports he is no longer taking gabapentin but unsure why he stopped.  Denies any previous side effects.  Taking oxycodone as needed for severe pain.  Denies any major side effects to this.  Taking it 4 times a day.  Reports does help when he takes it.  Has some difficulty with shoulder range of motion.  No new focal weakness.

## 2024-07-22 NOTE — ASSESSMENT
[FreeTextEntry1] : 58 year old male presenting for evaluation.    #Chronic pain after cancer treatment: -Start gabapentin 300mg TID-rx sent -Oxycodone 5mg q 6 hours severe pain, discussed goal will be to wean -Narcan sent and education provided -Pain contract signed -I Stop # [id number]  #Dysphagia: -SLP evaluation  #Lymphedema: -Hold MLD until improvement in thrombocytopenia  #Scapula wining: -Hold PT until improvement in thrombocytopenia  Follow up in 2 weeks.

## 2024-07-22 NOTE — ASSESSMENT
[FreeTextEntry1] : 58 year old male presenting for evaluation.    #Chronic pain after cancer treatment: -Start gabapentin 300mg TID-rx sent -Oxycodone 5mg q 6 hours severe pain, discussed goal will be to wean -Narcan sent and education provided -Pain contract signed -I Stop # [phone]  #Dysphagia: -SLP evaluation  #Lymphedema: -Hold MLD until improvement in thrombocytopenia  #Scapula wining: -Hold PT until improvement in thrombocytopenia  Follow up in 2 weeks.

## 2024-07-22 NOTE — PHYSICAL EXAM
[FreeTextEntry1] : Gen: Patient is A&O x 3, NAD HEENT: EOMI, hearing grossly normal Resp: regular, non - labored CV: pulses regular Skin: no rashes, erythema Lymph: +Cervical edema  Inspection: +Right scapula winging  ROM: full throughout Palpation:no tenderness to palpation Sensation: intact to light touch Reflexes: 1+ and symmetric throughout Strength: 5/5 throughout Gait: normal, non-antalgic

## 2024-07-26 ENCOUNTER — APPOINTMENT (OUTPATIENT)
Dept: OTOLARYNGOLOGY | Facility: CLINIC | Age: 59
End: 2024-07-26
Payer: MEDICAID

## 2024-07-26 PROCEDURE — 92610 EVALUATE SWALLOWING FUNCTION: CPT | Mod: GN

## 2024-07-29 ENCOUNTER — APPOINTMENT (OUTPATIENT)
Dept: RADIATION ONCOLOGY | Facility: CLINIC | Age: 59
End: 2024-07-29
Payer: MEDICAID

## 2024-07-29 VITALS
HEIGHT: 69 IN | SYSTOLIC BLOOD PRESSURE: 126 MMHG | WEIGHT: 183 LBS | TEMPERATURE: 98.9 F | DIASTOLIC BLOOD PRESSURE: 90 MMHG | BODY MASS INDEX: 27.11 KG/M2 | OXYGEN SATURATION: 100 % | HEART RATE: 100 BPM | RESPIRATION RATE: 16 BRPM

## 2024-07-29 PROCEDURE — 99024 POSTOP FOLLOW-UP VISIT: CPT

## 2024-07-29 NOTE — HISTORY OF PRESENT ILLNESS
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 Started on induction chemotherapy, but had severe hematologic side effects.  Completing chemoradiation with weekly Cisplatin on 6/24/24.  Interval history: Met with Avila Diaz 7/1/24 and had a near syncopal episode. He was sent to [facility] with dehydration, pancytopenia and weakness and discharged 7/10/24  He again met with Avila Diaz 7/15/24:    Nasal Endoscopy: Posttreatment changes, no obvious lesion in the NPx. Mild mucositis.   Met with Femi Perez 7/22/24 (Gabapentin/Oxycodone) and Alethea Yu (speech) Reports xerostomia, improving dysgeusia, dysphagia and  fatigue  No  odynophagia, neck  pain or further weight loss  care team Al [facility] Med Onc Andrew Fischer 7/12/24 PMR Femi Perez/Alethea Yu

## 2024-07-29 NOTE — VITALS
[Maximal Pain Intensity: 0/10] : 0/10 [Least Pain Intensity: 0/10] : 0/10 [Opioid] : opioid [Adjuvant (neuroleptics)] : adjuvant (neuroleptics) [80: Normal activity with effort; some signs or symptoms of disease.] : 80: Normal activity with effort; some signs or symptoms of disease.

## 2024-07-29 NOTE — PHYSICAL EXAM
[Cervical Lymph Nodes Enlarged Posterior Bilaterally] : posterior cervical [Cervical Lymph Nodes Enlarged Anterior Bilaterally] : anterior cervical [Supraclavicular Lymph Nodes Enlarged Bilaterally] : supraclavicular [Axillary Lymph Nodes Enlarged Bilaterally] : axillary [Normal] : oriented to person, place and time, the affect was normal, the mood was normal and not anxious

## 2024-07-29 NOTE — REVIEW OF SYSTEMS
[Fatigue] : fatigue [Dysphagia] : dysphagia [Negative] : Allergic/Immunologic [Dysphagia: Grade 1 - Symptomatic, able to eat regular diet] : Dysphagia: Grade 1 - Symptomatic, able to eat regular diet [Edema Limbs: Grade 0] : Edema Limbs: Grade 0  [Fatigue: Grade 1 - Fatigue relieved by rest] : Fatigue: Grade 1 - Fatigue relieved by rest [Localized Edema: Grade 0] : Localized Edema: Grade 0  [Neck Edema: Grade 0] : Neck Edema: Grade 0 [Mucositis Oral: Grade 0] : Mucositis Oral: Grade 0  [Xerostomia: Grade 1 - Symptomatic (e.g., dry or thick saliva) without significant dietary alteration; unstimulated saliva flow >0.2 ml/min] : Xerostomia: Grade 1 - Symptomatic (e.g., dry or thick saliva) without significant dietary alteration; unstimulated saliva flow >0.2 ml/min [Oral Pain: Grade 0] : Oral Pain: Grade 0 [Dysgeusia: Grade 1- Altered taste but no change in diet] : Dysgeusia: Grade 1 - Altered taste but no change in diet [Skin Hyperpigmentation: Grade 0] : Skin Hyperpigmentation: Grade 0 [Dermatitis Radiation: Grade 0] : Dermatitis Radiation: Grade 0

## 2024-07-29 NOTE — HISTORY OF PRESENT ILLNESS
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 Started on induction chemotherapy, but had severe hematologic side effects.  Completing chemoradiation with weekly Cisplatin on 6/24/24.  Interval history: Met with Avila Diaz 7/1/24 and had a near syncopal episode. He was sent to [facility] with dehydration, pancytopenia and weakness and discharged 7/10/24  He again met with Avila Diaz 7/15/24:    Nasal Endoscopy: Posttreatment changes, no obvious lesion in the NPx. Mild mucositis.   Met with Femi Perez 7/22/24 (Gabapentin/Oxycodone) and Alethea Yu (speech) Reports xerostomia, improving dysgeusia, dysphagia and  fatigue  No  odynophagia, neck  pain or further weight loss  care team Al [facility] Med On[facility] Andrew Fischer 7/12/24 PMR Femi Perez/Alethea Yu

## 2024-07-31 ENCOUNTER — APPOINTMENT (OUTPATIENT)
Dept: OTOLARYNGOLOGY | Facility: CLINIC | Age: 59
End: 2024-07-31
Payer: MEDICAID

## 2024-07-31 PROCEDURE — 92526 ORAL FUNCTION THERAPY: CPT | Mod: GN

## 2024-08-05 ENCOUNTER — APPOINTMENT (OUTPATIENT)
Dept: PHYSICAL MEDICINE AND REHAB | Facility: CLINIC | Age: 59
End: 2024-08-05

## 2024-08-05 PROCEDURE — 99214 OFFICE O/P EST MOD 30 MIN: CPT

## 2024-08-05 NOTE — HISTORY OF PRESENT ILLNESS
[FreeTextEntry1] : Mr. Jenkins is a 58 year old male with NPC with bilateral metastatic LAD which is confirmed with PET/CT - cT1 N2 M0.  He is s/p CXRT completed June 24, 2024.   He reports his pain is improving.  Gabapentin is helping no side effects, still worst at night.  Working with SLP for swallow function.  Still some discomfort at night.  Stopped taking oxycodone.

## 2024-08-05 NOTE — ASSESSMENT
December 19, 2017    Dear Buddy,     I had the pleasure of seeing Jim Willingham in the [facility] today.     As you know he is a pleasant 61 year old male with a long standing history of non- ischemic cardiomyopathy s/p LVAD placement on 6/19/2017, followed by rehabilitation without any major complications or adverse events.    The biggest issue he has been increasing recently has been recurrent productive cough which has worsened his asthma symptoms. He has been on several prednisone pulses as well as several courses of antibiotics. He is presently finishing a course of antibiotics now. He does feel that this is improved his symptoms however continues to fill short of breath from this. He has not seen pulmonary in nearly a year at [facility].     He is also gained several pounds with recurrent prednisone bursts. He also feels his recurrent cough and shortness of breath have made it difficult for him to exercise.   Prior to this his breathing felt excellent. He recovered very well from his LVAD implantation overall. He has had a few bad alarms when his power module has come disconnected from the wall due to faulty outlet. He is scheduled to have this fixed.      He presently denies palpitations, syncope, chest pain, abdominal pain, stroke symptoms, syncope, loss of consciousness, orthopnea, PND. He denies any driveline erythema.    PAST MEDICAL HISTORY:  Past Medical History:   Diagnosis Date     Benign essential hypertension 5/11/2017     Cardiomyopathy, unspecified 5/8/2017     CKD (chronic kidney disease) stage 3, GFR 30-59 ml/min 5/11/2017     Depression 5/11/2017     Diabetes mellitus (H) 5/11/2017     H/O gastric bypass 5/11/2017     ICD (implantable cardioverter-defibrillator), biventricular, in situ 5/11/2017     NICM (nonischemic cardiomyopathy) (H)/ EF 20% 5/11/2017    ECHO: LVEDd. 7.66 cm, Restrictive pattern , Severe mitral valve regurgitation     CECILIA (obstructive  [FreeTextEntry1] : 58 year old male presenting for evaluation.    #Chronic pain after cancer treatment: -Increase gabapentin to 300mg morning and afternoon, 600mg at night-rx sent -Stopped oxycodone   #Dysphagia: -Continue SLPT   #Lymphedema: -Hold MLD until improvement in thrombocytopenia  #Scapula wining: -Hold PT until improvement in thrombocytopenia  Follow up in 3-4 weeks.   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 They have several children (from separate partners) and they have just adopted a granddaughter who he is the primary care giver.     He stopped ETOH in  2010 and would drink socially before that but never had any life difficulties related to alcohol abuse.     He stopped smoking in 1994. No ilicits.     Family history: there is no family history of premature MI or SCD, or CHF    CURRENT MEDICATIONS:  Current Outpatient Prescriptions   Medication Sig Dispense Refill     warfarin (COUMADIN) 5 MG tablet Take 2 tablets (10 mg) by mouth daily 60 tablet 5     pantoprazole (PROTONIX) 40 MG EC tablet Take 1 tablet (40 mg) by mouth every morning 30 tablet 5     bumetanide (BUMEX) 1 MG tablet Take 2 mg by mouth daily Alternate 1mg daily with 2mg daily, every other day.  30 tablet      spironolactone (ALDACTONE) 25 MG tablet Take 1 tablet (25 mg) by mouth daily 30 tablet 5     losartan (COZAAR) 25 MG tablet Take 1 tablet (25 mg) by mouth daily 60 tablet 3     predniSONE (DELTASONE) 20 MG tablet Take 1 tablet (20 mg) by mouth daily 20 tablet 0     metoprolol (TOPROL-XL) 25 MG 24 hr tablet Take 1 tablet (25 mg) by mouth 2 times daily 180 tablet 3     aspirin 81 MG chewable tablet 1 tablet (81 mg) by Oral or Feeding Tube route daily 36 tablet      senna-docusate (SENOKOT-S;PERICOLACE) 8.6-50 MG per tablet Take 2 tablets by mouth 2 times daily as needed for constipation 100 tablet 0     warfarin (COUMADIN) 3 MG tablet Use as directed. 30 tablet 0     traMADol (ULTRAM) 50 MG tablet Take 2 tablets (100 mg) by mouth every 6 hours as needed for moderate pain or breakthrough pain 28 tablet      acetaminophen (TYLENOL) 325 MG tablet Take 2  tablets (650 mg) by mouth every 6 hours 100 tablet      polyethylene glycol (MIRALAX/GLYCOLAX) Packet Take 17 g by mouth daily as needed for constipation (for no bm in 1 day) 7 packet      albuterol (ALBUTEROL) 108 (90 BASE) MCG/ACT Inhaler Inhale 2 puffs into the lungs every 4 hours as needed for shortness of breath / dyspnea or wheezing       ipratropium - albuterol 0.5 mg/2.5 mg/3 mL (DUONEB) 0.5-2.5 (3) MG/3ML neb solution Take 3 mLs by nebulization every 4 hours as needed for shortness of breath / dyspnea or wheezing       citalopram (CELEXA) 20 MG tablet Take 20 mg by mouth daily       cyanocobalamin (VITAMIN B12) 1000 MCG/ML injection Inject 1,000 mcg into the muscle every 30 days       fluticasone-vilanterol (BREO ELLIPTA) 200-25 MCG/INH oral inhaler Inhale 1 puff into the lungs daily       montelukast (SINGULAIR) 10 MG tablet Take 10 mg by mouth At Bedtime       umeclidinium (INCRUSE ELLIPTA) 62.5 MCG/INH oral inhaler Inhale 1 puff into the lungs daily       zinc sulfate (ZINCATE) 220 (50 ZN) MG capsule Take 220 mg by mouth 2 times daily       enoxaparin (ENOXAPARIN) 120 MG/0.8ML injection Inject 110mgs subq every 12 hours as directed by the Anticoagulation Clinic 10 Syringe 1     COLCHICINE PO Take by mouth daily       allopurinol (ZYLOPRIM) 100 MG tablet Take 1 tablet (100 mg) by mouth daily (Patient not taking: Reported on 12/19/2017) 30 tablet 4     metFORMIN (GLUCOPHAGE) 500 MG tablet Take 0.5 tablets (250 mg) by mouth 2 times daily (with meals) (Patient not taking: Reported on 12/19/2017) 60 tablet 0     traZODone (DESYREL) 50 MG tablet Take 50 mg by mouth nightly x one week then reduce to 25 mg (1/2 tab) by mouth nightly x one week then stop. (Patient not taking: Reported on 8/11/2017) 11 tablet 0     HYDROmorphone (DILAUDID) 2 MG tablet Take 1 tablet (2 mg) by mouth every 4 hours as needed for moderate to severe pain (Patient not taking: Reported on 7/25/2017) 45 tablet 0       ROS:  "  Constitutional: No fever, chills, or sweats. Weight is stable at present  ENT: No visual disturbance, ear ache, epistaxis, sore throat.   Allergies/Immunologic: Negative.   Respiratory: No cough, hemoptysis.   Cardiovascular: As per HPI.   GI: No nausea, vomiting, hematemesis, melena, or hematochezia.   : No urinary frequency, dysuria, or hematuria.   Integument: Negative.   Psychiatric: worried about the state of his health    Neuro: Negative.   Endocrinology: Negative.   Musculoskeletal: Negative.    EXAM:  BP (!) 86/0 (BP Location: Left arm, Cuff Size: Adult Large)  Pulse 61  Temp 98  F (36.7  C) (Oral)  Ht 1.727 m (5' 8\")  Wt 119.9 kg (264 lb 4.8 oz)  SpO2 95%  BMI 40.19 kg/m2  General: appears comfortable, alert and articulate, obese   Head: normocephalic, atraumatic  Eyes: anicteric sclera, EOMI  Neck: no adenopathy  Orophyarynx: moist mucosa, no lesions, dentition intact  Lungs: + wheezing, no crackles, + upper airway rhonchi   Heart: LVAD hum heart throughout precordium, JVP appears to mid neck,  trace to 1+ LE edema  Abdomen: soft, non-tender, bowel sounds present, no hepatosplenomegaly   Neurological: normal speech and affect, no gross motor deficits    Last coronary angiogram and RHC May 10, 2017         Pressures      Phase:Baseline      AO :  70.00 mmHg / 49.00 mmHg ( 57.00 mmHg )  @ 10:39:59 AM            72.00 mmHg / 49.00 mmHg ( 58.00 mmHg )  @ 10:40:10 AM      LV :  83.00 mmHg / 16.00 mmHg / 23.00 mmHg  @ 10:46:50 AM      LV Pull back :  85.00 mmHg / 15.00 mmHg / 23.00 mmHg  @ 10:46:59 AM      AO Pull back :  86.00 mmHg / 51.00 mmHg ( 74.00 mmHg )  @ 10:47:06 AM      RA :  a wave = v wave = mean = 15.00 mmHg  @ 11:06:43 AM      RV :  44.00 mmHg / 15.00 mmHg / 18.00 mmHg  @ 11:06:27 AM      PA :  41.00 mmHg / 20.00 mmHg ( 32.00 mmHg )  @ 10:59:05 AM      PCW :  a wave = v wave = mean = 32.00 mmHg  @ 11:00:00 AM    Valves      Phase:Baseline      AV :  0.00 mmHg  @ 10:22:09 AM      AV Mean " Gradient:  13.70 mmHg  @ 10:22:09 AM      AV Valve Area:  1.27 cm2  @ 10:22:09 AM      Cardiac Output      Phase:Baseline      Thermo Cardiac Output:  4.77 l/min  @ 10:22:09 AM    Flow      Phase:Baseline      Qp :  4.77 l/min  @ 10:22:09 AM      Qs :  4.77 l/min  @ 10:22:09 AM       Status       Last echocardiogram:    Severely increased left ventricular size. LVEDd is 7.66 cm   2. LV function is severely reduced. The visually estimated ejection fraction is 20%.   3. Septal motion consistent with conduction delay.   4. Restrictive pattern of diastolic filling consistent with severe diastolic dysfunction.   5. Severely dilated left atrium.   6. Severe mitral valve regurgitation.   7. Moderate pulmonary hypertension. PAP 48 mmHG plus RAP, or approximately 55-58 mmHG.   8. Compared to prior study (1/26/2016); The EF remains severely depressed at 20%. Mitral regurgitation has increased from moderate to severe, likely due to the patient's cardiomyopathy. PAP has increased from 22 mmHG plus RAP to 48 mmHG feliz RAP.   9. The rhythm is normal sinus.  ualized. Trace pulmonic valve regurgitation.    LVIDd (2D):     7.66 cm (3.4-5.7) LA Major diam,s, A2c 7.5 cm      Pulmonary function tests:   The Patient reportedly appeared to give maximal effort.  The PFT   testing was performed on calibrated equipment and recorded in   compliance with the ATS/ERS Task Force Standardization of Lung   Function Testing. The PFT testing was performed in the sitting   position.    After acceptable spirograms had been obtained, the following   values were obtained and/or calculated:    Pre bronchodilator Spirometry:   SVC 2.35 L (61 %)  DLCO unc 16.89 ml/min/mmHg (59 %)  DLCOcor 17.57 ml/min/mmHg (62 %)  HGB 13.3 gm/dL    INTERPRETATION:    Slow vital capacity (SVC) is moderately reduced.    The DLCO is corrected for hemoglobin and is moderately reduced.    An isolated reduction in DLCO with normal lung volumes is seen in   patients with anemia,  chronic pulmonary embolism, primary   pulmonary hypertension, carboxyhemoglobinemia, vasculitis or   early interstitial lung disease.    ECG: (per north) V pacing rate 90     Labs:    Lab Results   Component Value Date    WBC 9.7 12/19/2017    HGB 12.1 (L) 12/19/2017    HCT 40.1 12/19/2017     12/19/2017     (L) 12/19/2017    POTASSIUM 4.2 12/19/2017    CHLORIDE 94 12/19/2017    CO2 30 12/19/2017    BUN 56 (H) 12/19/2017    CR 1.85 (H) 12/19/2017     (H) 12/19/2017    SED 25 (H) 05/26/2017    NTBNPI 4216 (H) 06/15/2017    NTBNP 1119 (H) 12/19/2017    AST 26 12/19/2017    ALT 45 12/19/2017    ALKPHOS 103 12/19/2017    BILITOTAL 0.8 12/19/2017    INR 1.90 (H) 12/19/2017       Assessment and Plan:   In summary this is a very pleasant 61 M with a history of NICM who is now s/p LVAD on 6/19/2017.  He is slightly hypervolemic on exam today and therefore I am increasing his diuretics as listed below.  I am also concerned about his recurrent bronchitis and abnormal PFTs.       NICM s/p HM II LVAD   Stage D  NYHA Class II  ACEi/ARB yes - Start losartan 25 mg daily  BB yes --Toprol XL 25 mg  Aldosterone antagonist - Aldactone 12.5 mg daily  SCD prophylaxis - ICD present  Fluid status mildly hypervolemic- see below  NSAID use: contraindicated  LDH- stable  MAP: At goal of 60-80    Parox afib: On warfarin    Gout:  Stable     Asthma/COPD: I am recommending that he see pulmonary here. I would like to know their thoughts on his candidacy for cardiac transplant not only in the perioperative period but what his prognosis is after cardiac transplantation. In addition, I am not sure why his requires so many steroid tapers and antibiotics. This gives me concern about overall health of his lungs as we consider his candidacy.  Once he finishes his course of antibiotics it may be worthwhile repeating his PFTs and imaging.      CKD: His creatinine is slightly worse today and we will repeat this next week after  additional diuresis.     Cardiac transplant candidacy:  We discussed  that his ideal goal BMI would be below 35 to be considered for cardiac transplantation.      Instructions given to the patient:  Medications:  1. Increase bumex to 2mg daily for 3 days (Wed, Thur, Fri) only          Delisa Montgomery MD   of Medicine   [facility] Division of Cardiology             CC    Chase Molina MD   [facility]

## 2024-08-12 NOTE — HISTORY OF PRESENT ILLNESS
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 Started on induction chemotherapy, but had severe hematologic side effects.  Completing chemoradiation with weekly Cisplatin on 6/24/24.  Recall, he met with Avila Diaz 7/1/24 and had a near syncopal episode. He was sent to [facility] with dehydration, pancytopenia and weakness and discharged 7/10/24  He again met with Avila Diaz 7/15/24:    Nasal Endoscopy: Posttreatment changes, no obvious lesion in the NPx. Mild mucositis.   Met with Femi Perez 7/22/24 (Gabapentin/Oxycodone) and Alethea Yu (speech)   Interval history:  pet ct  xerostomia, dysgeusia, odynophagia, dysphagia, pain, fatigue or weight loss  care team [place] [facility] Andrew Fischer 7/12/24 PMR Femi Perez/Alethea Yu

## 2024-08-26 ENCOUNTER — APPOINTMENT (OUTPATIENT)
Dept: OTOLARYNGOLOGY | Facility: CLINIC | Age: 59
End: 2024-08-26
Payer: MEDICAID

## 2024-08-26 VITALS
BODY MASS INDEX: 26.66 KG/M2 | HEIGHT: 69 IN | DIASTOLIC BLOOD PRESSURE: 77 MMHG | WEIGHT: 180 LBS | SYSTOLIC BLOOD PRESSURE: 121 MMHG | OXYGEN SATURATION: 97 % | HEART RATE: 103 BPM | RESPIRATION RATE: 17 BRPM

## 2024-08-26 DIAGNOSIS — R13.10 DYSPHAGIA, UNSPECIFIED: ICD-10-CM

## 2024-08-26 DIAGNOSIS — C11.9 MALIGNANT NEOPLASM OF NASOPHARYNX, UNSPECIFIED: ICD-10-CM

## 2024-08-26 DIAGNOSIS — I89.0 LYMPHEDEMA, NOT ELSEWHERE CLASSIFIED: ICD-10-CM

## 2024-08-26 PROCEDURE — 31231 NASAL ENDOSCOPY DX: CPT

## 2024-08-26 PROCEDURE — 99214 OFFICE O/P EST MOD 30 MIN: CPT | Mod: 25

## 2024-08-26 NOTE — HISTORY OF PRESENT ILLNESS
[de-identified] : 59 y/o M presents for follow up for chronic nasopharyngitis Recently completed PET/CT FDG done 02/26/24  History of DVT, on Eliquis  History of Left neck Level II FNA on 1/5/24 which showed metastatic nasopharyngeal carcinoma.  MRI at  on 1/30/24 showed metastatic adenopathy involving jugular chains bilaterally with bulky L palatine tonsil.   Pt smokes 1/2 ppd for 15 years -- reports smoking less that 1/2 ppd today. Former drinker.   Pt finished CXRT on June 24, 2024.  on 7/1/2024 pt was sent to ED for malnutrition, diaphoretic and weak.  And lost 60 lbs. on 8/14/2024 pcp sent to [facility] for neck cellulitis and swelling of neck, tx with IV antib and blood transfusion too. told to f/u infection disease. pt still throat pain and neck swelling, no fever. pt is back on puree due to the pain.

## 2024-08-26 NOTE — PROCEDURE
[None] : none [Flexible Endoscope] : examined with the flexible endoscope [Serial Number: ___] : Serial Number: [unfilled] [FreeTextEntry6] : Posttreatment changes, no obvious lesion in the NPx.  [de-identified] : NPC

## 2024-08-26 NOTE — PROCEDURE
[None] : none [Flexible Endoscope] : examined with the flexible endoscope [Serial Number: ___] : Serial Number: [unfilled] [FreeTextEntry6] : Posttreatment changes, no obvious lesion in the NPx.  [de-identified] : NPC

## 2024-08-26 NOTE — PHYSICAL EXAM
[Nasal Endoscopy Performed] : nasal endoscopy was performed, see procedure section for findings [Normal] : no rashes [de-identified] : Posttreatment changes, no LAD.  Significant lymphedema - no signs of cellulitis. [FreeTextEntry1] : No concerning lesions in the OC/OPx on inspection or palpation.

## 2024-08-26 NOTE — PHYSICAL EXAM
[Nasal Endoscopy Performed] : nasal endoscopy was performed, see procedure section for findings [Normal] : no rashes [de-identified] : Posttreatment changes, no LAD.  Significant lymphedema - no signs of cellulitis. [FreeTextEntry1] : No concerning lesions in the OC/OPx on inspection or palpation.

## 2024-08-26 NOTE — HISTORY OF PRESENT ILLNESS
[de-identified] : 57 y/o M presents for follow up for chronic nasopharyngitis Recently completed PET/CT FDG done 02/26/24  History of DVT, on Eliquis  History of Left neck Level II FNA on 1/5/24 which showed metastatic nasopharyngeal carcinoma.  MRI at  on 1/30/24 showed metastatic adenopathy involving jugular chains bilaterally with bulky L palatine tonsil.   Pt smokes 1/2 ppd for 15 years -- reports smoking less that 1/2 ppd today. Former drinker.   Pt finished CXRT on June 24, 2024.  on 7/1/2024 pt was sent to ED for malnutrition, diaphoretic and weak.  And lost 60 lbs. on 8/14/2024 pcp sent to [facility] for neck cellulitis and swelling of neck, tx with IV antib and blood transfusion too. told to f/u infection disease. pt still throat pain and neck swelling, no fever. pt is back on puree due to the pain.

## 2024-09-03 ENCOUNTER — APPOINTMENT (OUTPATIENT)
Dept: OTOLARYNGOLOGY | Facility: CLINIC | Age: 59
End: 2024-09-03
Payer: MEDICAID

## 2024-09-03 PROCEDURE — 92526 ORAL FUNCTION THERAPY: CPT | Mod: GN

## 2024-09-04 ENCOUNTER — OUTPATIENT (OUTPATIENT)
Dept: OUTPATIENT SERVICES | Facility: HOSPITAL | Age: 59
LOS: 1 days | Discharge: HOME | End: 2024-09-04

## 2024-09-04 DIAGNOSIS — C11.9 MALIGNANT NEOPLASM OF NASOPHARYNX, UNSPECIFIED: ICD-10-CM

## 2024-09-06 ENCOUNTER — APPOINTMENT (OUTPATIENT)
Dept: INFECTIOUS DISEASE | Facility: CLINIC | Age: 59
End: 2024-09-06

## 2024-09-09 ENCOUNTER — APPOINTMENT (OUTPATIENT)
Dept: PHYSICAL MEDICINE AND REHAB | Facility: CLINIC | Age: 59
End: 2024-09-09
Payer: MEDICAID

## 2024-09-09 VITALS
SYSTOLIC BLOOD PRESSURE: 117 MMHG | RESPIRATION RATE: 18 BRPM | DIASTOLIC BLOOD PRESSURE: 72 MMHG | HEIGHT: 69 IN | WEIGHT: 174 LBS | HEART RATE: 61 BPM | BODY MASS INDEX: 25.77 KG/M2

## 2024-09-09 DIAGNOSIS — R13.10 DYSPHAGIA, UNSPECIFIED: ICD-10-CM

## 2024-09-09 DIAGNOSIS — M79.2 NEURALGIA AND NEURITIS, UNSPECIFIED: ICD-10-CM

## 2024-09-09 DIAGNOSIS — I89.0 LYMPHEDEMA, NOT ELSEWHERE CLASSIFIED: ICD-10-CM

## 2024-09-09 PROCEDURE — 99214 OFFICE O/P EST MOD 30 MIN: CPT

## 2024-09-09 NOTE — ASSESSMENT
[FreeTextEntry1] : 58 year old male presenting for evaluation.    #Chronic pain after cancer treatment: -Adjust gabapentin to 300mg nightly-rx sent  #Dysphagia: -Continue SLP  #Lymphedema: -Obtain compression, start MLD after CBC, pending improvement in thrombocytopenia    Follow up in 3-4 weeks.

## 2024-09-09 NOTE — HISTORY OF PRESENT ILLNESS
[FreeTextEntry1] : Mr. Jenkins is a 58 year old male with NPC with bilateral metastatic LAD which is confirmed with PET/CT - cT1 N2 M0.  He is s/p CXRT completed June 24, 2024.   He reports only having pain at night.  Still feels burning.  Reports he has had worsening edema in his cervical region.  Having some difficulty with swallowing.  Still seeing SLP.

## 2024-09-10 ENCOUNTER — APPOINTMENT (OUTPATIENT)
Dept: OTOLARYNGOLOGY | Facility: CLINIC | Age: 59
End: 2024-09-10
Payer: MEDICAID

## 2024-09-10 ENCOUNTER — NON-APPOINTMENT (OUTPATIENT)
Age: 59
End: 2024-09-10

## 2024-09-10 PROCEDURE — 92526 ORAL FUNCTION THERAPY: CPT | Mod: GN

## 2024-09-12 ENCOUNTER — RESULT REVIEW (OUTPATIENT)
Age: 59
End: 2024-09-12

## 2024-09-12 ENCOUNTER — APPOINTMENT (OUTPATIENT)
Dept: HEMATOLOGY ONCOLOGY | Facility: CLINIC | Age: 59
End: 2024-09-12
Payer: MEDICAID

## 2024-09-12 VITALS
BODY MASS INDEX: 26.3 KG/M2 | HEIGHT: 69 IN | WEIGHT: 177.58 LBS | HEART RATE: 177 BPM | DIASTOLIC BLOOD PRESSURE: 71 MMHG | SYSTOLIC BLOOD PRESSURE: 112 MMHG | TEMPERATURE: 97.6 F | OXYGEN SATURATION: 100 %

## 2024-09-12 LAB
BASOPHILS # BLD AUTO: 0.1 K/UL — SIGNIFICANT CHANGE UP (ref 0–0.2)
BASOPHILS NFR BLD AUTO: 1.1 % — SIGNIFICANT CHANGE UP (ref 0–2)
EOSINOPHIL # BLD AUTO: 0 K/UL — SIGNIFICANT CHANGE UP (ref 0–0.5)
EOSINOPHIL NFR BLD AUTO: 0.6 % — SIGNIFICANT CHANGE UP (ref 0–6)
HCT VFR BLD CALC: 26.8 % — LOW (ref 39–50)
HGB BLD-MCNC: 8.7 G/DL — LOW (ref 13–17)
LYMPHOCYTES # BLD AUTO: 0.4 K/UL — LOW (ref 1–3.3)
LYMPHOCYTES # BLD AUTO: 8.7 % — LOW (ref 13–44)
MCHC RBC-ENTMCNC: 32.4 PG — SIGNIFICANT CHANGE UP (ref 27–34)
MCHC RBC-ENTMCNC: 32.5 G/DL — SIGNIFICANT CHANGE UP (ref 32–36)
MCV RBC AUTO: 99.8 FL — SIGNIFICANT CHANGE UP (ref 80–100)
MONOCYTES # BLD AUTO: 0.4 K/UL — SIGNIFICANT CHANGE UP (ref 0–0.9)
MONOCYTES NFR BLD AUTO: 7.9 % — SIGNIFICANT CHANGE UP (ref 2–14)
NEUTROPHILS # BLD AUTO: 4.1 K/UL — SIGNIFICANT CHANGE UP (ref 1.8–7.4)
NEUTROPHILS NFR BLD AUTO: 81.7 % — HIGH (ref 43–77)
PLATELET # BLD AUTO: 62 K/UL — LOW (ref 150–400)
RBC # BLD: 2.68 M/UL — LOW (ref 4.2–5.8)
RBC # FLD: 13.2 % — SIGNIFICANT CHANGE UP (ref 10.3–14.5)
WBC # BLD: 5 K/UL — SIGNIFICANT CHANGE UP (ref 3.8–10.5)
WBC # FLD AUTO: 5 K/UL — SIGNIFICANT CHANGE UP (ref 3.8–10.5)

## 2024-09-12 PROCEDURE — 99214 OFFICE O/P EST MOD 30 MIN: CPT

## 2024-09-12 RX ORDER — DIPHENOXYLATE HYDROCHLORIDE AND ATROPINE SULFATE 2.5; .025 MG/1; MG/1
2.5-0.025 TABLET ORAL
Qty: 120 | Refills: 0 | Status: ACTIVE | COMMUNITY
Start: 2024-09-12 | End: 1900-01-01

## 2024-09-12 NOTE — HISTORY OF PRESENT ILLNESS
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  He sees ENT, Dr. Ibanez for chronic nasopharyngitis. CT on 12/21/23 at  showed soft tissue thickening in nasopharynx and cervical lymph nodes. Pt had a left neck level II FNA on 1/5/24 which showed metastatic nasopharyngeal carcinoma.  MRI at  on 1/30/24 showed metastatic adenopathy involving jugular chains bilaterally with bulky L palatine tonsil.  He saw Dr. Avila Diaz on 2/12/24 and is scheduled to see Dr. Anisha roberts week.  He is scheduled for PET scan on 02/26/24.  He feels ok and is eating a normal diet currently.   [de-identified] : Patient presents on C7D1 CRT with weekly Cisplatin for metastatic nasopharyngeal carcinoma.  He is s/p just one cycle of induction chemotherapy with gemcitabine and cisplatin which was discontinued for significant hematologic toxicity, thrombocytopenia to 22 requiring PLT transfusion as well as neutropenia with a WBC of 0.8. + Increased odynophagia, now severe. + Erythema right neck. + Decreased appetite and dysgeusia.  + Weight loss, ~15-20 lbs since the start of treatment. + Fatigue, worse. + Xerostomia, worse.  Intermittent LUE pain/neuropathy, seems like cervical radiculopathy, currently resolved. Thick oral/nasal secretions resolved. Tinnitus, R ear only, resolved. No N/V. No fevers.  07/12/24: Mr Jenkins returns for follow up after recent hospitalization. Finished CXRT on June 24, 2024. He was hospitalized for FTT after being seen by Dr. Diaz in the office. Since discharge, feeling better, pain improving, tolerating PO.  He will see Dr. Diaz next week, will defer to him with regard to ordering imaging to assess response to treatment.  Follow up in 2 months to review imaging   09/12/24: Mr Jenkins returns for follow up. He saw Dr Diaz on 08/26/24 and he noted good response to treatment, but also sent him to the hospital for FTT.  He is now feeling a bit better and is able to tolerate eggs and some soft foods. Still experiencing some diarrhea, though improved. PET scan will be at the end of the month and he will follow up to discsuss the results.

## 2024-09-12 NOTE — PHYSICAL EXAM
[Fully active, able to carry on all pre-disease performance without restriction] : Status 0 - Fully active, able to carry on all pre-disease performance without restriction [Normal] : affect appropriate [de-identified] : cervical lymphadenopathy

## 2024-09-12 NOTE — ASSESSMENT
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  It is EBV positive on path report.  -Dr Fischer discussed treatment of localized nasopharyngeal carcinoma with induction chemo therapy followed by chemoRT.  -PET from 02/26/24 shows FDG-avid soft tissue thickening in nasopharynx corresponds to known malignancy. Mild, symmetric hypermetabolism in the palatine tonsils is nonspecific. Further evaluation may be performed with direct visualization.  FDG-avid bilateral level II, left level III, and left retropharyngeal lymph node metastases.  FDG-avid consolidation at left lung base is indeterminate. Differential diagnosis includes infectious, inflammatory, and neoplastic etiologies. Increased FDG uptake within the iliopsoas anterior to the right femoral head and adjacent to the gluteus medius insertion on the right greater trochanter, likely inflammatory. -Given significant bilateral mari metastases, initial plan was for 3 cycles of induction chemotherapy with gemcitabine and cisplatin followed by chemoRT with cisplatin. -unfortunately he had significant hematologic toxicity associated with cycle 1 with thrombocytopenia to 22 requiring PLT transfusion as well as neutropenia with a WBC of 0.8.   -Given the significant hematologic toxicity, Dr. Fischer recommended that we transition to chemoRT as continued induction may hinder his definitive treatment w CRT.   -received neulasta x 1 and started CRT with weekly Cisplatin on 5/6/24 -today is C7D1, 7 of 7 07/12/24: Mr Jenkins returns for follow up after recent hospitalization. Finished CXRT on June 24, 2024. He was hospitalized for FTT after being seen by Dr. Diaz in the office. Since discharge, feeling better, pain improving, tolerating PO.  WBC improving, hgb stable.   He will see Dr. Diaz next week, will defer to him with regard to ordering imaging to assess response to treatment.  Follow up in 2 months to review imaging   09/12/24: Mr Jenkins returns for follow up. He saw Dr Diaz on 08/26/24 and he noted good response to treatment, but also sent him to the hospital for FTT.  He is now feeling a bit better and is able to tolerate eggs and some soft foods. Still experiencing some diarrhea, though improved. PET scan will be at the end of the month and he will follow up to discsuss the results.

## 2024-09-13 LAB
ALBUMIN SERPL ELPH-MCNC: 3.8 G/DL
ALP BLD-CCNC: 69 U/L
ALT SERPL-CCNC: 8 U/L
ANION GAP SERPL CALC-SCNC: 10 MMOL/L
AST SERPL-CCNC: 13 U/L
BILIRUB SERPL-MCNC: 0.4 MG/DL
BUN SERPL-MCNC: 26 MG/DL
CALCIUM SERPL-MCNC: 9 MG/DL
CHLORIDE SERPL-SCNC: 94 MMOL/L
CO2 SERPL-SCNC: 29 MMOL/L
CREAT SERPL-MCNC: 0.88 MG/DL
EGFR: 100 ML/MIN/1.73M2
GLUCOSE SERPL-MCNC: 108 MG/DL
MAGNESIUM SERPL-MCNC: 1.4 MG/DL
POTASSIUM SERPL-SCNC: 5.2 MMOL/L
PROT SERPL-MCNC: 7.8 G/DL
SODIUM SERPL-SCNC: 132 MMOL/L
T4 SERPL-MCNC: 10.4 UG/DL
TSH SERPL-ACNC: 1.12 UIU/ML

## 2024-09-18 ENCOUNTER — APPOINTMENT (OUTPATIENT)
Dept: OTOLARYNGOLOGY | Facility: CLINIC | Age: 59
End: 2024-09-18
Payer: MEDICAID

## 2024-09-18 PROCEDURE — 92526 ORAL FUNCTION THERAPY: CPT | Mod: GN

## 2024-09-24 ENCOUNTER — APPOINTMENT (OUTPATIENT)
Dept: OTOLARYNGOLOGY | Facility: CLINIC | Age: 59
End: 2024-09-24
Payer: MEDICAID

## 2024-09-24 PROCEDURE — 92526 ORAL FUNCTION THERAPY: CPT | Mod: GN

## 2024-09-27 ENCOUNTER — APPOINTMENT (OUTPATIENT)
Dept: CT IMAGING | Facility: CLINIC | Age: 59
End: 2024-09-27

## 2024-09-27 ENCOUNTER — APPOINTMENT (OUTPATIENT)
Dept: NUCLEAR MEDICINE | Facility: CLINIC | Age: 59
End: 2024-09-27

## 2024-09-30 ENCOUNTER — APPOINTMENT (OUTPATIENT)
Dept: NUCLEAR MEDICINE | Facility: CLINIC | Age: 59
End: 2024-09-30
Payer: MEDICAID

## 2024-09-30 ENCOUNTER — OUTPATIENT (OUTPATIENT)
Dept: OUTPATIENT SERVICES | Facility: HOSPITAL | Age: 59
LOS: 1 days | End: 2024-09-30

## 2024-09-30 ENCOUNTER — APPOINTMENT (OUTPATIENT)
Dept: CT IMAGING | Facility: CLINIC | Age: 59
End: 2024-09-30
Payer: MEDICAID

## 2024-09-30 ENCOUNTER — RESULT REVIEW (OUTPATIENT)
Age: 59
End: 2024-09-30

## 2024-09-30 DIAGNOSIS — C11.9 MALIGNANT NEOPLASM OF NASOPHARYNX, UNSPECIFIED: ICD-10-CM

## 2024-09-30 PROCEDURE — 70491 CT SOFT TISSUE NECK W/DYE: CPT | Mod: 26

## 2024-09-30 PROCEDURE — 78815 PET IMAGE W/CT SKULL-THIGH: CPT | Mod: 26,PS

## 2024-10-04 ENCOUNTER — NON-APPOINTMENT (OUTPATIENT)
Age: 59
End: 2024-10-04

## 2024-10-07 ENCOUNTER — APPOINTMENT (OUTPATIENT)
Dept: OTOLARYNGOLOGY | Facility: CLINIC | Age: 59
End: 2024-10-07
Payer: MEDICAID

## 2024-10-07 VITALS
WEIGHT: 177 LBS | HEIGHT: 69 IN | DIASTOLIC BLOOD PRESSURE: 76 MMHG | SYSTOLIC BLOOD PRESSURE: 115 MMHG | BODY MASS INDEX: 26.22 KG/M2

## 2024-10-07 DIAGNOSIS — E46 UNSPECIFIED PROTEIN-CALORIE MALNUTRITION: ICD-10-CM

## 2024-10-07 PROCEDURE — 31231 NASAL ENDOSCOPY DX: CPT

## 2024-10-07 PROCEDURE — 99214 OFFICE O/P EST MOD 30 MIN: CPT | Mod: 25

## 2024-10-08 ENCOUNTER — APPOINTMENT (OUTPATIENT)
Dept: OTOLARYNGOLOGY | Facility: CLINIC | Age: 59
End: 2024-10-08
Payer: MEDICAID

## 2024-10-08 ENCOUNTER — APPOINTMENT (OUTPATIENT)
Dept: HEMATOLOGY ONCOLOGY | Facility: CLINIC | Age: 59
End: 2024-10-08
Payer: MEDICAID

## 2024-10-08 ENCOUNTER — NON-APPOINTMENT (OUTPATIENT)
Age: 59
End: 2024-10-08

## 2024-10-08 ENCOUNTER — APPOINTMENT (OUTPATIENT)
Dept: RADIATION ONCOLOGY | Facility: CLINIC | Age: 59
End: 2024-10-08
Payer: MEDICAID

## 2024-10-08 VITALS
HEART RATE: 91 BPM | HEIGHT: 69 IN | BODY MASS INDEX: 25.84 KG/M2 | OXYGEN SATURATION: 99 % | TEMPERATURE: 97.8 F | WEIGHT: 174.49 LBS | SYSTOLIC BLOOD PRESSURE: 107 MMHG | DIASTOLIC BLOOD PRESSURE: 76 MMHG

## 2024-10-08 DIAGNOSIS — C11.9 MALIGNANT NEOPLASM OF NASOPHARYNX, UNSPECIFIED: ICD-10-CM

## 2024-10-08 PROCEDURE — 99214 OFFICE O/P EST MOD 30 MIN: CPT

## 2024-10-08 PROCEDURE — G2211 COMPLEX E/M VISIT ADD ON: CPT | Mod: NC

## 2024-10-08 PROCEDURE — 92526 ORAL FUNCTION THERAPY: CPT | Mod: GN

## 2024-10-08 PROCEDURE — 99213 OFFICE O/P EST LOW 20 MIN: CPT

## 2024-10-08 RX ORDER — OXYCODONE HYDROCHLORIDE 5 MG/1
5 CAPSULE ORAL EVERY 8 HOURS
Qty: 15 | Refills: 0 | Status: ACTIVE | COMMUNITY
Start: 2024-10-08 | End: 1900-01-01

## 2024-10-11 ENCOUNTER — NON-APPOINTMENT (OUTPATIENT)
Age: 59
End: 2024-10-11

## 2024-10-14 ENCOUNTER — APPOINTMENT (OUTPATIENT)
Dept: PHYSICAL MEDICINE AND REHAB | Facility: CLINIC | Age: 59
End: 2024-10-14
Payer: MEDICAID

## 2024-10-14 VITALS — BODY MASS INDEX: 25.48 KG/M2 | WEIGHT: 172 LBS | HEIGHT: 69 IN

## 2024-10-14 DIAGNOSIS — I89.0 LYMPHEDEMA, NOT ELSEWHERE CLASSIFIED: ICD-10-CM

## 2024-10-14 DIAGNOSIS — R13.10 DYSPHAGIA, UNSPECIFIED: ICD-10-CM

## 2024-10-14 DIAGNOSIS — M79.2 NEURALGIA AND NEURITIS, UNSPECIFIED: ICD-10-CM

## 2024-10-14 PROCEDURE — 99214 OFFICE O/P EST MOD 30 MIN: CPT

## 2024-10-14 RX ORDER — GABAPENTIN 300 MG/1
300 CAPSULE ORAL
Qty: 120 | Refills: 1 | Status: ACTIVE | COMMUNITY
Start: 2024-10-14 | End: 1900-01-01

## 2024-10-15 ENCOUNTER — APPOINTMENT (OUTPATIENT)
Dept: OTOLARYNGOLOGY | Facility: CLINIC | Age: 59
End: 2024-10-15
Payer: MEDICAID

## 2024-10-15 PROCEDURE — 92526 ORAL FUNCTION THERAPY: CPT | Mod: GN

## 2024-10-16 ENCOUNTER — NON-APPOINTMENT (OUTPATIENT)
Age: 59
End: 2024-10-16

## 2024-10-22 ENCOUNTER — APPOINTMENT (OUTPATIENT)
Dept: OTOLARYNGOLOGY | Facility: CLINIC | Age: 59
End: 2024-10-22
Payer: MEDICAID

## 2024-10-22 PROCEDURE — 92526 ORAL FUNCTION THERAPY: CPT | Mod: GN

## 2024-10-29 ENCOUNTER — APPOINTMENT (OUTPATIENT)
Dept: OTOLARYNGOLOGY | Facility: CLINIC | Age: 59
End: 2024-10-29
Payer: MEDICAID

## 2024-10-29 PROCEDURE — 92526 ORAL FUNCTION THERAPY: CPT | Mod: GN

## 2024-11-05 ENCOUNTER — APPOINTMENT (OUTPATIENT)
Dept: OTOLARYNGOLOGY | Facility: CLINIC | Age: 59
End: 2024-11-05
Payer: MEDICAID

## 2024-11-05 ENCOUNTER — NON-APPOINTMENT (OUTPATIENT)
Age: 59
End: 2024-11-05

## 2024-11-05 PROCEDURE — 92526 ORAL FUNCTION THERAPY: CPT | Mod: GN

## 2024-11-07 ENCOUNTER — RESULT REVIEW (OUTPATIENT)
Age: 59
End: 2024-11-07

## 2024-11-07 ENCOUNTER — APPOINTMENT (OUTPATIENT)
Dept: HEMATOLOGY ONCOLOGY | Facility: CLINIC | Age: 59
End: 2024-11-07
Payer: MEDICAID

## 2024-11-07 VITALS
HEIGHT: 69 IN | HEART RATE: 102 BPM | BODY MASS INDEX: 26.35 KG/M2 | OXYGEN SATURATION: 99 % | WEIGHT: 177.91 LBS | SYSTOLIC BLOOD PRESSURE: 111 MMHG | DIASTOLIC BLOOD PRESSURE: 77 MMHG

## 2024-11-07 DIAGNOSIS — C11.9 MALIGNANT NEOPLASM OF NASOPHARYNX, UNSPECIFIED: ICD-10-CM

## 2024-11-07 LAB
ALBUMIN SERPL ELPH-MCNC: 4 G/DL
ALP BLD-CCNC: 83 U/L
ALT SERPL-CCNC: 14 U/L
ANION GAP SERPL CALC-SCNC: 10 MMOL/L
AST SERPL-CCNC: 22 U/L
BASOPHILS # BLD AUTO: 0 K/UL — SIGNIFICANT CHANGE UP (ref 0–0.2)
BASOPHILS NFR BLD AUTO: 0.2 % — SIGNIFICANT CHANGE UP (ref 0–2)
BILIRUB SERPL-MCNC: 0.2 MG/DL
BUN SERPL-MCNC: 25 MG/DL
CALCIUM SERPL-MCNC: 9.7 MG/DL
CHLORIDE SERPL-SCNC: 99 MMOL/L
CO2 SERPL-SCNC: 31 MMOL/L
CREAT SERPL-MCNC: 0.79 MG/DL
EGFR: 104 ML/MIN/1.73M2
EOSINOPHIL # BLD AUTO: 0 K/UL — SIGNIFICANT CHANGE UP (ref 0–0.5)
EOSINOPHIL NFR BLD AUTO: 1.2 % — SIGNIFICANT CHANGE UP (ref 0–6)
GLUCOSE SERPL-MCNC: 148 MG/DL
HCT VFR BLD CALC: 31.5 % — LOW (ref 39–50)
HGB BLD-MCNC: 9.8 G/DL — LOW (ref 13–17)
LYMPHOCYTES # BLD AUTO: 0.4 K/UL — LOW (ref 1–3.3)
LYMPHOCYTES # BLD AUTO: 14.4 % — SIGNIFICANT CHANGE UP (ref 13–44)
MAGNESIUM SERPL-MCNC: 1.7 MG/DL
MCHC RBC-ENTMCNC: 31.2 G/DL — LOW (ref 32–36)
MCHC RBC-ENTMCNC: 32.7 PG — SIGNIFICANT CHANGE UP (ref 27–34)
MCV RBC AUTO: 104.8 FL — HIGH (ref 80–100)
MONOCYTES # BLD AUTO: 0.3 K/UL — SIGNIFICANT CHANGE UP (ref 0–0.9)
MONOCYTES NFR BLD AUTO: 8.4 % — SIGNIFICANT CHANGE UP (ref 2–14)
NEUTROPHILS # BLD AUTO: 2.3 K/UL — SIGNIFICANT CHANGE UP (ref 1.8–7.4)
NEUTROPHILS NFR BLD AUTO: 75.8 % — SIGNIFICANT CHANGE UP (ref 43–77)
PLATELET # BLD AUTO: 109 K/UL — LOW (ref 150–400)
POTASSIUM SERPL-SCNC: 4.7 MMOL/L
PROT SERPL-MCNC: 8.1 G/DL
RBC # BLD: 3 M/UL — LOW (ref 4.2–5.8)
RBC # FLD: 14.7 % — HIGH (ref 10.3–14.5)
SODIUM SERPL-SCNC: 140 MMOL/L
WBC # BLD: 3 K/UL — LOW (ref 3.8–10.5)
WBC # FLD AUTO: 3 K/UL — LOW (ref 3.8–10.5)

## 2024-11-07 PROCEDURE — 99214 OFFICE O/P EST MOD 30 MIN: CPT

## 2024-11-12 ENCOUNTER — APPOINTMENT (OUTPATIENT)
Dept: OTOLARYNGOLOGY | Facility: CLINIC | Age: 59
End: 2024-11-12

## 2024-11-19 ENCOUNTER — APPOINTMENT (OUTPATIENT)
Dept: OTOLARYNGOLOGY | Facility: CLINIC | Age: 59
End: 2024-11-19
Payer: MEDICAID

## 2024-11-19 PROCEDURE — 92526 ORAL FUNCTION THERAPY: CPT | Mod: GN

## 2024-11-25 ENCOUNTER — APPOINTMENT (OUTPATIENT)
Dept: PHYSICAL MEDICINE AND REHAB | Facility: CLINIC | Age: 59
End: 2024-11-25
Payer: MEDICAID

## 2024-11-25 VITALS
RESPIRATION RATE: 14 BRPM | HEIGHT: 69 IN | BODY MASS INDEX: 26.22 KG/M2 | WEIGHT: 177 LBS | DIASTOLIC BLOOD PRESSURE: 80 MMHG | HEART RATE: 86 BPM | SYSTOLIC BLOOD PRESSURE: 129 MMHG

## 2024-11-25 DIAGNOSIS — M79.2 NEURALGIA AND NEURITIS, UNSPECIFIED: ICD-10-CM

## 2024-11-25 DIAGNOSIS — K11.7 DISTURBANCES OF SALIVARY SECRETION: ICD-10-CM

## 2024-11-25 DIAGNOSIS — I89.0 LYMPHEDEMA, NOT ELSEWHERE CLASSIFIED: ICD-10-CM

## 2024-11-25 DIAGNOSIS — R13.10 DYSPHAGIA, UNSPECIFIED: ICD-10-CM

## 2024-11-25 PROCEDURE — 99214 OFFICE O/P EST MOD 30 MIN: CPT

## 2024-11-25 RX ORDER — PILOCARPINE HYDROCHLORIDE 5 MG/1
5 TABLET, FILM COATED ORAL
Qty: 30 | Refills: 2 | Status: ACTIVE | COMMUNITY
Start: 2024-11-25 | End: 1900-01-01

## 2024-11-26 ENCOUNTER — APPOINTMENT (OUTPATIENT)
Dept: OTOLARYNGOLOGY | Facility: CLINIC | Age: 59
End: 2024-11-26
Payer: MEDICAID

## 2024-11-26 PROCEDURE — 92526 ORAL FUNCTION THERAPY: CPT | Mod: GN

## 2024-12-03 ENCOUNTER — APPOINTMENT (OUTPATIENT)
Dept: OTOLARYNGOLOGY | Facility: CLINIC | Age: 59
End: 2024-12-03
Payer: MEDICAID

## 2024-12-03 PROCEDURE — 92526 ORAL FUNCTION THERAPY: CPT | Mod: GN

## 2024-12-11 ENCOUNTER — APPOINTMENT (OUTPATIENT)
Dept: OTOLARYNGOLOGY | Facility: CLINIC | Age: 59
End: 2024-12-11
Payer: MEDICAID

## 2024-12-11 PROCEDURE — 92526 ORAL FUNCTION THERAPY: CPT | Mod: GN

## 2024-12-17 ENCOUNTER — APPOINTMENT (OUTPATIENT)
Dept: OTOLARYNGOLOGY | Facility: CLINIC | Age: 59
End: 2024-12-17
Payer: MEDICAID

## 2024-12-17 PROCEDURE — 92526 ORAL FUNCTION THERAPY: CPT | Mod: GN

## 2024-12-24 ENCOUNTER — APPOINTMENT (OUTPATIENT)
Dept: OTOLARYNGOLOGY | Facility: CLINIC | Age: 59
End: 2024-12-24

## 2024-12-30 ENCOUNTER — APPOINTMENT (OUTPATIENT)
Dept: PHYSICAL MEDICINE AND REHAB | Facility: CLINIC | Age: 59
End: 2024-12-30
Payer: MEDICAID

## 2024-12-30 VITALS
RESPIRATION RATE: 14 BRPM | WEIGHT: 174 LBS | SYSTOLIC BLOOD PRESSURE: 128 MMHG | HEIGHT: 69 IN | HEART RATE: 97 BPM | BODY MASS INDEX: 25.77 KG/M2 | DIASTOLIC BLOOD PRESSURE: 80 MMHG

## 2024-12-30 DIAGNOSIS — K11.7 DISTURBANCES OF SALIVARY SECRETION: ICD-10-CM

## 2024-12-30 DIAGNOSIS — M79.2 NEURALGIA AND NEURITIS, UNSPECIFIED: ICD-10-CM

## 2024-12-30 DIAGNOSIS — R13.10 DYSPHAGIA, UNSPECIFIED: ICD-10-CM

## 2024-12-30 DIAGNOSIS — R63.0 ANOREXIA: ICD-10-CM

## 2024-12-30 DIAGNOSIS — I89.0 LYMPHEDEMA, NOT ELSEWHERE CLASSIFIED: ICD-10-CM

## 2024-12-30 PROCEDURE — 99214 OFFICE O/P EST MOD 30 MIN: CPT

## 2024-12-30 RX ORDER — MIRTAZAPINE 15 MG/1
15 TABLET, FILM COATED ORAL
Qty: 30 | Refills: 1 | Status: ACTIVE | COMMUNITY
Start: 2024-12-30 | End: 1900-01-01

## 2024-12-31 ENCOUNTER — APPOINTMENT (OUTPATIENT)
Dept: OTOLARYNGOLOGY | Facility: CLINIC | Age: 59
End: 2024-12-31
Payer: MEDICAID

## 2024-12-31 PROCEDURE — 92526 ORAL FUNCTION THERAPY: CPT | Mod: GN

## 2025-01-07 ENCOUNTER — APPOINTMENT (OUTPATIENT)
Dept: OTOLARYNGOLOGY | Facility: CLINIC | Age: 60
End: 2025-01-07

## 2025-01-09 ENCOUNTER — OUTPATIENT (OUTPATIENT)
Dept: OUTPATIENT SERVICES | Facility: HOSPITAL | Age: 60
LOS: 1 days | Discharge: HOME | End: 2025-01-09

## 2025-01-09 DIAGNOSIS — C11.9 MALIGNANT NEOPLASM OF NASOPHARYNX, UNSPECIFIED: ICD-10-CM

## 2025-01-14 ENCOUNTER — RESULT REVIEW (OUTPATIENT)
Age: 60
End: 2025-01-14

## 2025-01-14 ENCOUNTER — APPOINTMENT (OUTPATIENT)
Dept: OTOLARYNGOLOGY | Facility: CLINIC | Age: 60
End: 2025-01-14
Payer: MEDICAID

## 2025-01-14 ENCOUNTER — APPOINTMENT (OUTPATIENT)
Dept: HEMATOLOGY ONCOLOGY | Facility: CLINIC | Age: 60
End: 2025-01-14
Payer: MEDICAID

## 2025-01-14 ENCOUNTER — NON-APPOINTMENT (OUTPATIENT)
Age: 60
End: 2025-01-14

## 2025-01-14 VITALS
WEIGHT: 171.3 LBS | HEART RATE: 98 BPM | HEIGHT: 69 IN | DIASTOLIC BLOOD PRESSURE: 69 MMHG | OXYGEN SATURATION: 99 % | TEMPERATURE: 97.8 F | BODY MASS INDEX: 25.37 KG/M2 | SYSTOLIC BLOOD PRESSURE: 100 MMHG

## 2025-01-14 LAB
ALBUMIN SERPL ELPH-MCNC: 4 G/DL
ALP BLD-CCNC: 99 U/L
ALT SERPL-CCNC: 13 U/L
ANION GAP SERPL CALC-SCNC: 11 MMOL/L
AST SERPL-CCNC: 18 U/L
BASOPHILS # BLD AUTO: 0 K/UL — SIGNIFICANT CHANGE UP (ref 0–0.2)
BASOPHILS NFR BLD AUTO: 0.6 % — SIGNIFICANT CHANGE UP (ref 0–2)
BILIRUB SERPL-MCNC: 0.3 MG/DL
BUN SERPL-MCNC: 22 MG/DL
CALCIUM SERPL-MCNC: 9.7 MG/DL
CHLORIDE SERPL-SCNC: 95 MMOL/L
CO2 SERPL-SCNC: 30 MMOL/L
CREAT SERPL-MCNC: 0.85 MG/DL
EGFR: 101 ML/MIN/1.73M2
EOSINOPHIL # BLD AUTO: 0.1 K/UL — SIGNIFICANT CHANGE UP (ref 0–0.5)
EOSINOPHIL NFR BLD AUTO: 2.6 % — SIGNIFICANT CHANGE UP (ref 0–6)
GLUCOSE SERPL-MCNC: 138 MG/DL
HCT VFR BLD CALC: 33 % — LOW (ref 39–50)
HGB BLD-MCNC: 10.8 G/DL — LOW (ref 13–17)
LYMPHOCYTES # BLD AUTO: 0.4 K/UL — LOW (ref 1–3.3)
LYMPHOCYTES # BLD AUTO: 9.9 % — LOW (ref 13–44)
MAGNESIUM SERPL-MCNC: 1.7 MG/DL
MCHC RBC-ENTMCNC: 32.2 PG — SIGNIFICANT CHANGE UP (ref 27–34)
MCHC RBC-ENTMCNC: 32.9 G/DL — SIGNIFICANT CHANGE UP (ref 32–36)
MCV RBC AUTO: 97.9 FL — SIGNIFICANT CHANGE UP (ref 80–100)
MONOCYTES # BLD AUTO: 0.3 K/UL — SIGNIFICANT CHANGE UP (ref 0–0.9)
MONOCYTES NFR BLD AUTO: 7.7 % — SIGNIFICANT CHANGE UP (ref 2–14)
NEUTROPHILS # BLD AUTO: 3.4 K/UL — SIGNIFICANT CHANGE UP (ref 1.8–7.4)
NEUTROPHILS NFR BLD AUTO: 79.1 % — HIGH (ref 43–77)
PLATELET # BLD AUTO: 166 K/UL — SIGNIFICANT CHANGE UP (ref 150–400)
POTASSIUM SERPL-SCNC: 4.5 MMOL/L
PROT SERPL-MCNC: 8.7 G/DL
RBC # BLD: 3.37 M/UL — LOW (ref 4.2–5.8)
RBC # FLD: 11.3 % — SIGNIFICANT CHANGE UP (ref 10.3–14.5)
SODIUM SERPL-SCNC: 136 MMOL/L
T4 SERPL-MCNC: 9.2 UG/DL
TSH SERPL-ACNC: 2.78 UIU/ML
WBC # BLD: 4.2 K/UL — SIGNIFICANT CHANGE UP (ref 3.8–10.5)
WBC # FLD AUTO: 4.2 K/UL — SIGNIFICANT CHANGE UP (ref 3.8–10.5)

## 2025-01-14 PROCEDURE — 92526 ORAL FUNCTION THERAPY: CPT | Mod: GN

## 2025-01-14 PROCEDURE — 99214 OFFICE O/P EST MOD 30 MIN: CPT

## 2025-01-17 ENCOUNTER — APPOINTMENT (OUTPATIENT)
Dept: RADIATION ONCOLOGY | Facility: CLINIC | Age: 60
End: 2025-01-17
Payer: MEDICAID

## 2025-01-17 ENCOUNTER — NON-APPOINTMENT (OUTPATIENT)
Age: 60
End: 2025-01-17

## 2025-01-17 VITALS
HEART RATE: 138 BPM | RESPIRATION RATE: 16 BRPM | SYSTOLIC BLOOD PRESSURE: 118 MMHG | OXYGEN SATURATION: 99 % | DIASTOLIC BLOOD PRESSURE: 78 MMHG | BODY MASS INDEX: 25.18 KG/M2 | WEIGHT: 170 LBS | HEIGHT: 69 IN

## 2025-01-17 DIAGNOSIS — C11.9 MALIGNANT NEOPLASM OF NASOPHARYNX, UNSPECIFIED: ICD-10-CM

## 2025-01-17 PROCEDURE — 99214 OFFICE O/P EST MOD 30 MIN: CPT

## 2025-01-21 ENCOUNTER — APPOINTMENT (OUTPATIENT)
Dept: OTOLARYNGOLOGY | Facility: CLINIC | Age: 60
End: 2025-01-21
Payer: MEDICAID

## 2025-01-21 PROCEDURE — 92526 ORAL FUNCTION THERAPY: CPT | Mod: GN

## 2025-01-27 ENCOUNTER — APPOINTMENT (OUTPATIENT)
Dept: OTOLARYNGOLOGY | Facility: CLINIC | Age: 60
End: 2025-01-27

## 2025-01-27 ENCOUNTER — APPOINTMENT (OUTPATIENT)
Dept: PHYSICAL MEDICINE AND REHAB | Facility: CLINIC | Age: 60
End: 2025-01-27
Payer: MEDICAID

## 2025-01-27 VITALS
DIASTOLIC BLOOD PRESSURE: 66 MMHG | SYSTOLIC BLOOD PRESSURE: 100 MMHG | BODY MASS INDEX: 25.18 KG/M2 | WEIGHT: 170 LBS | HEIGHT: 69 IN | HEART RATE: 96 BPM | RESPIRATION RATE: 14 BRPM

## 2025-01-27 DIAGNOSIS — I89.0 LYMPHEDEMA, NOT ELSEWHERE CLASSIFIED: ICD-10-CM

## 2025-01-27 DIAGNOSIS — R63.0 ANOREXIA: ICD-10-CM

## 2025-01-27 DIAGNOSIS — R13.10 DYSPHAGIA, UNSPECIFIED: ICD-10-CM

## 2025-01-27 PROCEDURE — 99214 OFFICE O/P EST MOD 30 MIN: CPT | Mod: 25

## 2025-01-27 PROCEDURE — 31231 NASAL ENDOSCOPY DX: CPT

## 2025-01-27 PROCEDURE — 99214 OFFICE O/P EST MOD 30 MIN: CPT

## 2025-01-28 ENCOUNTER — APPOINTMENT (OUTPATIENT)
Dept: OTOLARYNGOLOGY | Facility: CLINIC | Age: 60
End: 2025-01-28
Payer: MEDICAID

## 2025-01-28 PROCEDURE — 92526 ORAL FUNCTION THERAPY: CPT | Mod: GN

## 2025-01-31 ENCOUNTER — APPOINTMENT (OUTPATIENT)
Dept: RHEUMATOLOGY | Facility: CLINIC | Age: 60
End: 2025-01-31
Payer: MEDICAID

## 2025-01-31 ENCOUNTER — LABORATORY RESULT (OUTPATIENT)
Age: 60
End: 2025-01-31

## 2025-01-31 VITALS
WEIGHT: 170 LBS | HEIGHT: 69 IN | TEMPERATURE: 97.3 F | SYSTOLIC BLOOD PRESSURE: 107 MMHG | DIASTOLIC BLOOD PRESSURE: 72 MMHG | HEART RATE: 88 BPM | OXYGEN SATURATION: 99 % | BODY MASS INDEX: 25.18 KG/M2

## 2025-01-31 DIAGNOSIS — M79.641 PAIN IN RIGHT HAND: ICD-10-CM

## 2025-01-31 DIAGNOSIS — M79.2 NEURALGIA AND NEURITIS, UNSPECIFIED: ICD-10-CM

## 2025-01-31 DIAGNOSIS — G54.0 BRACHIAL PLEXUS DISORDERS: ICD-10-CM

## 2025-01-31 PROCEDURE — 36415 COLL VENOUS BLD VENIPUNCTURE: CPT

## 2025-01-31 PROCEDURE — 99204 OFFICE O/P NEW MOD 45 MIN: CPT

## 2025-02-03 LAB
ALBUMIN SERPL ELPH-MCNC: 3.8 G/DL
ALP BLD-CCNC: 108 U/L
ALT SERPL-CCNC: 14 U/L
ANION GAP SERPL CALC-SCNC: 10 MMOL/L
AST SERPL-CCNC: 17 U/L
BASOPHILS # BLD AUTO: 0 K/UL
BASOPHILS NFR BLD AUTO: 0 %
BILIRUB SERPL-MCNC: 0.2 MG/DL
BUN SERPL-MCNC: 19 MG/DL
CALCIUM SERPL-MCNC: 9.5 MG/DL
CCP AB SER IA-ACNC: 19.2 U/ML
CHLORIDE SERPL-SCNC: 96 MMOL/L
CO2 SERPL-SCNC: 33 MMOL/L
CREAT SERPL-MCNC: 0.82 MG/DL
CRP SERPL-MCNC: 7 MG/L
EGFR: 102 ML/MIN/1.73M2
EOSINOPHIL # BLD AUTO: 0.05 K/UL
EOSINOPHIL NFR BLD AUTO: 1.8 %
ERYTHROCYTE [SEDIMENTATION RATE] IN BLOOD BY WESTERGREN METHOD: 105 MM/HR
GLUCOSE SERPL-MCNC: 113 MG/DL
HCT VFR BLD CALC: 32.3 %
HGB BLD-MCNC: 10.2 G/DL
LYMPHOCYTES # BLD AUTO: 0.56 K/UL
LYMPHOCYTES NFR BLD AUTO: 19.1 %
MAN DIFF?: NORMAL
MCHC RBC-ENTMCNC: 31.6 G/DL
MCHC RBC-ENTMCNC: 31.6 PG
MCV RBC AUTO: 100 FL
MONOCYTES # BLD AUTO: 0.3 K/UL
MONOCYTES NFR BLD AUTO: 10.4 %
NEUTROPHILS # BLD AUTO: 2.01 K/UL
NEUTROPHILS NFR BLD AUTO: 68.7 %
PLATELET # BLD AUTO: 183 K/UL
POTASSIUM SERPL-SCNC: 4 MMOL/L
PROT SERPL-MCNC: 8.4 G/DL
RBC # BLD: 3.23 M/UL
RBC # FLD: 13.2 %
RF+CCP IGG SER-IMP: POSITIVE
RHEUMATOID FACT SER QL: 123 IU/ML
SODIUM SERPL-SCNC: 139 MMOL/L
WBC # FLD AUTO: 2.93 K/UL

## 2025-02-04 ENCOUNTER — APPOINTMENT (OUTPATIENT)
Dept: OTOLARYNGOLOGY | Facility: CLINIC | Age: 60
End: 2025-02-04

## 2025-02-08 ENCOUNTER — APPOINTMENT (OUTPATIENT)
Dept: CT IMAGING | Facility: CLINIC | Age: 60
End: 2025-02-08
Payer: MEDICAID

## 2025-02-08 PROCEDURE — 71260 CT THORAX DX C+: CPT

## 2025-02-08 PROCEDURE — 70491 CT SOFT TISSUE NECK W/DYE: CPT

## 2025-02-14 ENCOUNTER — APPOINTMENT (OUTPATIENT)
Dept: OTOLARYNGOLOGY | Facility: CLINIC | Age: 60
End: 2025-02-14
Payer: MEDICAID

## 2025-02-14 PROCEDURE — 92526 ORAL FUNCTION THERAPY: CPT | Mod: GN

## 2025-02-20 ENCOUNTER — APPOINTMENT (OUTPATIENT)
Dept: RHEUMATOLOGY | Facility: CLINIC | Age: 60
End: 2025-02-20
Payer: MEDICAID

## 2025-02-20 VITALS
DIASTOLIC BLOOD PRESSURE: 80 MMHG | HEIGHT: 69 IN | OXYGEN SATURATION: 98 % | SYSTOLIC BLOOD PRESSURE: 132 MMHG | BODY MASS INDEX: 27.11 KG/M2 | HEART RATE: 89 BPM | WEIGHT: 183 LBS

## 2025-02-20 DIAGNOSIS — Z79.899 OTHER LONG TERM (CURRENT) DRUG THERAPY: ICD-10-CM

## 2025-02-20 DIAGNOSIS — M06.9 RHEUMATOID ARTHRITIS, UNSPECIFIED: ICD-10-CM

## 2025-02-20 DIAGNOSIS — M25.50 PAIN IN UNSPECIFIED JOINT: ICD-10-CM

## 2025-02-20 PROCEDURE — 99214 OFFICE O/P EST MOD 30 MIN: CPT

## 2025-02-20 PROCEDURE — G2211 COMPLEX E/M VISIT ADD ON: CPT | Mod: NC

## 2025-02-20 RX ORDER — LEFLUNOMIDE 20 MG/1
20 TABLET, FILM COATED ORAL
Qty: 90 | Refills: 1 | Status: ACTIVE | COMMUNITY
Start: 2025-02-20 | End: 1900-01-01

## 2025-02-27 ENCOUNTER — APPOINTMENT (OUTPATIENT)
Dept: OTOLARYNGOLOGY | Facility: CLINIC | Age: 60
End: 2025-02-27
Payer: MEDICAID

## 2025-02-27 PROCEDURE — 92526 ORAL FUNCTION THERAPY: CPT | Mod: GN

## 2025-03-03 ENCOUNTER — APPOINTMENT (OUTPATIENT)
Dept: PHYSICAL MEDICINE AND REHAB | Facility: CLINIC | Age: 60
End: 2025-03-03
Payer: MEDICAID

## 2025-03-03 VITALS
BODY MASS INDEX: 27.11 KG/M2 | SYSTOLIC BLOOD PRESSURE: 125 MMHG | DIASTOLIC BLOOD PRESSURE: 81 MMHG | WEIGHT: 183 LBS | RESPIRATION RATE: 14 BRPM | HEIGHT: 69 IN | HEART RATE: 100 BPM

## 2025-03-03 DIAGNOSIS — K11.7 DISTURBANCES OF SALIVARY SECRETION: ICD-10-CM

## 2025-03-03 DIAGNOSIS — I89.0 LYMPHEDEMA, NOT ELSEWHERE CLASSIFIED: ICD-10-CM

## 2025-03-03 DIAGNOSIS — M79.2 NEURALGIA AND NEURITIS, UNSPECIFIED: ICD-10-CM

## 2025-03-03 PROCEDURE — 99214 OFFICE O/P EST MOD 30 MIN: CPT

## 2025-03-03 RX ORDER — AMOXICILLIN 500 MG/1
500 CAPSULE ORAL
Refills: 0 | Status: ACTIVE | COMMUNITY

## 2025-03-03 RX ORDER — CLARITHROMYCIN 250 MG/1
250 TABLET, FILM COATED ORAL
Refills: 0 | Status: ACTIVE | COMMUNITY

## 2025-03-13 ENCOUNTER — APPOINTMENT (OUTPATIENT)
Dept: OTOLARYNGOLOGY | Facility: CLINIC | Age: 60
End: 2025-03-13
Payer: MEDICAID

## 2025-03-13 PROCEDURE — 92526 ORAL FUNCTION THERAPY: CPT | Mod: GN

## 2025-03-27 ENCOUNTER — APPOINTMENT (OUTPATIENT)
Dept: OTOLARYNGOLOGY | Facility: CLINIC | Age: 60
End: 2025-03-27

## 2025-04-07 NOTE — ED PROVIDER NOTE - IV ALTEPLASE DOOR HIDDEN
Patient requested: Appt with PCP    Attempted to triage the patient's symptoms.  Patient/caller refused triage. Advised that without triage, the symptoms could be more serious than they appear, and gathering additional information about the symptoms will help to determine if they should be seen sooner, possibly in the ER or Urgent Care.     Patient/caller understands that if triage is refused, it could result in delayed care and serious health consequences.    Patient continued to refuse triage. RN followed standard workflows.        Reason for Disposition  • Caller has cancelled the call before the first contact    Protocols used: No Contact or Duplicate Contact Call-A-[place]     show

## 2025-04-08 ENCOUNTER — OUTPATIENT (OUTPATIENT)
Dept: OUTPATIENT SERVICES | Facility: HOSPITAL | Age: 60
LOS: 1 days | Discharge: HOME | End: 2025-04-08

## 2025-04-08 DIAGNOSIS — C11.9 MALIGNANT NEOPLASM OF NASOPHARYNX, UNSPECIFIED: ICD-10-CM

## 2025-04-10 ENCOUNTER — APPOINTMENT (OUTPATIENT)
Dept: OTOLARYNGOLOGY | Facility: CLINIC | Age: 60
End: 2025-04-10
Payer: MEDICAID

## 2025-04-10 PROCEDURE — 92526 ORAL FUNCTION THERAPY: CPT | Mod: GN

## 2025-04-15 ENCOUNTER — RESULT REVIEW (OUTPATIENT)
Age: 60
End: 2025-04-15

## 2025-04-15 ENCOUNTER — APPOINTMENT (OUTPATIENT)
Dept: HEMATOLOGY ONCOLOGY | Facility: CLINIC | Age: 60
End: 2025-04-15
Payer: MEDICAID

## 2025-04-15 ENCOUNTER — NON-APPOINTMENT (OUTPATIENT)
Age: 60
End: 2025-04-15

## 2025-04-15 ENCOUNTER — APPOINTMENT (OUTPATIENT)
Dept: RADIATION ONCOLOGY | Facility: CLINIC | Age: 60
End: 2025-04-15
Payer: MEDICAID

## 2025-04-15 VITALS
RESPIRATION RATE: 15 BRPM | SYSTOLIC BLOOD PRESSURE: 117 MMHG | OXYGEN SATURATION: 98 % | HEART RATE: 87 BPM | BODY MASS INDEX: 27.99 KG/M2 | HEIGHT: 69 IN | TEMPERATURE: 98 F | DIASTOLIC BLOOD PRESSURE: 76 MMHG | WEIGHT: 189 LBS

## 2025-04-15 DIAGNOSIS — G89.3 NEOPLASM RELATED PAIN (ACUTE) (CHRONIC): ICD-10-CM

## 2025-04-15 DIAGNOSIS — R42 DIZZINESS AND GIDDINESS: ICD-10-CM

## 2025-04-15 DIAGNOSIS — K11.7 DISTURBANCES OF SALIVARY SECRETION: ICD-10-CM

## 2025-04-15 DIAGNOSIS — C11.9 MALIGNANT NEOPLASM OF NASOPHARYNX, UNSPECIFIED: ICD-10-CM

## 2025-04-15 LAB
ALBUMIN SERPL ELPH-MCNC: 4.4 G/DL
ALP BLD-CCNC: 120 U/L
ALT SERPL-CCNC: 22 U/L
ANION GAP SERPL CALC-SCNC: 9 MMOL/L
ANISOCYTOSIS BLD QL: SLIGHT — SIGNIFICANT CHANGE UP
AST SERPL-CCNC: 21 U/L
BASOPHILS # BLD AUTO: 0 K/UL — SIGNIFICANT CHANGE UP (ref 0–0.2)
BASOPHILS NFR BLD AUTO: 0 % — SIGNIFICANT CHANGE UP (ref 0–2)
BILIRUB SERPL-MCNC: 0.2 MG/DL
BUN SERPL-MCNC: 20 MG/DL
CALCIUM SERPL-MCNC: 9.3 MG/DL
CHLORIDE SERPL-SCNC: 102 MMOL/L
CO2 SERPL-SCNC: 29 MMOL/L
CREAT SERPL-MCNC: 0.7 MG/DL
EGFRCR SERPLBLD CKD-EPI 2021: 107 ML/MIN/1.73M2
EOSINOPHIL # BLD AUTO: 0.06 K/UL — SIGNIFICANT CHANGE UP (ref 0–0.5)
EOSINOPHIL NFR BLD AUTO: 2 % — SIGNIFICANT CHANGE UP (ref 0–6)
GLUCOSE SERPL-MCNC: 96 MG/DL
HCT VFR BLD CALC: 35.7 % — LOW (ref 39–50)
HGB BLD-MCNC: 11.1 G/DL — LOW (ref 13–17)
IMM GRANULOCYTES # BLD AUTO: 0.01 K/UL — SIGNIFICANT CHANGE UP (ref 0–0.07)
IMM GRANULOCYTES NFR BLD AUTO: 0.3 % — SIGNIFICANT CHANGE UP (ref 0–0.9)
LG PLATELETS BLD QL AUTO: SLIGHT — SIGNIFICANT CHANGE UP
LYMPHOCYTES # BLD AUTO: 0.52 K/UL — LOW (ref 1–3.3)
LYMPHOCYTES NFR BLD AUTO: 17.5 % — SIGNIFICANT CHANGE UP (ref 13–44)
MAGNESIUM SERPL-MCNC: 2 MG/DL
MANUAL SMEAR VERIFICATION: SIGNIFICANT CHANGE UP
MCHC RBC-ENTMCNC: 30.3 PG — SIGNIFICANT CHANGE UP (ref 27–34)
MCHC RBC-ENTMCNC: 31.1 G/DL — LOW (ref 32–36)
MCV RBC AUTO: 97.5 FL — SIGNIFICANT CHANGE UP (ref 80–100)
MONOCYTES # BLD AUTO: 0.37 K/UL — SIGNIFICANT CHANGE UP (ref 0–0.9)
MONOCYTES NFR BLD AUTO: 12.5 % — SIGNIFICANT CHANGE UP (ref 2–14)
NEUTROPHILS # BLD AUTO: 2.01 K/UL — SIGNIFICANT CHANGE UP (ref 1.8–7.4)
NEUTROPHILS NFR BLD AUTO: 67.7 % — SIGNIFICANT CHANGE UP (ref 43–77)
NRBC # BLD AUTO: 0 K/UL — SIGNIFICANT CHANGE UP (ref 0–0)
NRBC # FLD: 0 K/UL — SIGNIFICANT CHANGE UP (ref 0–0)
NRBC BLD AUTO-RTO: 0 /100 WBCS — SIGNIFICANT CHANGE UP (ref 0–0)
PLAT MORPH BLD: NORMAL — SIGNIFICANT CHANGE UP
PLATELET # BLD AUTO: 119 K/UL — LOW (ref 150–400)
PMV BLD: 9.8 FL — SIGNIFICANT CHANGE UP (ref 7–13)
POTASSIUM SERPL-SCNC: 4.3 MMOL/L
PROT SERPL-MCNC: 8.7 G/DL
RBC # BLD: 3.66 M/UL — LOW (ref 4.2–5.8)
RBC # FLD: 14.5 % — SIGNIFICANT CHANGE UP (ref 10.3–14.5)
RBC BLD AUTO: SIGNIFICANT CHANGE UP
SCHISTOCYTES BLD QL AUTO: SLIGHT — SIGNIFICANT CHANGE UP
SODIUM SERPL-SCNC: 139 MMOL/L
WBC # BLD: 2.97 K/UL — LOW (ref 3.8–10.5)
WBC # FLD AUTO: 2.97 K/UL — LOW (ref 3.8–10.5)

## 2025-04-15 PROCEDURE — 99214 OFFICE O/P EST MOD 30 MIN: CPT

## 2025-04-15 PROCEDURE — G2211 COMPLEX E/M VISIT ADD ON: CPT | Mod: NC

## 2025-04-15 PROCEDURE — 99215 OFFICE O/P EST HI 40 MIN: CPT

## 2025-04-17 ENCOUNTER — APPOINTMENT (OUTPATIENT)
Dept: OTOLARYNGOLOGY | Facility: CLINIC | Age: 60
End: 2025-04-17

## 2025-04-21 ENCOUNTER — APPOINTMENT (OUTPATIENT)
Dept: RHEUMATOLOGY | Facility: CLINIC | Age: 60
End: 2025-04-21
Payer: MEDICAID

## 2025-04-21 VITALS — SYSTOLIC BLOOD PRESSURE: 132 MMHG | DIASTOLIC BLOOD PRESSURE: 75 MMHG | OXYGEN SATURATION: 98 % | HEART RATE: 97 BPM

## 2025-04-21 DIAGNOSIS — M25.50 PAIN IN UNSPECIFIED JOINT: ICD-10-CM

## 2025-04-21 DIAGNOSIS — M06.9 RHEUMATOID ARTHRITIS, UNSPECIFIED: ICD-10-CM

## 2025-04-21 DIAGNOSIS — Z79.899 OTHER LONG TERM (CURRENT) DRUG THERAPY: ICD-10-CM

## 2025-04-21 PROCEDURE — 99214 OFFICE O/P EST MOD 30 MIN: CPT

## 2025-04-21 PROCEDURE — G2211 COMPLEX E/M VISIT ADD ON: CPT | Mod: NC

## 2025-04-23 RX ORDER — TOCILIZUMAB 180 MG/ML
162 INJECTION, SOLUTION SUBCUTANEOUS
Qty: 2 | Refills: 5 | Status: ACTIVE | COMMUNITY
Start: 2025-04-21

## 2025-04-28 ENCOUNTER — APPOINTMENT (OUTPATIENT)
Dept: PHYSICAL MEDICINE AND REHAB | Facility: CLINIC | Age: 60
End: 2025-04-28
Payer: MEDICAID

## 2025-04-28 ENCOUNTER — APPOINTMENT (OUTPATIENT)
Dept: OTOLARYNGOLOGY | Facility: CLINIC | Age: 60
End: 2025-04-28
Payer: MEDICAID

## 2025-04-28 VITALS
RESPIRATION RATE: 14 BRPM | DIASTOLIC BLOOD PRESSURE: 80 MMHG | HEART RATE: 80 BPM | WEIGHT: 189 LBS | HEIGHT: 69 IN | BODY MASS INDEX: 27.99 KG/M2 | SYSTOLIC BLOOD PRESSURE: 130 MMHG

## 2025-04-28 DIAGNOSIS — I89.0 LYMPHEDEMA, NOT ELSEWHERE CLASSIFIED: ICD-10-CM

## 2025-04-28 DIAGNOSIS — M79.2 NEURALGIA AND NEURITIS, UNSPECIFIED: ICD-10-CM

## 2025-04-28 DIAGNOSIS — C11.9 MALIGNANT NEOPLASM OF NASOPHARYNX, UNSPECIFIED: ICD-10-CM

## 2025-04-28 DIAGNOSIS — R13.10 DYSPHAGIA, UNSPECIFIED: ICD-10-CM

## 2025-04-28 PROCEDURE — 99214 OFFICE O/P EST MOD 30 MIN: CPT

## 2025-04-28 PROCEDURE — 31231 NASAL ENDOSCOPY DX: CPT

## 2025-04-28 PROCEDURE — 99214 OFFICE O/P EST MOD 30 MIN: CPT | Mod: 25

## 2025-04-28 RX ORDER — GABAPENTIN 300 MG/1
300 CAPSULE ORAL
Qty: 60 | Refills: 2 | Status: ACTIVE | COMMUNITY
Start: 2025-04-28 | End: 1900-01-01

## 2025-04-30 ENCOUNTER — OUTPATIENT (OUTPATIENT)
Dept: OUTPATIENT SERVICES | Facility: HOSPITAL | Age: 60
LOS: 1 days | End: 2025-04-30

## 2025-04-30 ENCOUNTER — APPOINTMENT (OUTPATIENT)
Dept: INTERNAL MEDICINE | Facility: CLINIC | Age: 60
End: 2025-04-30

## 2025-04-30 ENCOUNTER — NON-APPOINTMENT (OUTPATIENT)
Age: 60
End: 2025-04-30

## 2025-05-05 ENCOUNTER — APPOINTMENT (OUTPATIENT)
Dept: RHEUMATOLOGY | Facility: CLINIC | Age: 60
End: 2025-05-05

## 2025-05-05 VITALS
OXYGEN SATURATION: 96 % | RESPIRATION RATE: 15 BRPM | SYSTOLIC BLOOD PRESSURE: 132 MMHG | HEART RATE: 75 BPM | DIASTOLIC BLOOD PRESSURE: 84 MMHG | TEMPERATURE: 97.5 F

## 2025-06-30 ENCOUNTER — APPOINTMENT (OUTPATIENT)
Dept: PHYSICAL MEDICINE AND REHAB | Facility: CLINIC | Age: 60
End: 2025-06-30
Payer: MEDICAID

## 2025-06-30 VITALS
SYSTOLIC BLOOD PRESSURE: 108 MMHG | OXYGEN SATURATION: 97 % | WEIGHT: 194.44 LBS | BODY MASS INDEX: 28.8 KG/M2 | HEART RATE: 102 BPM | RESPIRATION RATE: 16 BRPM | DIASTOLIC BLOOD PRESSURE: 74 MMHG | HEIGHT: 69 IN

## 2025-06-30 PROCEDURE — 99214 OFFICE O/P EST MOD 30 MIN: CPT

## 2025-07-21 ENCOUNTER — APPOINTMENT (OUTPATIENT)
Dept: RHEUMATOLOGY | Facility: CLINIC | Age: 60
End: 2025-07-21
Payer: MEDICAID

## 2025-07-21 DIAGNOSIS — M06.9 RHEUMATOID ARTHRITIS, UNSPECIFIED: ICD-10-CM

## 2025-07-21 DIAGNOSIS — M25.50 PAIN IN UNSPECIFIED JOINT: ICD-10-CM

## 2025-07-21 DIAGNOSIS — Z79.899 OTHER LONG TERM (CURRENT) DRUG THERAPY: ICD-10-CM

## 2025-07-21 PROCEDURE — 99214 OFFICE O/P EST MOD 30 MIN: CPT

## 2025-07-21 PROCEDURE — 36415 COLL VENOUS BLD VENIPUNCTURE: CPT

## 2025-07-21 PROCEDURE — G2211 COMPLEX E/M VISIT ADD ON: CPT | Mod: NC

## 2025-07-21 RX ORDER — METHYLPREDNISOLONE 4 MG/1
4 TABLET ORAL
Qty: 1 | Refills: 3 | Status: ACTIVE | COMMUNITY
Start: 2025-07-21 | End: 1900-01-01

## 2025-07-22 LAB
ALBUMIN SERPL ELPH-MCNC: 4.4 G/DL
ALP BLD-CCNC: 111 U/L
ALT SERPL-CCNC: 34 U/L
ANION GAP SERPL CALC-SCNC: 11 MMOL/L
AST SERPL-CCNC: 27 U/L
BASOPHILS # BLD AUTO: 0.01 K/UL
BASOPHILS NFR BLD AUTO: 0.3 %
BILIRUB SERPL-MCNC: 0.3 MG/DL
BUN SERPL-MCNC: 28 MG/DL
CALCIUM SERPL-MCNC: 10 MG/DL
CHLORIDE SERPL-SCNC: 100 MMOL/L
CO2 SERPL-SCNC: 29 MMOL/L
CREAT SERPL-MCNC: 0.87 MG/DL
CRP SERPL-MCNC: <3 MG/L
EGFRCR SERPLBLD CKD-EPI 2021: 100 ML/MIN/1.73M2
EOSINOPHIL # BLD AUTO: 0.09 K/UL
EOSINOPHIL NFR BLD AUTO: 2.4 %
ERYTHROCYTE [SEDIMENTATION RATE] IN BLOOD BY WESTERGREN METHOD: 27 MM/HR
GLUCOSE SERPL-MCNC: 84 MG/DL
HCT VFR BLD CALC: 35.8 %
HGB BLD-MCNC: 11.7 G/DL
IMM GRANULOCYTES NFR BLD AUTO: 0 %
LYMPHOCYTES # BLD AUTO: 0.73 K/UL
LYMPHOCYTES NFR BLD AUTO: 19.6 %
MAN DIFF?: NORMAL
MCHC RBC-ENTMCNC: 32.4 PG
MCHC RBC-ENTMCNC: 32.7 G/DL
MCV RBC AUTO: 99.2 FL
MONOCYTES # BLD AUTO: 0.4 K/UL
MONOCYTES NFR BLD AUTO: 10.7 %
NEUTROPHILS # BLD AUTO: 2.5 K/UL
NEUTROPHILS NFR BLD AUTO: 67 %
PLATELET # BLD AUTO: 143 K/UL
POTASSIUM SERPL-SCNC: 4.7 MMOL/L
PROT SERPL-MCNC: 8.2 G/DL
RBC # BLD: 3.61 M/UL
RBC # FLD: 15.2 %
SODIUM SERPL-SCNC: 140 MMOL/L
WBC # FLD AUTO: 3.73 K/UL

## 2025-07-23 ENCOUNTER — OUTPATIENT (OUTPATIENT)
Dept: OUTPATIENT SERVICES | Facility: HOSPITAL | Age: 60
LOS: 1 days | Discharge: HOME | End: 2025-07-23

## 2025-07-23 DIAGNOSIS — C11.9 MALIGNANT NEOPLASM OF NASOPHARYNX, UNSPECIFIED: ICD-10-CM

## 2025-07-29 ENCOUNTER — APPOINTMENT (OUTPATIENT)
Dept: HEMATOLOGY ONCOLOGY | Facility: CLINIC | Age: 60
End: 2025-07-29
Payer: MEDICAID

## 2025-07-29 ENCOUNTER — APPOINTMENT (OUTPATIENT)
Dept: RADIATION ONCOLOGY | Facility: CLINIC | Age: 60
End: 2025-07-29
Payer: MEDICAID

## 2025-07-29 VITALS
TEMPERATURE: 97.6 F | WEIGHT: 210 LBS | BODY MASS INDEX: 31.1 KG/M2 | DIASTOLIC BLOOD PRESSURE: 78 MMHG | OXYGEN SATURATION: 95 % | HEART RATE: 94 BPM | HEIGHT: 69 IN | SYSTOLIC BLOOD PRESSURE: 126 MMHG

## 2025-07-29 VITALS
OXYGEN SATURATION: 96 % | DIASTOLIC BLOOD PRESSURE: 80 MMHG | SYSTOLIC BLOOD PRESSURE: 117 MMHG | RESPIRATION RATE: 16 BRPM | HEART RATE: 90 BPM | BODY MASS INDEX: 31.1 KG/M2 | HEIGHT: 69 IN | WEIGHT: 210 LBS

## 2025-07-29 DIAGNOSIS — C11.9 MALIGNANT NEOPLASM OF NASOPHARYNX, UNSPECIFIED: ICD-10-CM

## 2025-07-29 PROCEDURE — 99214 OFFICE O/P EST MOD 30 MIN: CPT

## 2025-07-29 PROCEDURE — G2211 COMPLEX E/M VISIT ADD ON: CPT | Mod: NC

## 2025-08-05 ENCOUNTER — APPOINTMENT (OUTPATIENT)
Dept: ULTRASOUND IMAGING | Facility: CLINIC | Age: 60
End: 2025-08-05
Payer: MEDICAID

## 2025-08-05 PROCEDURE — 76881 US COMPL JOINT R-T W/IMG: CPT

## 2025-08-25 ENCOUNTER — APPOINTMENT (OUTPATIENT)
Dept: OTOLARYNGOLOGY | Facility: CLINIC | Age: 60
End: 2025-08-25
Payer: MEDICAID

## 2025-08-25 VITALS
DIASTOLIC BLOOD PRESSURE: 93 MMHG | OXYGEN SATURATION: 96 % | HEIGHT: 69 IN | WEIGHT: 170 LBS | SYSTOLIC BLOOD PRESSURE: 134 MMHG | RESPIRATION RATE: 18 BRPM | BODY MASS INDEX: 25.18 KG/M2 | HEART RATE: 97 BPM

## 2025-08-25 DIAGNOSIS — I89.0 LYMPHEDEMA, NOT ELSEWHERE CLASSIFIED: ICD-10-CM

## 2025-08-25 DIAGNOSIS — C11.9 MALIGNANT NEOPLASM OF NASOPHARYNX, UNSPECIFIED: ICD-10-CM

## 2025-08-25 DIAGNOSIS — R13.10 DYSPHAGIA, UNSPECIFIED: ICD-10-CM

## 2025-08-25 PROCEDURE — 99214 OFFICE O/P EST MOD 30 MIN: CPT | Mod: 25

## 2025-08-25 PROCEDURE — 31231 NASAL ENDOSCOPY DX: CPT

## 2025-09-24 LAB
ALBUMIN SERPL ELPH-MCNC: 4.6 G/DL
ALP BLD-CCNC: 74 U/L
ALT SERPL-CCNC: 90 U/L
ANION GAP SERPL CALC-SCNC: 13 MMOL/L
AST SERPL-CCNC: 43 U/L
BILIRUB SERPL-MCNC: 0.4 MG/DL
BUN SERPL-MCNC: 21 MG/DL
CALCIUM SERPL-MCNC: 9.5 MG/DL
CHLORIDE SERPL-SCNC: 100 MMOL/L
CO2 SERPL-SCNC: 25 MMOL/L
CREAT SERPL-MCNC: 1.16 MG/DL
EGFRCR SERPLBLD CKD-EPI 2021: 73 ML/MIN/1.73M2
GLUCOSE SERPL-MCNC: 87 MG/DL
MAGNESIUM SERPL-MCNC: 1.7 MG/DL
POTASSIUM SERPL-SCNC: 4.1 MMOL/L
PROT SERPL-MCNC: 7.8 G/DL
SODIUM SERPL-SCNC: 138 MMOL/L